# Patient Record
Sex: FEMALE | Race: BLACK OR AFRICAN AMERICAN | Employment: OTHER | URBAN - METROPOLITAN AREA
[De-identification: names, ages, dates, MRNs, and addresses within clinical notes are randomized per-mention and may not be internally consistent; named-entity substitution may affect disease eponyms.]

---

## 2017-01-06 ENCOUNTER — ANESTHESIA EVENT (OUTPATIENT)
Dept: SURGERY | Age: 72
End: 2017-01-06

## 2017-01-09 ENCOUNTER — ANESTHESIA (OUTPATIENT)
Dept: SURGERY | Age: 72
End: 2017-01-09

## 2017-01-09 ENCOUNTER — SURGERY (OUTPATIENT)
Age: 72
End: 2017-01-09

## 2017-01-09 ENCOUNTER — APPOINTMENT (OUTPATIENT)
Dept: GENERAL RADIOLOGY | Age: 72
End: 2017-01-09
Attending: UROLOGY
Payer: MEDICARE

## 2017-01-19 RX ORDER — POTASSIUM CHLORIDE 40 MEQ/15ML
60 SOLUTION ORAL DAILY
COMMUNITY
End: 2017-06-23 | Stop reason: SDUPTHER

## 2017-01-20 ENCOUNTER — ANESTHESIA EVENT (OUTPATIENT)
Dept: SURGERY | Age: 72
End: 2017-01-20
Payer: MEDICARE

## 2017-01-23 ENCOUNTER — ANESTHESIA (OUTPATIENT)
Dept: SURGERY | Age: 72
End: 2017-01-23
Payer: MEDICARE

## 2017-01-23 ENCOUNTER — APPOINTMENT (OUTPATIENT)
Dept: GENERAL RADIOLOGY | Age: 72
End: 2017-01-23
Attending: UROLOGY
Payer: MEDICARE

## 2017-01-23 ENCOUNTER — HOSPITAL ENCOUNTER (OUTPATIENT)
Age: 72
Setting detail: OUTPATIENT SURGERY
Discharge: HOME | End: 2017-01-23
Attending: UROLOGY | Admitting: UROLOGY
Payer: MEDICARE

## 2017-01-23 VITALS
SYSTOLIC BLOOD PRESSURE: 127 MMHG | WEIGHT: 124 LBS | DIASTOLIC BLOOD PRESSURE: 85 MMHG | BODY MASS INDEX: 23.41 KG/M2 | HEART RATE: 100 BPM | HEIGHT: 61 IN | TEMPERATURE: 97.6 F | RESPIRATION RATE: 18 BRPM | OXYGEN SATURATION: 95 %

## 2017-01-23 DIAGNOSIS — N32.89 BLADDER MASS: Primary | ICD-10-CM

## 2017-01-23 LAB
BUN BLD-MCNC: 23 MG/DL (ref 7–18)
CHLORIDE BLD-SCNC: 100 MMOL/L (ref 100–108)
GLUCOSE BLD STRIP.AUTO-MCNC: 129 MG/DL (ref 74–106)
HCT VFR BLD CALC: 44 % (ref 36–49)
HGB BLD-MCNC: 15 G/DL (ref 12–16)
POTASSIUM BLD-SCNC: 3.7 MMOL/L (ref 3.5–5.5)
SODIUM BLD-SCNC: 140 MMOL/L (ref 136–145)

## 2017-01-23 PROCEDURE — 74011250636 HC RX REV CODE- 250/636

## 2017-01-23 PROCEDURE — 77030013079 HC BLNKT BAIR HGGR 3M -A: Performed by: ANESTHESIOLOGY

## 2017-01-23 PROCEDURE — 77030018846 HC SOL IRR STRL H20 ICUM -A: Performed by: UROLOGY

## 2017-01-23 PROCEDURE — 74011000250 HC RX REV CODE- 250

## 2017-01-23 PROCEDURE — 77030011640 HC PAD GRND REM COVD -A: Performed by: UROLOGY

## 2017-01-23 PROCEDURE — C1758 CATHETER, URETERAL: HCPCS | Performed by: UROLOGY

## 2017-01-23 PROCEDURE — 74011000272 HC RX REV CODE- 272: Performed by: UROLOGY

## 2017-01-23 PROCEDURE — 76010000149 HC OR TIME 1 TO 1.5 HR: Performed by: UROLOGY

## 2017-01-23 PROCEDURE — 77030028098 HC ELECTRD LP BPLR OCOA -F: Performed by: UROLOGY

## 2017-01-23 PROCEDURE — 77030005520 HC CATH URETH FOL38 BARD -A: Performed by: UROLOGY

## 2017-01-23 PROCEDURE — 77030008477 HC STYL SATN SLP COVD -A: Performed by: ANESTHESIOLOGY

## 2017-01-23 PROCEDURE — 76210000026 HC REC RM PH II 1 TO 1.5 HR: Performed by: UROLOGY

## 2017-01-23 PROCEDURE — 76060000033 HC ANESTHESIA 1 TO 1.5 HR: Performed by: UROLOGY

## 2017-01-23 PROCEDURE — 82947 ASSAY GLUCOSE BLOOD QUANT: CPT

## 2017-01-23 PROCEDURE — 77030012961 HC IRR KT CYSTO/TUR ICUM -A: Performed by: UROLOGY

## 2017-01-23 PROCEDURE — 76210000016 HC OR PH I REC 1 TO 1.5 HR: Performed by: UROLOGY

## 2017-01-23 PROCEDURE — 74011250637 HC RX REV CODE- 250/637: Performed by: NURSE ANESTHETIST, CERTIFIED REGISTERED

## 2017-01-23 PROCEDURE — 77030008683 HC TU ET CUF COVD -A: Performed by: ANESTHESIOLOGY

## 2017-01-23 PROCEDURE — 74011250636 HC RX REV CODE- 250/636: Performed by: NURSE ANESTHETIST, CERTIFIED REGISTERED

## 2017-01-23 PROCEDURE — 77030032490 HC SLV COMPR SCD KNE COVD -B: Performed by: UROLOGY

## 2017-01-23 PROCEDURE — 74011250636 HC RX REV CODE- 250/636: Performed by: UROLOGY

## 2017-01-23 PROCEDURE — 77030010545: Performed by: UROLOGY

## 2017-01-23 PROCEDURE — 88307 TISSUE EXAM BY PATHOLOGIST: CPT | Performed by: UROLOGY

## 2017-01-23 PROCEDURE — 74011636320 HC RX REV CODE- 636/320: Performed by: UROLOGY

## 2017-01-23 PROCEDURE — 77030011267 HC ELECTRD BLD COVD -A: Performed by: UROLOGY

## 2017-01-23 PROCEDURE — 77030010547 HC BG URIN W/UMETER -A: Performed by: UROLOGY

## 2017-01-23 PROCEDURE — 77030012884 HC SLV COMPR SCD MTEK -B: Performed by: UROLOGY

## 2017-01-23 PROCEDURE — 77030021163 HC TUBE CYSTO IRR ICUM -A: Performed by: UROLOGY

## 2017-01-23 PROCEDURE — 77030018831 HC SOL IRR H20 BAXT -A: Performed by: UROLOGY

## 2017-01-23 RX ORDER — FENTANYL CITRATE 50 UG/ML
25 INJECTION, SOLUTION INTRAMUSCULAR; INTRAVENOUS AS NEEDED
Status: DISCONTINUED | OUTPATIENT
Start: 2017-01-23 | End: 2017-01-23 | Stop reason: HOSPADM

## 2017-01-23 RX ORDER — FAMOTIDINE 20 MG/1
20 TABLET, FILM COATED ORAL ONCE
Status: COMPLETED | OUTPATIENT
Start: 2017-01-24 | End: 2017-01-23

## 2017-01-23 RX ORDER — ONDANSETRON 2 MG/ML
4 INJECTION INTRAMUSCULAR; INTRAVENOUS ONCE
Status: DISCONTINUED | OUTPATIENT
Start: 2017-01-23 | End: 2017-01-23 | Stop reason: HOSPADM

## 2017-01-23 RX ORDER — SODIUM CHLORIDE, SODIUM LACTATE, POTASSIUM CHLORIDE, CALCIUM CHLORIDE 600; 310; 30; 20 MG/100ML; MG/100ML; MG/100ML; MG/100ML
25 INJECTION, SOLUTION INTRAVENOUS CONTINUOUS
Status: DISCONTINUED | OUTPATIENT
Start: 2017-01-23 | End: 2017-01-23 | Stop reason: HOSPADM

## 2017-01-23 RX ORDER — DOCUSATE SODIUM 100 MG/1
100 CAPSULE, LIQUID FILLED ORAL 2 TIMES DAILY
Qty: 30 CAP | Refills: 0 | Status: SHIPPED | OUTPATIENT
Start: 2017-01-23 | End: 2017-04-23

## 2017-01-23 RX ORDER — ONDANSETRON 2 MG/ML
INJECTION INTRAMUSCULAR; INTRAVENOUS AS NEEDED
Status: DISCONTINUED | OUTPATIENT
Start: 2017-01-23 | End: 2017-01-23 | Stop reason: HOSPADM

## 2017-01-23 RX ORDER — FAMOTIDINE 20 MG/1
TABLET, FILM COATED ORAL
Status: DISCONTINUED
Start: 2017-01-23 | End: 2017-01-23 | Stop reason: HOSPADM

## 2017-01-23 RX ORDER — SODIUM CHLORIDE, SODIUM LACTATE, POTASSIUM CHLORIDE, CALCIUM CHLORIDE 600; 310; 30; 20 MG/100ML; MG/100ML; MG/100ML; MG/100ML
50 INJECTION, SOLUTION INTRAVENOUS CONTINUOUS
Status: DISCONTINUED | OUTPATIENT
Start: 2017-01-24 | End: 2017-01-23 | Stop reason: HOSPADM

## 2017-01-23 RX ORDER — CEFAZOLIN SODIUM 2 G/50ML
2 SOLUTION INTRAVENOUS
Status: COMPLETED | OUTPATIENT
Start: 2017-01-23 | End: 2017-01-23

## 2017-01-23 RX ORDER — OXYCODONE AND ACETAMINOPHEN 5; 325 MG/1; MG/1
1 TABLET ORAL
Qty: 10 TAB | Refills: 0 | Status: SHIPPED | OUTPATIENT
Start: 2017-01-23 | End: 2017-06-23

## 2017-01-23 RX ORDER — GENTAMICIN SULFATE 40 MG/ML
INJECTION, SOLUTION INTRAMUSCULAR; INTRAVENOUS AS NEEDED
Status: DISCONTINUED | OUTPATIENT
Start: 2017-01-23 | End: 2017-01-23 | Stop reason: HOSPADM

## 2017-01-23 RX ORDER — FENTANYL CITRATE 50 UG/ML
INJECTION, SOLUTION INTRAMUSCULAR; INTRAVENOUS AS NEEDED
Status: DISCONTINUED | OUTPATIENT
Start: 2017-01-23 | End: 2017-01-23 | Stop reason: HOSPADM

## 2017-01-23 RX ORDER — PROPOFOL 10 MG/ML
INJECTION, EMULSION INTRAVENOUS AS NEEDED
Status: DISCONTINUED | OUTPATIENT
Start: 2017-01-23 | End: 2017-01-23 | Stop reason: HOSPADM

## 2017-01-23 RX ORDER — CEFAZOLIN SODIUM 2 G/50ML
SOLUTION INTRAVENOUS
Status: DISCONTINUED
Start: 2017-01-23 | End: 2017-01-23 | Stop reason: HOSPADM

## 2017-01-23 RX ORDER — PHENAZOPYRIDINE HYDROCHLORIDE 200 MG/1
200 TABLET, FILM COATED ORAL
Qty: 6 TAB | Refills: 0 | Status: SHIPPED | OUTPATIENT
Start: 2017-01-23 | End: 2017-01-25

## 2017-01-23 RX ORDER — LIDOCAINE HYDROCHLORIDE 20 MG/ML
INJECTION, SOLUTION EPIDURAL; INFILTRATION; INTRACAUDAL; PERINEURAL AS NEEDED
Status: DISCONTINUED | OUTPATIENT
Start: 2017-01-23 | End: 2017-01-23 | Stop reason: HOSPADM

## 2017-01-23 RX ORDER — DEXAMETHASONE SODIUM PHOSPHATE 4 MG/ML
INJECTION, SOLUTION INTRA-ARTICULAR; INTRALESIONAL; INTRAMUSCULAR; INTRAVENOUS; SOFT TISSUE AS NEEDED
Status: DISCONTINUED | OUTPATIENT
Start: 2017-01-23 | End: 2017-01-23 | Stop reason: HOSPADM

## 2017-01-23 RX ORDER — ROCURONIUM BROMIDE 10 MG/ML
INJECTION, SOLUTION INTRAVENOUS AS NEEDED
Status: DISCONTINUED | OUTPATIENT
Start: 2017-01-23 | End: 2017-01-23 | Stop reason: HOSPADM

## 2017-01-23 RX ADMIN — FAMOTIDINE 20 MG: 20 TABLET ORAL at 06:34

## 2017-01-23 RX ADMIN — FENTANYL CITRATE 50 MCG: 50 INJECTION, SOLUTION INTRAMUSCULAR; INTRAVENOUS at 07:55

## 2017-01-23 RX ADMIN — ONDANSETRON 4 MG: 2 INJECTION INTRAMUSCULAR; INTRAVENOUS at 08:26

## 2017-01-23 RX ADMIN — LIDOCAINE HYDROCHLORIDE 50 MG: 20 INJECTION, SOLUTION EPIDURAL; INFILTRATION; INTRACAUDAL; PERINEURAL at 07:55

## 2017-01-23 RX ADMIN — ROCURONIUM BROMIDE 25 MG: 10 INJECTION, SOLUTION INTRAVENOUS at 08:00

## 2017-01-23 RX ADMIN — PROPOFOL 120 MG: 10 INJECTION, EMULSION INTRAVENOUS at 07:55

## 2017-01-23 RX ADMIN — CEFAZOLIN SODIUM 2 G: 2 SOLUTION INTRAVENOUS at 07:55

## 2017-01-23 RX ADMIN — PROPOFOL 30 MG: 10 INJECTION, EMULSION INTRAVENOUS at 08:08

## 2017-01-23 RX ADMIN — ROCURONIUM BROMIDE 5 MG: 10 INJECTION, SOLUTION INTRAVENOUS at 07:55

## 2017-01-23 RX ADMIN — SODIUM CHLORIDE, SODIUM LACTATE, POTASSIUM CHLORIDE, AND CALCIUM CHLORIDE 50 ML/HR: 600; 310; 30; 20 INJECTION, SOLUTION INTRAVENOUS at 06:45

## 2017-01-23 RX ADMIN — LIDOCAINE HYDROCHLORIDE 50 MG: 20 INJECTION, SOLUTION EPIDURAL; INFILTRATION; INTRACAUDAL; PERINEURAL at 08:08

## 2017-01-23 RX ADMIN — DEXAMETHASONE SODIUM PHOSPHATE 4 MG: 4 INJECTION, SOLUTION INTRA-ARTICULAR; INTRALESIONAL; INTRAMUSCULAR; INTRAVENOUS; SOFT TISSUE at 08:21

## 2017-01-23 NOTE — OP NOTES
Martinez Sanchez    Name:  Bart Barajas  MR#:  [id number]  :  1945  Account #:  [de-identified]  Date of Adm:  2017  Date of Surgery:  2017      PREOPERATIVE DIAGNOSIS: Bladder tumor. POSTOPERATIVE DIAGNOSIS: Bladder tumor. PROCEDURES PERFORMED  1. Cystoscopy. 2. Transurethral resection of bladder tumor, small. 3. Bilateral retrograde pyelogram with intraoperative interpretation. 4. Mitomycin-C instillation. SURGEON: Demarco Becerra MD    ASSISTANT: Lorraine Sorensen MD    ANESTHESIA: General.    FLUIDS: Crystalloid. ESTIMATED BLOOD LOSS: Minimal.    SPECIMENS REMOVED: Bladder tumor chips. DRAINS: A 16-Greek Howe catheter. INTRAOPERATIVE FINDINGS  1. On exam under anesthesia, the patient's bladder was freely mobile. There were no 3-D masses noted. 2. On right retrograde pyelogram with intraoperative interpretation, the  patient had no hydronephrosis or filling defects. 3. On left retrograde pyelogram with intraoperative interpretation, the  patient had no hydronephrosis or filling defects. 4. On cystourethroscopy, the patient had an approximately 1 cm tumor  on the left lateral wall. This was completely resected down to muscle. No other lesions were noted. INDICATIONS: The patient is an extremely pleasant 66-year-old  female who was noted on CT scan to have a bladder mass. She is  currently undergoing therapy for lung cancer. Cystoscopy in the office  revealed bladder tumor. All risks, benefits, and alternatives were  explained, and the patient decided to proceed. DESCRIPTION OF PROCEDURE: The patient was first identified in  the holding area and taken back to the operating room. Perioperative  antibiotics were given and sequential compression devices placed. Anesthesia was induced. The patient was then placed in dorsal  lithotomy position, taking care to pad all pressure points.  The patient  was prepped and draped in the usual sterile fashion. Time-out was  performed. First, a 22-Rwandan rigid cystoscope was inserted into the patient's  bladder. Systematic cystoscopy with 30 and 70-degree lenses was  performed with findings as noted above. Once this was done, an 8-  [place] cone tip catheter was used to perform a left retrograde  pyelogram, noting no hydronephrosis or filling defects. We then  performed a right retrograde pyelogram with intraoperative  interpretation using the 8-Rwandan cone tip catheter with no  hydronephrosis or filling defects. Both collecting systems drained well. Once this was done, we replaced the cystoscope with a 25-Rwandan  continuous flow resectoscope. The bladder tumor was completely  resected with a super loop. Tips were collected. The bladder was filled  and emptied multiple times. Hemostasis was assured. The scope was  removed. A 16-Rwandan Howe catheter placed. Mitomycin instilled. The  patient was awakened from anesthesia and taken back to the PACU in  stable condition. Of note, I was scrubbed and present for all critical  portions of the case.         MD ELSIE Monsivais / Bill Calderon  D:  01/23/2017   08:37  T:  01/23/2017   09:14  Job #:  [id number]

## 2017-01-23 NOTE — PROGRESS NOTES
conducted a pre-surgery visit with Ammon Bryant, who is a 66 y.o.,female. The  provided the following Interventions:  Initiated a relationship of care and support. Offered prayer and assurance of continued prayers on patient's behalf. Plan:  Chaplains will continue to follow and will provide pastoral care on an as needed/requested basis.  recommends bedside caregivers page  on duty if patient shows signs of acute spiritual or emotional distress.     Rivka Mejias   Spiritual Care   [phone]

## 2017-01-23 NOTE — IP AVS SNAPSHOT
Liz Armijo 
 
 
 [address] 
[phone] Patient: Michelle Olsen MRN: [id number] EQS:[id number] You are allergic to the following No active allergies Recent Documentation Height Weight BMI OB Status Smoking Status 1.549 m 56.2 kg 23.43 kg/m2 Hysterectomy Former Smoker Emergency Contacts Name Discharge Info Relation Home Work Mobile [facility] DISCHARGE CAREGIVER [3] Child [2] [phone] [place]  Child [2] [phone] About your hospitalization You were admitted on:  January 23, 2017 [facility] on:  January 23, 2017 Unit phone number:  [phone] Why you were hospitalized Your primary diagnosis was:  Not on File Providers Seen During Your Hospitalizations Provider Role Specialty Primary office phone Paul Cook MD Attending Provider Urology [phone] Your Primary Care Physician (PCP) Primary Care Physician Office Phone Office Fax [address] Follow-up Information Follow up With Details Comments Contact Info Dagmar Stanford MD   [address] 
[phone] Your Appointments Tuesday February 07, 2017  8:30 AM EST  
ESTABLISHED PATIENT with Paul Cook MD  
[facility] [place] [address]  
[phone] Current Discharge Medication List  
  
START taking these medications Dose & Instructions Dispensing Information Comments Morning Noon Evening Bedtime  
 docusate sodium 100 mg capsule Commonly known as:  Jarrett Ulloa Your next dose is: Today, Tomorrow Other:  _________ Dose:  100 mg Take 1 Cap by mouth two (2) times a day for 90 days. Quantity:  30 Cap Refills:  0  
     
   
   
   
  
 oxyCODONE-acetaminophen 5-325 mg per tablet Commonly known as:  PERCOCET Your next dose is: Today, Tomorrow Other:  _________ Dose:  1 Tab Take 1 Tab by mouth every four (4) hours as needed for Pain. Max Daily Amount: 6 Tabs. Quantity:  10 Tab Refills:  0 phenazopyridine 200 mg tablet Commonly known as:  PYRIDIUM Your next dose is: Today, Tomorrow Other:  _________ Dose:  200 mg Take 1 Tab by mouth three (3) times daily as needed for Pain (or dysuria) for up to 2 days. Quantity:  6 Tab Refills:  0 CONTINUE these medications which have NOT CHANGED Dose & Instructions Dispensing Information Comments Morning Noon Evening Bedtime  
 amLODIPine 5 mg tablet Commonly known as:  Jenkins Mend Your next dose is: Today, Tomorrow Other:  _________ Dose:  5 mg Take 5 mg by mouth daily. Refills:  0  
     
   
   
   
  
 CALCIUM 600 PO Your next dose is: Today, Tomorrow Other:  _________ Take  by mouth. Refills:  0 DULERA 100-5 mcg/actuation HFA inhaler Generic drug:  mometasone-formoterol Your next dose is: Today, Tomorrow Other:  _________ Dose:  1 Puff Take 1 Puff by inhalation daily. Refills:  0  
     
   
   
   
  
 hydroCHLOROthiazide 25 mg tablet Commonly known as:  HYDRODIURIL Your next dose is: Today, Tomorrow Other:  _________ Dose:  25 mg Take 25 mg by mouth daily. Refills:  0  
     
   
   
   
  
 ibandronate 150 mg tablet Commonly known as:  Rigoberto Blandon Your next dose is: Today, Tomorrow Other:  _________ Dose:  150 mg Take 150 mg by mouth every thirty (30) days. Refills:  0  
     
   
   
   
  
 meloxicam 15 mg tablet Commonly known as:  MOBIC  
   
 Your next dose is: Today, Tomorrow Other:  _________ Dose:  15 mg Take 15 mg by mouth daily. Refills:  0  
     
   
   
   
  
 potassium chloride 40 mEq/15 mL Liqd Commonly known as:  KAON 20% Your next dose is: Today, Tomorrow Other:  _________ Dose:  60 mEq Take 60 mEq by mouth daily. Indications: HYPOKALEMIA Refills:  0  
     
   
   
   
  
 VITAMIN D3 1,000 unit tablet Generic drug:  cholecalciferol Your next dose is: Today, Tomorrow Other:  _________ Take  by mouth daily. Refills:  0 Where to Get Your Medications Information on where to get these meds will be given to you by the nurse or doctor. ! Ask your nurse or doctor about these medications  
  docusate sodium 100 mg capsule  
 oxyCODONE-acetaminophen 5-325 mg per tablet  
 phenazopyridine 200 mg tablet Discharge Instructions Cystoscopy: What to Expect at [facility] Your Recovery A cystoscopy is a procedure that lets a doctor look inside of the bladder and the urethra. The urethra is the tube that carries urine from the bladder to outside the body. The doctor uses a thin, lighted tube called a cystoscope to look for kidney or bladder stones, tumors, bleeding, or infection. After the cystoscopy, your urethra may be sore at first, and it may burn when you urinate for the first few days after the procedure. You may feel the need to urinate more often, and your urine may be pink. These symptoms should get better in 1 or 2 days. You will probably be able to go back to work or most of your usual activities in 1 or 2 days. This care sheet gives you a general idea about how long it will take for you to recover. But each person recovers at a different pace. Follow the steps below to get better as quickly as possible. How can you care for yourself at home? Activity · Rest when you feel tired. Getting enough sleep will help you recover. · Try to walk each day. Start by walking a little more than you did the day before. Bit by bit, increase the amount you walk. Walking boosts blood flow and helps prevent pneumonia and constipation. · Avoid strenuous activities, such as bicycle riding, jogging, weight lifting, or aerobic exercise, until your doctor says it is okay. · Ask your doctor when you can drive again. · Most people are able to return to work within 1 or 2 days after the procedure. · You may shower and take baths as usual. 
· Ask your doctor when it is okay for you to have sex. Diet · You can eat your normal diet. If your stomach is upset, try bland, low-fat foods like plain rice, broiled chicken, toast, and yogurt. · Drink plenty of fluids (unless your doctor tells you not to). Medicines · Take pain medicines exactly as directed. ¨ If the doctor gave you a prescription medicine for pain, take it as prescribed. ¨ If you are not taking a prescription pain medicine, ask your doctor if you can take an over-the-counter medicine. · If you think your pain medicine is making you sick to your stomach: 
¨ Take your medicine after meals (unless your doctor has told you not to). ¨ Ask your doctor for a different pain medicine. · If your doctor prescribed antibiotics, take them as directed. Do not stop taking them just because you feel better. You need to take the full course of antibiotics. Follow-up care is a key part of your treatment and safety. Be sure to make and go to all appointments, and call your doctor if you are having problems. It's also a good idea to know your test results and keep a list of the medicines you take. When should you call for help? Call 911 anytime you think you may need emergency care. For example, call if: 
· You passed out (lost consciousness). · You have severe trouble breathing. · You have sudden chest pain and shortness of breath, or you cough up blood. · You have severe belly pain. Call your doctor now or seek immediate medical care if: 
· You are sick to your stomach or cannot keep fluids down. · Your urine is still red or you see blood clots after you have urinated several times. · You have trouble passing urine or stool, especially if you have pain or swelling in your lower belly. · You have signs of a blood clot, such as: 
¨ Pain in your calf, back of the knee, thigh, or groin. ¨ Redness and swelling in your leg or groin. · You develop a fever or severe chills. · You have pain in your back just below your rib cage. This is called flank pain. Watch closely for changes in your health, and be sure to contact your doctor if: 
· You have pain or burning when you urinate. A burning feeling is normal for a day or two after the test, but call if it does not get better. · You have a frequent urge to urinate but can pass only small amounts of urine. · Your urine is pink, red, or cloudy, or smells bad. It is normal for the urine to have a pinkish color for a few days after the test, but call if it does not get better. Where can you learn more? Go to http://gabby-hiro.info/. Enter E100 in the search box to learn more about \"Cystoscopy: What to Expect at Home. \" Current as of: August 12, 2016 Content Version: 11.1 © 4358-1743 [employer]. Care instructions adapted under license by [employer] (which disclaims liability or warranty for this information). If you have questions about a medical condition or this instruction, always ask your healthcare professional. [place] [address] any warranty or liability for your use of this information. DISCHARGE SUMMARY from Nurse The following personal items are in your possession at time of discharge: 
 
Dental Appliances: With patient Visual Aid: With patient Home Medications: None Clothing: Pants, Shirt, Undergarments, Footwear Other Valuables: Deep Romero PATIENT INSTRUCTIONS: 
 
After general anesthesia or intravenous sedation, for 24 hours or while taking prescription Narcotics: · Limit your activities · Do not drive and operate hazardous machinery · Do not make important personal or business decisions · Do  not drink alcoholic beverages · If you have not urinated within 8 hours after discharge, please contact your surgeon on call. Report the following to your surgeon: 
· Excessive pain, swelling, redness or odor of or around the surgical area · Temperature over 100.5 · Nausea and vomiting lasting longer than 4 hours or if unable to take medications · Any signs of decreased circulation or nerve impairment to extremity: change in color, persistent  numbness, tingling, coldness or increase pain · Any questions What to do at Home: 
Recommended activity: Activity as tolerated and no driving for today These are general instructions for a healthy lifestyle: No smoking/ No tobacco products/ Avoid exposure to second hand smoke Surgeon General's Warning:  Quitting smoking now greatly reduces serious risk to your health. Obesity, smoking, and sedentary lifestyle greatly increases your risk for illness A healthy diet, regular physical exercise & weight monitoring are important for maintaining a healthy lifestyle You may be retaining fluid if you have a history of heart failure or if you experience any of the following symptoms:  Weight gain of 3 pounds or more overnight or 5 pounds in a week, increased swelling in our hands or feet or shortness of breath while lying flat in bed. Please call your doctor as soon as you notice any of these symptoms; do not wait until your next office visit. Recognize signs and symptoms of STROKE: 
 
F-face looks uneven A-arms unable to move or move unevenly S-speech slurred or non-existent T-time-call 911 as soon as signs and symptoms begin-DO NOT go Back to bed or wait to see if you get better-TIME IS BRAIN. Warning Signs of HEART ATTACK Call 911 if you have these symptoms: 
? Chest discomfort. Most heart attacks involve discomfort in the center of the chest that lasts more than a few minutes, or that goes away and comes back. It can feel like uncomfortable pressure, squeezing, fullness, or pain. ? Discomfort in other areas of the upper body. Symptoms can include pain or discomfort in one or both arms, the back, neck, jaw, or stomach. ? Shortness of breath with or without chest discomfort. ? Other signs may include breaking out in a cold sweat, nausea, or lightheadedness. Don't wait more than five minutes to call [address]! Fast action can save your life. Calling 911 is almost always the fastest way to get lifesaving treatment. Emergency Medical Services staff can begin treatment when they arrive  up to an hour sooner than if someone gets to the hospital by car. The discharge information has been reviewed with the patient. The patient verbalized understanding. Discharge medications reviewed with the patient and appropriate educational materials and side effects teaching were provided. Patient armband removed and given to patient to take home. Patient was informed of the privacy risks if armband lost or stolen Discharge Orders Procedure Order Date Status Priority Quantity Spec Type Associated Dx CALL YOUR DOCTOR For: Other Temp > 101.4, inability to void after trying for 2 hours, or large clots (larger than a nickel) in your urine. Light pink or red urine is normal. 01/23/17 0840 Normal Routine 1  Bladder mass [[id number]] Comments:  Temp > 101.4, inability to void after trying for 2 hours, or large clots (larger than a nickel) in your urine. Light pink or red urine is normal.  
  Questions: For:  Other General Information Please provide this summary of care documentation to your next provider. Patient Signature:  ____________________________________________________________ Date:  ____________________________________________________________  
  
Sebastian Naas Provider Signature:  ____________________________________________________________ Date:  ____________________________________________________________

## 2017-01-23 NOTE — BRIEF OP NOTE
BRIEF OPERATIVE NOTE    Date of Procedure: 1/23/2017   Preoperative Diagnosis: bladder mass n32.89,urine leukocytes r82.99,microscopic hematuria   r31.29  Postoperative Diagnosis: bladder mass  Procedure(s):  cystoscopy,TRANSURETHRAL RESECTION OF BLADDER TUMOR,mitomycin c  Surgeon(s) and Role:     * Salinas Benitez MD - Primary            Surgical Staff:  Circ-1: Lisa Spear RN  Scrub Tech-1: Darlyn Jo  Event Time In   Incision Start  8:09 AM   Incision Close      Anesthesia: General   Estimated Blood Loss: <10  Specimens:   ID Type Source Tests Collected by Time Destination   1 : BLADDER TUMOR Preservative   Salinas Benitez MD 1/23/2017  8:22 AM Pathology      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

## 2017-01-23 NOTE — ANESTHESIA PREPROCEDURE EVALUATION
Anesthetic History   No history of anesthetic complications            Review of Systems / Medical History  Patient summary reviewed and pertinent labs reviewed    Pulmonary    COPD: mild               Neuro/Psych   Within defined limits           Cardiovascular    Hypertension: well controlled                   GI/Hepatic/Renal  Within defined limits              Endo/Other      Hypothyroidism: well controlled  Obesity and arthritis     Other Findings   Comments: Current Smoker? NO       Elective Surgery? Yes       Abstained from smoking 24 hours prior to anesthesia? N/A    Risk Factors for Postoperative nausea/vomiting:       History of postoperative nausea/vomiting? NO       Female? YES       Motion sickness? NO       Intended opioid administration for postoperative analgesia? YES           Physical Exam    Airway  Mallampati: II  TM Distance: 4 - 6 cm  Neck ROM: normal range of motion   Mouth opening: Normal     Cardiovascular  Regular rate and rhythm,  S1 and S2 normal,  no murmur, click, rub, or gallop             Dental    Dentition: Lower partial plate, Full upper dentures and Poor dentition  Comments: Loose lower incisors.      Pulmonary                 Abdominal  Abdominal exam normal       Other Findings            Anesthetic Plan    ASA: 3  Anesthesia type: general          Induction: Intravenous  Anesthetic plan and risks discussed with: Patient

## 2017-01-23 NOTE — H&P
History and physical review. The patient has been examined. There have been no significant clinical changes.     NAD, CTAB, RRR     NA Side marked  Ancef On call to OR  Consented  Plan to proceed with TURBT            ASSESSMENT:         ICD-10-CM ICD-9-CM      1. Bladder mass N32.89 596.89 potassium chloride (K-DUR, KLOR-CON) 20 mEq tablet            CALCIUM CARBONATE (CALCIUM 600 PO)            cholecalciferol (VITAMIN D3) 1,000 unit tablet            CYTOLOGY NON GYN, UROLOGY [facility]            CYSTOURETHROSCOPY            URINE C&S            AMB POC URINALYSIS DIP STICK AUTO W/O MICRO   2. Urine leukocytes R82.99 791.7 potassium chloride (K-DUR, KLOR-CON) 20 mEq tablet            CALCIUM CARBONATE (CALCIUM 600 PO)            cholecalciferol (VITAMIN D3) 1,000 unit tablet            CYTOLOGY NON GYN, UROLOGY [facility]            CYSTOURETHROSCOPY            URINE C&S            AMB POC URINALYSIS DIP STICK AUTO W/O MICRO   3. Microscopic hematuria R31.29 599.72 potassium chloride (K-DUR, KLOR-CON) 20 mEq tablet            CALCIUM CARBONATE (CALCIUM 600 PO)            cholecalciferol (VITAMIN D3) 1,000 unit tablet            CYTOLOGY NON GYN, UROLOGY [facility]            CYSTOURETHROSCOPY            URINE C&S            AMB POC URINALYSIS DIP STICK AUTO W/O MICRO       71yoF with hx of lung carcinoma s/p stereotatic XRT x 2 ('14'16) noted on follow up imaging to have a bladder mass       PLAN:    Urine culture and cytology today. Cysto today, see separate note. Plan to take pt to the OR for Cysto, TURBT, [facility]. Surgery letter sent. Will obtain medical clearance prior to OR           The risks of TURBT were discussed including but not limited to pain, bleeding, infection, heart attack, stroke, blood clots in legs that could propogate to the lungs resulting in pulmonary embolus, and ultimately death.  Also discussed risk of bladder injury, perforation, need for catheterization, possible hospitalization or secondary operative procedure. We discussed use of mitomycin and risk of increased bladder irritation. All questions answered to patient's apparent statisfaction.                          Chief Complaint   Patient presents with    Referral / Consult         bladder mass           HISTORY OF PRESENT ILLNESS: Ary Rivera is a 66 y.o. female who is seen in consultation as referred by Dr. Caren Bustamante. Gary Mcnulty MD for urinary bladder mass. Pt presents due to an incidental finding of a 1 cm urinary bladder mass.      Pt's baseline urinary symptoms include intermittent dysuria and recurrent UTI's. She has no current symptoms of UTI. No new bothersome urinary symptoms to report today. Denies gross hematuria, urgency, frequency, and incontinence. No f/c/n/v.       Pt states her medical history includes lung cancer diagnosed in 2014 and treated w/ radiation. Questionable recurrence treated w/ re-radiation 2016. Monitored via regular CT-scans w/ no evidence of recurrence. H/o squamous cell skin cancer. Additionally, she has a h/o restrictive lung disease.       Patient was undergoing regular CT scans to monitor he lung cancer when this new bladder mass was found 11/16/16. No hematuria. No previous bladder cancer. Longtime smoking hx. No exposure to industrial chemical. No recent weight loss.                 Past Medical History   Diagnosis Date    Arthritis            Knees and left shoulder    Cancer ([address] 1978         squamous cell CA skin    High cholesterol       History of echocardiogram 02/17/2014         EF 55-60%. No RWMA. Mild conc LVH (appears thicker in apex). Gr 1 DDfx. RVSP 25 mmHg.  History of myocardial perfusion scan 02/17/2014         No ischemia or prior infarction. EF 71%.  Nondiagnostic EKG on pharm stress test.    Hypertension       Lung cancer ([address] 4/9/2014    Pulmonary nodule 1/4/14    Radiation       Thyroid disease 2001                       Past Surgical History   Procedure Laterality Date    Hx gyn               hysterectomy    Hx other surgical               skin cancer removed.  Hx premalig/benign skin lesion excision    1974         benign    Hx premalig/benign skin lesion excision    1996         benign left x 2    Hx appendectomy          Hx colonoscopy    2002    Hx cyst removal Left            Breast    Hx tubal ligation          Pr bronchoscopy,transbronch biopsy    1/20/2014                                    Social History   Substance Use Topics    Smoking status: Former Smoker         Packs/day: 0.50         Years: 40.00         Quit date: 2/7/2010    Smokeless tobacco: Never Used    Alcohol use 12.0 oz/week           24 Cans of beer per week              Comment: per week           No Known Allergies                Family History   Problem Relation Age of Onset    Heart Attack Sister                          Current Outpatient Prescriptions   Medication Sig Dispense Refill    potassium chloride (K-DUR, KLOR-CON) 20 mEq tablet TK 1 T PO QD    1    CALCIUM CARBONATE (CALCIUM 600 PO) Take by mouth.          cholecalciferol (VITAMIN D3) 1,000 unit tablet Take by mouth daily.          meloxicam (MOBIC) 15 mg tablet Take 15 mg by mouth daily.          ibandronate (BONIVA) 150 mg tablet Take 150 mg by mouth every thirty (30) days.          amLODIPine (NORVASC) 5 mg tablet Take 5 mg by mouth daily.          hydrochlorothiazide (HYDRODIURIL) 25 mg tablet Take 25 mg by mouth daily.                  Review of Systems  Constitutional: Fever: No  Skin: Rash: No  HEENT: Hearing difficulty: No  Eyes: Blurred vision: No  Cardiovascular: Chest pain: No  Respiratory: Shortness of breath: No  Gastrointestinal: Nausea/vomiting: No  Musculoskeletal: Back pain: No  Neurological: Weakness: No  Psychological: Memory loss: No  Comments/additional findings:           PHYSICAL EXAMINATION:               Visit Vitals    /78    Ht 5' 1\" (1.549 m)    Wt 129 lb (58.5 kg)    BMI 24.37 kg/m2       Constitutional: Well developed, well-nourished female in no acute distress. CV: No peripheral swelling noted  Respiratory: No respiratory distress or difficulties  Abdomen: Soft and nontender. No masses. No hepatosplenomegaly.  Female: No CVA tenderness. Skin: Normal color. No evidence of jaundice. Neuro/Psych: Patient with appropriate affect. Alert and oriented. Lymphatic: No enlargement of supraclavicular lymph nodes.          REVIEW OF LABS AND IMAGING:                 Results for orders placed or performed in visit on 12/14/16   AMB POC URINALYSIS DIP STICK AUTO W/O MICRO   Result Value Ref Range      Color (UA POC) Yellow         Clarity (UA POC) Cloudy         Glucose (UA POC) Negative Negative      Bilirubin (UA POC) Negative Negative      Ketones (UA POC) Negative Negative      Specific gravity (UA POC) 1.015 1.001 - 1.035      Blood (UA POC) 4+ Negative      pH (UA POC) 6.0 4.6 - 8.0      Protein (UA POC) 2+ Negative mg/dL      Urobilinogen (UA POC) 0.2 mg/dL 0.2 - 1      Nitrites (UA POC) Negative Negative      Leukocyte esterase (UA POC) 4+ Negative       CT A/P 11/16/16  Impression  1. New 1cm mass within the left aspect of the urinary bladder concerning for transitional cell carcinoma  2. No discrete evidence of pulmonary metastases  3. Diverticulosis without diverticulitis       CT w/o 5/18/16      IMPRESSION:        1.  Limited study secondary to the lack of contrast as well as motion and beam hardening artifact.  Small fat-containing left inguinal hernia with subtle protrusion of a loop of small bowel into the neck of the hernia without bowel wall thickening or other inflammatory changes.  However, there is also a small amount of simple fluid more distally within the left angle hernia.  This may correspond to the area of swelling and possible pain described clinically.    2.  Chronic appearing induration along the posterior midline soft tissues at the distal sacrococcygeal level suggesting chronic inflammation. Myrla Bita is associated reactive sclerosis of the coccyx as well as to a lesser extent the most distal portion of the sacrum which may represent remodeling from chronic irritation versus chronic osteomyelitis.  No acute osseous abnormality. 3.  Severe spondylosis including dextroconvex scoliosis as well as levels of listhesis and foraminal/canal encroachment. 4.  Calcified fibroid uterus. 5.  Evidence of prior granulomatous exposure. 6.  Multiple bilateral cystic lesions, left larger than right including several mildly complex cyst with calcified septations in the lower pole the left kidney.  Consider further assessment with followup ultrasound. 7.  Mild anasarca. 8.  Howe catheter in place within a decompressed and thickwalled bladder.  Recommend correlation for cystitis.    9.  Small pleural effusions, left greater than right.                   A copy of today's office visit with all pertinent imaging results and labs were sent to the referring physician.   Quynh Hussein MD

## 2017-01-23 NOTE — DISCHARGE INSTRUCTIONS
Cystoscopy: What to Expect at [address] [facility]    A cystoscopy is a procedure that lets a doctor look inside of the bladder and the urethra. The urethra is the tube that carries urine from the bladder to outside the body. The doctor uses a thin, lighted tube called a cystoscope to look for kidney or bladder stones, tumors, bleeding, or infection. After the cystoscopy, your urethra may be sore at first, and it may burn when you urinate for the first few days after the procedure. You may feel the need to urinate more often, and your urine may be pink. These symptoms should get better in 1 or 2 days. You will probably be able to go back to work or most of your usual activities in 1 or 2 days. This care sheet gives you a general idea about how long it will take for you to recover. But each person recovers at a different pace. Follow the steps below to get better as quickly as possible. How can you care for yourself at home? Activity  · Rest when you feel tired. Getting enough sleep will help you recover. · Try to walk each day. Start by walking a little more than you did the day before. Bit by bit, increase the amount you walk. Walking boosts blood flow and helps prevent pneumonia and constipation. · Avoid strenuous activities, such as bicycle riding, jogging, weight lifting, or aerobic exercise, until your doctor says it is okay. · Ask your doctor when you can drive again. · Most people are able to return to work within 1 or 2 days after the procedure. · You may shower and take baths as usual.  · Ask your doctor when it is okay for you to have sex. Diet  · You can eat your normal diet. If your stomach is upset, try bland, low-fat foods like plain rice, broiled chicken, toast, and yogurt. · Drink plenty of fluids (unless your doctor tells you not to). Medicines  · Take pain medicines exactly as directed. ¨ If the doctor gave you a prescription medicine for pain, take it as prescribed.   ¨ If you are not taking a prescription pain medicine, ask your doctor if you can take an over-the-counter medicine. · If you think your pain medicine is making you sick to your stomach:  ¨ Take your medicine after meals (unless your doctor has told you not to). ¨ Ask your doctor for a different pain medicine. · If your doctor prescribed antibiotics, take them as directed. Do not stop taking them just because you feel better. You need to take the full course of antibiotics. Follow-up care is a key part of your treatment and safety. Be sure to make and go to all appointments, and call your doctor if you are having problems. It's also a good idea to know your test results and keep a list of the medicines you take. When should you call for help? Call 911 anytime you think you may need emergency care. For example, call if:  · You passed out (lost consciousness). · You have severe trouble breathing. · You have sudden chest pain and shortness of breath, or you cough up blood. · You have severe belly pain. Call your doctor now or seek immediate medical care if:  · You are sick to your stomach or cannot keep fluids down. · Your urine is still red or you see blood clots after you have urinated several times. · You have trouble passing urine or stool, especially if you have pain or swelling in your lower belly. · You have signs of a blood clot, such as:  ¨ Pain in your calf, back of the knee, thigh, or groin. ¨ Redness and swelling in your leg or groin. · You develop a fever or severe chills. · You have pain in your back just below your rib cage. This is called flank pain. Watch closely for changes in your health, and be sure to contact your doctor if:  · You have pain or burning when you urinate. A burning feeling is normal for a day or two after the test, but call if it does not get better. · You have a frequent urge to urinate but can pass only small amounts of urine. · Your urine is pink, red, or cloudy, or smells bad. It is normal for the urine to have a pinkish color for a few days after the test, but call if it does not get better. Where can you learn more? Go to http://gabby-hiro.info/. Enter I187 in the search box to learn more about \"Cystoscopy: What to Expect at Home. \"  Current as of: August 12, 2016  Content Version: 11.1  © 0921-4654 [employer]. Care instructions adapted under license by [employer] (which disclaims liability or warranty for this information). If you have questions about a medical condition or this instruction, always ask your healthcare professional. [place] [address] any warranty or liability for your use of this information. DISCHARGE SUMMARY from Nurse    The following personal items are in your possession at time of discharge:    Dental Appliances: With patient  Visual Aid: With patient     Home Medications: None     Clothing: Pants, Shirt, Undergarments, Footwear  Other Valuables: Eyeglasses             PATIENT INSTRUCTIONS:    After general anesthesia or intravenous sedation, for 24 hours or while taking prescription Narcotics:  · Limit your activities  · Do not drive and operate hazardous machinery  · Do not make important personal or business decisions  · Do  not drink alcoholic beverages  · If you have not urinated within 8 hours after discharge, please contact your surgeon on call.     Report the following to your surgeon:  · Excessive pain, swelling, redness or odor of or around the surgical area  · Temperature over 100.5  · Nausea and vomiting lasting longer than 4 hours or if unable to take medications  · Any signs of decreased circulation or nerve impairment to extremity: change in color, persistent  numbness, tingling, coldness or increase pain  · Any questions        What to do at Home:  Recommended activity: Activity as tolerated and no driving for today   These are general instructions for a healthy lifestyle:    No smoking/ No tobacco products/ Avoid exposure to second hand smoke    Surgeon General's Warning:  Quitting smoking now greatly reduces serious risk to your health. Obesity, smoking, and sedentary lifestyle greatly increases your risk for illness    A healthy diet, regular physical exercise & weight monitoring are important for maintaining a healthy lifestyle    You may be retaining fluid if you have a history of heart failure or if you experience any of the following symptoms:  Weight gain of 3 pounds or more overnight or 5 pounds in a week, increased swelling in our hands or feet or shortness of breath while lying flat in bed. Please call your doctor as soon as you notice any of these symptoms; do not wait until your next office visit. Recognize signs and symptoms of STROKE:    F-face looks uneven    A-arms unable to move or move unevenly    S-speech slurred or non-existent    T-time-call 911 as soon as signs and symptoms begin-DO NOT go       Back to bed or wait to see if you get better-TIME IS BRAIN. Warning Signs of HEART ATTACK     Call 911 if you have these symptoms:   Chest discomfort. Most heart attacks involve discomfort in the center of the chest that lasts more than a few minutes, or that goes away and comes back. It can feel like uncomfortable pressure, squeezing, fullness, or pain.  Discomfort in other areas of the upper body. Symptoms can include pain or discomfort in one or both arms, the back, neck, jaw, or stomach.  Shortness of breath with or without chest discomfort.  Other signs may include breaking out in a cold sweat, nausea, or lightheadedness. Don't wait more than five minutes to call 911 - MINUTES MATTER! Fast action can save your life. Calling 911 is almost always the fastest way to get lifesaving treatment. Emergency Medical Services staff can begin treatment when they arrive -- up to an hour sooner than if someone gets to the hospital by car.        The discharge information has been reviewed with the patient. The patient verbalized understanding. Discharge medications reviewed with the patient and appropriate educational materials and side effects teaching were provided. Patient armband removed and given to patient to take home.   Patient was informed of the privacy risks if armband lost or stolen

## 2017-01-23 NOTE — ANESTHESIA POSTPROCEDURE EVALUATION
Post-Anesthesia Evaluation and Assessment    Patient: Neel Stephen MRN: [id number]  SSN: [id number]    YOB: 1945  Age: 66 y.o. Sex: female      Data from PACU flowsheet    Cardiovascular Function/Vital Signs  Visit Vitals    /73 (BP 1 Location: Left arm, BP Patient Position: At rest)    Pulse 91    Temp 36.4 °C (97.6 °F)    Resp 18    Ht 5' 1\" (1.549 m)    Wt 56.2 kg (124 lb)    SpO2 92%    BMI 23.43 kg/m2       Patient is status post general anesthesia for Procedure(s):  cystoscopy,TRANSURETHRAL RESECTION OF BLADDER TUMOR,mitomycin c.    Nausea/Vomiting: controlled    Postoperative hydration reviewed and adequate. Pain:  Pain Scale 1: Numeric (0 - 10) (01/23/17 0950)  Pain Intensity 1: 0 (01/23/17 0950)   Managed      Mental Status and Level of Consciousness: Alert and oriented     Pulmonary Status:   O2 Device: Room air (01/23/17 0950)   Adequate oxygenation and airway patent    Complications related to anesthesia: None    Post-anesthesia assessment completed.  No concerns    Signed By: Kiana Conway MD     January 23, 2017

## 2017-02-22 ENCOUNTER — HOSPITAL ENCOUNTER (OUTPATIENT)
Dept: CT IMAGING | Age: 72
Discharge: HOME | End: 2017-02-22
Attending: INTERNAL MEDICINE
Payer: MEDICARE

## 2017-02-22 DIAGNOSIS — C34.31 PRIMARY MALIGNANT NEOPLASM OF BRONCHUS OF RIGHT LOWER LOBE (HCC): ICD-10-CM

## 2017-02-22 PROCEDURE — 74011636320 HC RX REV CODE- 636/320: Performed by: INTERNAL MEDICINE

## 2017-02-22 PROCEDURE — 74177 CT ABD & PELVIS W/CONTRAST: CPT

## 2017-02-22 RX ADMIN — IOPAMIDOL 70 ML: 612 INJECTION, SOLUTION INTRAVENOUS at 10:00

## 2017-03-02 ENCOUNTER — HOSPITAL ENCOUNTER (OUTPATIENT)
Dept: GENERAL RADIOLOGY | Age: 72
Discharge: HOME | End: 2017-03-02
Attending: INTERNAL MEDICINE
Payer: MEDICARE

## 2017-03-02 ENCOUNTER — HOSPITAL ENCOUNTER (OUTPATIENT)
Dept: BONE DENSITY | Age: 72
Discharge: HOME | End: 2017-03-02
Attending: INTERNAL MEDICINE
Payer: MEDICARE

## 2017-03-02 ENCOUNTER — HOSPITAL ENCOUNTER (OUTPATIENT)
Dept: MAMMOGRAPHY | Age: 72
Discharge: HOME | End: 2017-03-02
Attending: INTERNAL MEDICINE
Payer: MEDICARE

## 2017-03-02 DIAGNOSIS — M81.0 OSTEOPOROSIS: ICD-10-CM

## 2017-03-02 DIAGNOSIS — M25.551 RIGHT HIP PAIN: ICD-10-CM

## 2017-03-02 DIAGNOSIS — Z12.31 VISIT FOR SCREENING MAMMOGRAM: ICD-10-CM

## 2017-03-02 PROCEDURE — 73552 X-RAY EXAM OF FEMUR 2/>: CPT

## 2017-03-02 PROCEDURE — 73502 X-RAY EXAM HIP UNI 2-3 VIEWS: CPT

## 2017-03-02 PROCEDURE — 77080 DXA BONE DENSITY AXIAL: CPT

## 2017-03-02 PROCEDURE — 77067 SCR MAMMO BI INCL CAD: CPT

## 2017-03-17 ENCOUNTER — HOSPITAL ENCOUNTER (OUTPATIENT)
Dept: PET IMAGING | Age: 72
Discharge: HOME | End: 2017-03-17
Attending: INTERNAL MEDICINE
Payer: MEDICARE

## 2017-03-17 DIAGNOSIS — C34.31 PRIMARY MALIGNANT NEOPLASM OF BRONCHUS OF RIGHT LOWER LOBE (HCC): ICD-10-CM

## 2017-03-17 PROCEDURE — A9552 F18 FDG: HCPCS

## 2017-06-20 ENCOUNTER — HOSPITAL ENCOUNTER (OUTPATIENT)
Dept: CT IMAGING | Age: 72
Discharge: HOME | End: 2017-06-20
Attending: INTERNAL MEDICINE
Payer: MEDICARE

## 2017-06-20 DIAGNOSIS — C34.31 PRIMARY MALIGNANT NEOPLASM OF BRONCHUS OF RIGHT LOWER LOBE (HCC): ICD-10-CM

## 2017-06-20 LAB — CREAT UR-MCNC: 1.3 MG/DL (ref 0.6–1.3)

## 2017-06-20 PROCEDURE — 74011636320 HC RX REV CODE- 636/320: Performed by: INTERNAL MEDICINE

## 2017-06-20 PROCEDURE — 74177 CT ABD & PELVIS W/CONTRAST: CPT

## 2017-06-20 PROCEDURE — 82565 ASSAY OF CREATININE: CPT

## 2017-06-20 RX ADMIN — IOPAMIDOL 70 ML: 612 INJECTION, SOLUTION INTRAVENOUS at 09:00

## 2017-06-29 ENCOUNTER — HOSPITAL ENCOUNTER (OUTPATIENT)
Dept: GENERAL RADIOLOGY | Age: 72
Discharge: HOME | End: 2017-06-29
Payer: MEDICARE

## 2017-06-29 ENCOUNTER — HOSPITAL ENCOUNTER (OUTPATIENT)
Dept: PREADMISSION TESTING | Age: 72
Discharge: HOME | End: 2017-06-29
Payer: MEDICARE

## 2017-06-29 DIAGNOSIS — C67.9 BLADDER CANCER (HCC): ICD-10-CM

## 2017-06-29 LAB
ANION GAP BLD CALC-SCNC: 9 MMOL/L (ref 3–18)
APTT PPP: 29.4 SEC (ref 23–36.4)
ATRIAL RATE: 103 BPM
BASOPHILS # BLD AUTO: 0 K/UL (ref 0–0.1)
BASOPHILS # BLD: 1 % (ref 0–2)
BUN SERPL-MCNC: 23 MG/DL (ref 7–18)
BUN/CREAT SERPL: 17 (ref 12–20)
CALCIUM SERPL-MCNC: 10.1 MG/DL (ref 8.5–10.1)
CALCULATED P AXIS, ECG09: 36 DEGREES
CALCULATED R AXIS, ECG10: 48 DEGREES
CALCULATED T AXIS, ECG11: 180 DEGREES
CHLORIDE SERPL-SCNC: 97 MMOL/L (ref 100–108)
CO2 SERPL-SCNC: 33 MMOL/L (ref 21–32)
CREAT SERPL-MCNC: 1.32 MG/DL (ref 0.6–1.3)
DIAGNOSIS, 93000: NORMAL
DIFFERENTIAL METHOD BLD: ABNORMAL
EOSINOPHIL # BLD: 0.1 K/UL (ref 0–0.4)
EOSINOPHIL NFR BLD: 2 % (ref 0–5)
ERYTHROCYTE [DISTWIDTH] IN BLOOD BY AUTOMATED COUNT: 14.4 % (ref 11.6–14.5)
GLUCOSE SERPL-MCNC: 96 MG/DL (ref 74–99)
HCT VFR BLD AUTO: 43.1 % (ref 35–45)
HGB BLD-MCNC: 14.5 G/DL (ref 12–16)
INR PPP: 1 (ref 0.8–1.2)
LYMPHOCYTES # BLD AUTO: 31 % (ref 21–52)
LYMPHOCYTES # BLD: 1.9 K/UL (ref 0.9–3.6)
MCH RBC QN AUTO: 30.7 PG (ref 24–34)
MCHC RBC AUTO-ENTMCNC: 33.6 G/DL (ref 31–37)
MCV RBC AUTO: 91.3 FL (ref 74–97)
MONOCYTES # BLD: 0.7 K/UL (ref 0.05–1.2)
MONOCYTES NFR BLD AUTO: 12 % (ref 3–10)
NEUTS SEG # BLD: 3.4 K/UL (ref 1.8–8)
NEUTS SEG NFR BLD AUTO: 54 % (ref 40–73)
P-R INTERVAL, ECG05: 128 MS
PLATELET # BLD AUTO: 289 K/UL (ref 135–420)
PMV BLD AUTO: 9.7 FL (ref 9.2–11.8)
POTASSIUM SERPL-SCNC: 3.3 MMOL/L (ref 3.5–5.5)
PROTHROMBIN TIME: 12.4 SEC (ref 11.5–15.2)
Q-T INTERVAL, ECG07: 348 MS
QRS DURATION, ECG06: 74 MS
QTC CALCULATION (BEZET), ECG08: 455 MS
RBC # BLD AUTO: 4.72 M/UL (ref 4.2–5.3)
SODIUM SERPL-SCNC: 139 MMOL/L (ref 136–145)
VENTRICULAR RATE, ECG03: 103 BPM
WBC # BLD AUTO: 6.1 K/UL (ref 4.6–13.2)

## 2017-06-29 PROCEDURE — 93005 ELECTROCARDIOGRAM TRACING: CPT

## 2017-06-29 PROCEDURE — 71020 XR CHEST PA LAT: CPT

## 2017-06-29 PROCEDURE — 87086 URINE CULTURE/COLONY COUNT: CPT | Performed by: UROLOGY

## 2017-06-29 PROCEDURE — 36415 COLL VENOUS BLD VENIPUNCTURE: CPT | Performed by: UROLOGY

## 2017-06-29 PROCEDURE — 85730 THROMBOPLASTIN TIME PARTIAL: CPT | Performed by: UROLOGY

## 2017-06-29 PROCEDURE — 87077 CULTURE AEROBIC IDENTIFY: CPT | Performed by: UROLOGY

## 2017-06-29 PROCEDURE — 85610 PROTHROMBIN TIME: CPT | Performed by: UROLOGY

## 2017-06-29 PROCEDURE — 80048 BASIC METABOLIC PNL TOTAL CA: CPT | Performed by: UROLOGY

## 2017-06-29 PROCEDURE — 85025 COMPLETE CBC W/AUTO DIFF WBC: CPT | Performed by: UROLOGY

## 2017-06-30 ENCOUNTER — ANESTHESIA EVENT (OUTPATIENT)
Dept: SURGERY | Age: 72
End: 2017-06-30
Payer: MEDICARE

## 2017-07-01 LAB
BACTERIA SPEC CULT: ABNORMAL
SERVICE CMNT-IMP: ABNORMAL

## 2017-07-03 ENCOUNTER — APPOINTMENT (OUTPATIENT)
Dept: GENERAL RADIOLOGY | Age: 72
End: 2017-07-03
Attending: UROLOGY
Payer: MEDICARE

## 2017-07-03 ENCOUNTER — ANESTHESIA (OUTPATIENT)
Dept: SURGERY | Age: 72
End: 2017-07-03
Payer: MEDICARE

## 2017-07-03 ENCOUNTER — HOSPITAL ENCOUNTER (OUTPATIENT)
Age: 72
Setting detail: OUTPATIENT SURGERY
Discharge: HOME | End: 2017-07-03
Attending: UROLOGY | Admitting: UROLOGY
Payer: MEDICARE

## 2017-07-03 VITALS
OXYGEN SATURATION: 96 % | HEIGHT: 61 IN | RESPIRATION RATE: 18 BRPM | HEART RATE: 104 BPM | DIASTOLIC BLOOD PRESSURE: 71 MMHG | SYSTOLIC BLOOD PRESSURE: 117 MMHG | WEIGHT: 123.56 LBS | TEMPERATURE: 98.1 F | BODY MASS INDEX: 23.33 KG/M2

## 2017-07-03 PROCEDURE — 74011250636 HC RX REV CODE- 250/636

## 2017-07-03 PROCEDURE — 74011250637 HC RX REV CODE- 250/637: Performed by: NURSE ANESTHETIST, CERTIFIED REGISTERED

## 2017-07-03 PROCEDURE — 88307 TISSUE EXAM BY PATHOLOGIST: CPT | Performed by: UROLOGY

## 2017-07-03 PROCEDURE — 77030011640 HC PAD GRND REM COVD -A: Performed by: UROLOGY

## 2017-07-03 PROCEDURE — 77030008683 HC TU ET CUF COVD -A: Performed by: ANESTHESIOLOGY

## 2017-07-03 PROCEDURE — 74011250636 HC RX REV CODE- 250/636: Performed by: NURSE ANESTHETIST, CERTIFIED REGISTERED

## 2017-07-03 PROCEDURE — 76060000032 HC ANESTHESIA 0.5 TO 1 HR: Performed by: UROLOGY

## 2017-07-03 PROCEDURE — 77030018831 HC SOL IRR H20 BAXT -A: Performed by: UROLOGY

## 2017-07-03 PROCEDURE — 76210000016 HC OR PH I REC 1 TO 1.5 HR: Performed by: UROLOGY

## 2017-07-03 PROCEDURE — 77030032490 HC SLV COMPR SCD KNE COVD -B: Performed by: UROLOGY

## 2017-07-03 PROCEDURE — 77030018706 HC CORD MPLR COVD -A: Performed by: UROLOGY

## 2017-07-03 PROCEDURE — 88305 TISSUE EXAM BY PATHOLOGIST: CPT | Performed by: UROLOGY

## 2017-07-03 PROCEDURE — 74011000258 HC RX REV CODE- 258

## 2017-07-03 PROCEDURE — 76010000138 HC OR TIME 0.5 TO 1 HR: Performed by: UROLOGY

## 2017-07-03 PROCEDURE — 77030018836 HC SOL IRR NACL ICUM -A: Performed by: UROLOGY

## 2017-07-03 PROCEDURE — 77030012884 HC SLV COMPR SCD MTEK -B: Performed by: UROLOGY

## 2017-07-03 PROCEDURE — 74011000250 HC RX REV CODE- 250

## 2017-07-03 PROCEDURE — 76210000021 HC REC RM PH II 0.5 TO 1 HR: Performed by: UROLOGY

## 2017-07-03 PROCEDURE — 74011250636 HC RX REV CODE- 250/636: Performed by: UROLOGY

## 2017-07-03 PROCEDURE — 77030018846 HC SOL IRR STRL H20 ICUM -A: Performed by: UROLOGY

## 2017-07-03 PROCEDURE — C1758 CATHETER, URETERAL: HCPCS | Performed by: UROLOGY

## 2017-07-03 PROCEDURE — 77030021163 HC TUBE CYSTO IRR ICUM -A: Performed by: UROLOGY

## 2017-07-03 RX ORDER — SODIUM CHLORIDE, SODIUM LACTATE, POTASSIUM CHLORIDE, CALCIUM CHLORIDE 600; 310; 30; 20 MG/100ML; MG/100ML; MG/100ML; MG/100ML
50 INJECTION, SOLUTION INTRAVENOUS CONTINUOUS
Status: DISCONTINUED | OUTPATIENT
Start: 2017-07-04 | End: 2017-07-03 | Stop reason: HOSPADM

## 2017-07-03 RX ORDER — MAGNESIUM SULFATE 100 %
4 CRYSTALS MISCELLANEOUS AS NEEDED
Status: DISCONTINUED | OUTPATIENT
Start: 2017-07-03 | End: 2017-07-03 | Stop reason: HOSPADM

## 2017-07-03 RX ORDER — SODIUM CHLORIDE 0.9 % (FLUSH) 0.9 %
5-10 SYRINGE (ML) INJECTION AS NEEDED
Status: DISCONTINUED | OUTPATIENT
Start: 2017-07-03 | End: 2017-07-03 | Stop reason: HOSPADM

## 2017-07-03 RX ORDER — FAMOTIDINE 20 MG/1
TABLET, FILM COATED ORAL
Status: DISCONTINUED
Start: 2017-07-03 | End: 2017-07-03 | Stop reason: HOSPADM

## 2017-07-03 RX ORDER — DEXTROSE MONOHYDRATE 25 G/50ML
25-50 INJECTION, SOLUTION INTRAVENOUS AS NEEDED
Status: DISCONTINUED | OUTPATIENT
Start: 2017-07-03 | End: 2017-07-03 | Stop reason: HOSPADM

## 2017-07-03 RX ORDER — OXYCODONE AND ACETAMINOPHEN 5; 325 MG/1; MG/1
1-2 TABLET ORAL
Qty: 30 TAB | Refills: 0 | Status: SHIPPED | OUTPATIENT
Start: 2017-07-03 | End: 2017-08-24

## 2017-07-03 RX ORDER — CIPROFLOXACIN 2 MG/ML
400 INJECTION, SOLUTION INTRAVENOUS ONCE
Status: COMPLETED | OUTPATIENT
Start: 2017-07-03 | End: 2017-07-03

## 2017-07-03 RX ORDER — LIDOCAINE HYDROCHLORIDE 20 MG/ML
INJECTION, SOLUTION EPIDURAL; INFILTRATION; INTRACAUDAL; PERINEURAL AS NEEDED
Status: DISCONTINUED | OUTPATIENT
Start: 2017-07-03 | End: 2017-07-03 | Stop reason: HOSPADM

## 2017-07-03 RX ORDER — DOCUSATE SODIUM 100 MG/1
100 CAPSULE, LIQUID FILLED ORAL
Qty: 60 CAP | Refills: 2 | Status: SHIPPED | OUTPATIENT
Start: 2017-07-03 | End: 2017-10-01

## 2017-07-03 RX ORDER — CIPROFLOXACIN 2 MG/ML
INJECTION, SOLUTION INTRAVENOUS AS NEEDED
Status: DISCONTINUED | OUTPATIENT
Start: 2017-07-03 | End: 2017-07-03 | Stop reason: HOSPADM

## 2017-07-03 RX ORDER — NITROFURANTOIN 25; 75 MG/1; MG/1
100 CAPSULE ORAL 2 TIMES DAILY
Qty: 20 CAP | Refills: 0 | Status: SHIPPED | OUTPATIENT
Start: 2017-07-03 | End: 2017-09-05 | Stop reason: SDUPTHER

## 2017-07-03 RX ORDER — SUCCINYLCHOLINE CHLORIDE 20 MG/ML
INJECTION INTRAMUSCULAR; INTRAVENOUS AS NEEDED
Status: DISCONTINUED | OUTPATIENT
Start: 2017-07-03 | End: 2017-07-03 | Stop reason: HOSPADM

## 2017-07-03 RX ORDER — FAMOTIDINE 20 MG/1
20 TABLET, FILM COATED ORAL ONCE
Status: COMPLETED | OUTPATIENT
Start: 2017-07-04 | End: 2017-07-03

## 2017-07-03 RX ORDER — FENTANYL CITRATE 50 UG/ML
INJECTION, SOLUTION INTRAMUSCULAR; INTRAVENOUS AS NEEDED
Status: DISCONTINUED | OUTPATIENT
Start: 2017-07-03 | End: 2017-07-03 | Stop reason: HOSPADM

## 2017-07-03 RX ORDER — PROPOFOL 10 MG/ML
INJECTION, EMULSION INTRAVENOUS AS NEEDED
Status: DISCONTINUED | OUTPATIENT
Start: 2017-07-03 | End: 2017-07-03 | Stop reason: HOSPADM

## 2017-07-03 RX ORDER — SODIUM CHLORIDE, SODIUM LACTATE, POTASSIUM CHLORIDE, CALCIUM CHLORIDE 600; 310; 30; 20 MG/100ML; MG/100ML; MG/100ML; MG/100ML
125 INJECTION, SOLUTION INTRAVENOUS CONTINUOUS
Status: DISCONTINUED | OUTPATIENT
Start: 2017-07-03 | End: 2017-07-03 | Stop reason: HOSPADM

## 2017-07-03 RX ORDER — HYDROMORPHONE HYDROCHLORIDE 2 MG/ML
0.5 INJECTION, SOLUTION INTRAMUSCULAR; INTRAVENOUS; SUBCUTANEOUS AS NEEDED
Status: DISCONTINUED | OUTPATIENT
Start: 2017-07-03 | End: 2017-07-03 | Stop reason: HOSPADM

## 2017-07-03 RX ORDER — MIDAZOLAM HYDROCHLORIDE 1 MG/ML
INJECTION, SOLUTION INTRAMUSCULAR; INTRAVENOUS AS NEEDED
Status: DISCONTINUED | OUTPATIENT
Start: 2017-07-03 | End: 2017-07-03 | Stop reason: HOSPADM

## 2017-07-03 RX ORDER — LIDOCAINE HYDROCHLORIDE 10 MG/ML
0.1 INJECTION, SOLUTION EPIDURAL; INFILTRATION; INTRACAUDAL; PERINEURAL AS NEEDED
Status: DISCONTINUED | OUTPATIENT
Start: 2017-07-03 | End: 2017-07-03 | Stop reason: HOSPADM

## 2017-07-03 RX ORDER — INSULIN LISPRO 100 [IU]/ML
INJECTION, SOLUTION INTRAVENOUS; SUBCUTANEOUS ONCE
Status: DISCONTINUED | OUTPATIENT
Start: 2017-07-03 | End: 2017-07-03 | Stop reason: HOSPADM

## 2017-07-03 RX ORDER — ONDANSETRON 2 MG/ML
4 INJECTION INTRAMUSCULAR; INTRAVENOUS ONCE
Status: DISCONTINUED | OUTPATIENT
Start: 2017-07-03 | End: 2017-07-03 | Stop reason: HOSPADM

## 2017-07-03 RX ADMIN — PROPOFOL 25 MG: 10 INJECTION, EMULSION INTRAVENOUS at 13:16

## 2017-07-03 RX ADMIN — SODIUM CHLORIDE, SODIUM LACTATE, POTASSIUM CHLORIDE, AND CALCIUM CHLORIDE 50 ML/HR: 600; 310; 30; 20 INJECTION, SOLUTION INTRAVENOUS at 11:28

## 2017-07-03 RX ADMIN — PROPOFOL 100 MG: 10 INJECTION, EMULSION INTRAVENOUS at 13:05

## 2017-07-03 RX ADMIN — FENTANYL CITRATE 50 MCG: 50 INJECTION, SOLUTION INTRAMUSCULAR; INTRAVENOUS at 13:16

## 2017-07-03 RX ADMIN — MIDAZOLAM HYDROCHLORIDE 2 MG: 1 INJECTION, SOLUTION INTRAMUSCULAR; INTRAVENOUS at 12:59

## 2017-07-03 RX ADMIN — FENTANYL CITRATE 50 MCG: 50 INJECTION, SOLUTION INTRAMUSCULAR; INTRAVENOUS at 13:05

## 2017-07-03 RX ADMIN — LIDOCAINE HYDROCHLORIDE 100 MG: 20 INJECTION, SOLUTION EPIDURAL; INFILTRATION; INTRACAUDAL; PERINEURAL at 13:05

## 2017-07-03 RX ADMIN — SUCCINYLCHOLINE CHLORIDE 100 MG: 20 INJECTION INTRAMUSCULAR; INTRAVENOUS at 13:05

## 2017-07-03 RX ADMIN — FAMOTIDINE 20 MG: 20 TABLET ORAL at 11:14

## 2017-07-03 RX ADMIN — CIPROFLOXACIN 400 MG: 2 INJECTION, SOLUTION INTRAVENOUS at 11:51

## 2017-07-03 RX ADMIN — CIPROFLOXACIN 400 MG: 2 INJECTION, SOLUTION INTRAVENOUS at 13:12

## 2017-07-03 NOTE — ANESTHESIA POSTPROCEDURE EVALUATION
Post-Anesthesia Evaluation and Assessment    Patient: Clarice Brambila MRN: [id number]  SSN: [id number]    YOB: 1945  Age: 66 y.o. Sex: female       Cardiovascular Function/Vital Signs  Visit Vitals    /88    Pulse 97    Temp 36.7 °C (98.1 °F)    Resp 17    Ht 5' 1\" (1.549 m)    Wt 56 kg (123 lb 9 oz)    SpO2 100%    BMI 23.35 kg/m2       Patient is status post general anesthesia for Procedure(s):  cystoscopy,, TRANSURETHRAL RESECTION OF BLADDER TUMOR,FULGURATION. Nausea/Vomiting: None    Postoperative hydration reviewed and adequate. Pain:  Pain Scale 1: Numeric (0 - 10) (07/03/17 1350)  Pain Intensity 1: 0 (07/03/17 1350)   Managed    Neurological Status:   Neuro (WDL): Within Defined Limits (07/03/17 1350)   At baseline    Mental Status and Level of Consciousness: Alert and oriented     Pulmonary Status:   O2 Device: Oxygen mask (07/03/17 1350)   Adequate oxygenation and airway patent    Complications related to anesthesia: None    Post-anesthesia assessment completed.  No concerns    Signed By: Salome Aquino CRNA     July 3, 2017

## 2017-07-03 NOTE — DISCHARGE INSTRUCTIONS
Cystoscopy: What to Expect at [facility]    A cystoscopy is a procedure that lets a doctor look inside of the bladder and the urethra. The urethra is the tube that carries urine from the bladder to outside the body. The doctor uses a thin, lighted tool called a cystoscope. Your bladder is filled with fluid. This stretches the bladder so that your doctor can look closely at the inside of your bladder. After the cystoscopy, your urethra may be sore at first, and it may burn when you urinate for the first few days after the procedure. You may feel the need to urinate more often, and your urine may be pink. These symptoms should get better in 1 or 2 days. You will probably be able to go back to most of your usual activities in 1 or 2 days. This care sheet gives you a general idea about how long it will take for you to recover. But each person recovers at a different pace. Follow the steps below to get better as quickly as possible. How can you care for yourself at home? Activity  · Rest when you feel tired. Getting enough sleep will help you recover. · Try to walk each day. Start by walking a little more than you did the day before. Bit by bit, increase the amount you walk. Walking boosts blood flow and helps prevent pneumonia and constipation. · Avoid strenuous activities, such as bicycle riding, jogging, weight lifting, or aerobic exercise, until your doctor says it is okay. · Ask your doctor when you can drive again. · Most people are able to return to work within 1 or 2 days after the procedure. · You may shower and take baths as usual.  · Ask your doctor when it is okay for you to have sex. Diet  · You can eat your normal diet. If your stomach is upset, try bland, low-fat foods like plain rice, broiled chicken, toast, and yogurt. · Drink plenty of fluids (unless your doctor tells you not to). Medicines  · Take pain medicines exactly as directed.   ¨ If the doctor gave you a prescription medicine for pain, take it as prescribed. ¨ If you are not taking a prescription pain medicine, ask your doctor if you can take an over-the-counter medicine. · If you think your pain medicine is making you sick to your stomach:  ¨ Take your medicine after meals (unless your doctor has told you not to). ¨ Ask your doctor for a different pain medicine. · If your doctor prescribed antibiotics, take them as directed. Do not stop taking them just because you feel better. You need to take the full course of antibiotics. Follow-up care is a key part of your treatment and safety. Be sure to make and go to all appointments, and call your doctor if you are having problems. It's also a good idea to know your test results and keep a list of the medicines you take. When should you call for help? Call 911 anytime you think you may need emergency care. For example, call if:  · You passed out (lost consciousness). · You have severe trouble breathing. · You have sudden chest pain and shortness of breath, or you cough up blood. · You have severe belly pain. Call your doctor now or seek immediate medical care if:  · You are sick to your stomach or cannot keep fluids down. · Your urine is still red or you see blood clots after you have urinated several times. · You have trouble passing urine or stool, especially if you have pain or swelling in your lower belly. · You have signs of a blood clot, such as:  ¨ Pain in your calf, back of the knee, thigh, or groin. ¨ Redness and swelling in your leg or groin. · You develop a fever or severe chills. · You have pain in your back just below your rib cage. This is called flank pain. Watch closely for changes in your health, and be sure to contact your doctor if:  · You have pain or burning when you urinate. A burning feeling is normal for a day or two after the test, but call if it does not get better.   · You have a frequent urge to urinate but can pass only small amounts of urine.  · Your urine is pink, red, or cloudy, or smells bad. It is normal for the urine to have a pinkish color for a few days after the test, but call if it does not get better. Where can you learn more? Go to http://gabby-hiro.info/. Enter C490 in the search box to learn more about \"Cystoscopy: What to Expect at Home. \"  Current as of: November 11, 2016  Content Version: 11.3  © 5237-9438 [employer]. Care instructions adapted under license by [employer] (which disclaims liability or warranty for this information). If you have questions about a medical condition or this instruction, always ask your healthcare professional. [place] [address] any warranty or liability for your use of this information. DISCHARGE SUMMARY from Nurse    The following personal items are in your possession at time of discharge:    Dental Appliances: Partials, Lowers, Uppers  Visual Aid: With patient     Home Medications: None  Jewelry: None  Clothing: Footwear, Pants, Shirt, Undergarments  Other Valuables: None             PATIENT INSTRUCTIONS:    After general anesthesia or intravenous sedation, for 24 hours or while taking prescription Narcotics:  · Limit your activities  · Do not drive and operate hazardous machinery  · Do not make important personal or business decisions  · Do  not drink alcoholic beverages  · If you have not urinated within 8 hours after discharge, please contact your surgeon on call.     Report the following to your surgeon:  · Excessive pain, swelling, redness or odor of or around the surgical area  · Temperature over 100.5  · Nausea and vomiting lasting longer than 4 hours or if unable to take medications  · Any signs of decreased circulation or nerve impairment to extremity: change in color, persistent  numbness, tingling, coldness or increase pain  · Any questions        What to do at Home:        These are general instructions for a healthy lifestyle:    No smoking/ No tobacco products/ Avoid exposure to second hand smoke    Surgeon General's Warning:  Quitting smoking now greatly reduces serious risk to your health. Obesity, smoking, and sedentary lifestyle greatly increases your risk for illness    A healthy diet, regular physical exercise & weight monitoring are important for maintaining a healthy lifestyle    You may be retaining fluid if you have a history of heart failure or if you experience any of the following symptoms:  Weight gain of 3 pounds or more overnight or 5 pounds in a week, increased swelling in our hands or feet or shortness of breath while lying flat in bed. Please call your doctor as soon as you notice any of these symptoms; do not wait until your next office visit. Recognize signs and symptoms of STROKE:    F-face looks uneven    A-arms unable to move or move unevenly    S-speech slurred or non-existent    T-time-call 911 as soon as signs and symptoms begin-DO NOT go       Back to bed or wait to see if you get better-TIME IS BRAIN. Warning Signs of HEART ATTACK     Call 911 if you have these symptoms:   Chest discomfort. Most heart attacks involve discomfort in the center of the chest that lasts more than a few minutes, or that goes away and comes back. It can feel like uncomfortable pressure, squeezing, fullness, or pain.  Discomfort in other areas of the upper body. Symptoms can include pain or discomfort in one or both arms, the back, neck, jaw, or stomach.  Shortness of breath with or without chest discomfort.  Other signs may include breaking out in a cold sweat, nausea, or lightheadedness. Don't wait more than five minutes to call 911 - MINUTES MATTER! Fast action can save your life. Calling 911 is almost always the fastest way to get lifesaving treatment. Emergency Medical Services staff can begin treatment when they arrive -- up to an hour sooner than if someone gets to the hospital by car.        The discharge information has been reviewed with the patient. The patient verbalized understanding. Discharge medications reviewed with the patient and appropriate educational materials and side effects teaching were provided. Patient armband removed and given to patient to take home.   Patient was informed of the privacy risks if armband lost or stolen

## 2017-07-03 NOTE — IP AVS SNAPSHOT
[address] [place] [address] [address] 
[phone] Patient: Celeste Welch MRN: [id number] [facility]:54/4/4725 You are allergic to the following No active allergies Recent Documentation Height Weight BMI OB Status Smoking Status 1.549 m 56 kg 23.35 kg/m2 Hysterectomy Former Smoker Emergency Contacts Name Discharge Info Relation Home Work Mobile [facility] DISCHARGE CAREGIVER [3] Daughter [21] [phone] Stefan Oxford  Child [2] [phone] About your hospitalization You were admitted on:  July 3, 2017 You last received care in the:  [facility] PACU You were discharged on:  July 3, 2017 Unit phone number:  [phone] Why you were hospitalized Your primary diagnosis was:  Not on File Providers Seen During Your Hospitalizations Provider Role Specialty Primary office phone Evelina García MD Attending Provider Urology [phone] Your Primary Care Physician (PCP) Primary Care Physician Office Phone Office Fax [phone], [phone] Follow-up Information Follow up With Details Comments Contact Info [facility]  [facility] [address] [place] [address] 
[phone] Your Appointments Tuesday July 18, 2017  1:00 PM EDT  
ESTABLISHED PATIENT with Evelina García MD  
[facility] [address] [facility], [facility] [address] [place] [address]  
[phone] Current Discharge Medication List  
  
START taking these medications Dose & Instructions Dispensing Information Comments Morning Noon Evening Bedtime  
 docusate sodium 100 mg capsule Commonly known as:  Fernando Rider Your last dose was: Your next dose is:    
   
   
 Dose:  100 mg Take 1 Cap by mouth two (2) times daily as needed for Constipation for up to 90 days. Quantity:  60 Cap Refills:  2  
     
   
   
   
  
 nitrofurantoin (macrocrystal-monohydrate) 100 mg capsule Commonly known as:  MACROBID Your last dose was: Your next dose is:    
   
   
 Dose:  100 mg Take 1 Cap by mouth two (2) times a day. Quantity:  20 Cap Refills:  0  
     
   
   
   
  
 oxyCODONE-acetaminophen 5-325 mg per tablet Commonly known as:  PERCOCET Your last dose was: Your next dose is:    
   
   
 Dose:  1-2 Tab Take 1-2 Tabs by mouth every four (4) hours as needed for Pain. Max Daily Amount: 12 Tabs. Quantity:  30 Tab Refills:  0 CONTINUE these medications which have NOT CHANGED Dose & Instructions Dispensing Information Comments Morning Noon Evening Bedtime  
 amLODIPine 5 mg tablet Commonly known as:  Keanu Mccarthy Your last dose was: Your next dose is:    
   
   
 Dose:  5 mg Take 5 mg by mouth daily. Refills:  0  
     
   
   
   
  
 CALCIUM 600 PO Your last dose was: Your next dose is: Take  by mouth. Refills:  0 DULERA 100-5 mcg/actuation HFA inhaler Generic drug:  mometasone-formoterol Your last dose was: Your next dose is:    
   
   
 Dose:  1 Puff Take 1 Puff by inhalation daily. Refills:  0  
     
   
   
   
  
 hydroCHLOROthiazide 25 mg tablet Commonly known as:  HYDRODIURIL Your last dose was: Your next dose is:    
   
   
 Dose:  25 mg Take 25 mg by mouth daily. Refills:  0  
     
   
   
   
  
 ibandronate 150 mg tablet Commonly known as:  Lujean Amelia Your last dose was: Your next dose is:    
   
   
 Dose:  150 mg Take 150 mg by mouth every thirty (30) days. Refills:  0  
     
   
   
   
  
 meloxicam 15 mg tablet Commonly known as:  MOBIC Your last dose was:     
   
Your next dose is:    
   
   
 Dose:  15 mg  
 Take 15 mg by mouth daily. Refills:  0  
     
   
   
   
  
 potassium chloride 40 mEq/15 mL Liqd Commonly known as:  KAON 20% Your last dose was: Your next dose is:    
   
   
 Dose:  60 mEq Take 22.5 mL by mouth daily. Indications: hypokalemia Quantity:  480 mL Refills:  3 VITAMIN D3 1,000 unit tablet Generic drug:  cholecalciferol Your last dose was: Your next dose is: Take  by mouth daily. Refills:  0 Where to Get Your Medications Information on where to get these meds will be given to you by the nurse or doctor. ! Ask your nurse or doctor about these medications  
  docusate sodium 100 mg capsule  
 nitrofurantoin (macrocrystal-monohydrate) 100 mg capsule  
 oxyCODONE-acetaminophen 5-325 mg per tablet Discharge Instructions Cystoscopy: What to Expect at [facility] Your Recovery A cystoscopy is a procedure that lets a doctor look inside of the bladder and the urethra. The urethra is the tube that carries urine from the bladder to outside the body. The doctor uses a thin, lighted tool called a cystoscope. Your bladder is filled with fluid. This stretches the bladder so that your doctor can look closely at the inside of your bladder. After the cystoscopy, your urethra may be sore at first, and it may burn when you urinate for the first few days after the procedure. You may feel the need to urinate more often, and your urine may be pink. These symptoms should get better in 1 or 2 days. You will probably be able to go back to most of your usual activities in 1 or 2 days. This care sheet gives you a general idea about how long it will take for you to recover. But each person recovers at a different pace. Follow the steps below to get better as quickly as possible. How can you care for yourself at home? Activity · Rest when you feel tired. Getting enough sleep will help you recover. · Try to walk each day. Start by walking a little more than you did the day before. Bit by bit, increase the amount you walk. Walking boosts blood flow and helps prevent pneumonia and constipation. · Avoid strenuous activities, such as bicycle riding, jogging, weight lifting, or aerobic exercise, until your doctor says it is okay. · Ask your doctor when you can drive again. · Most people are able to return to work within 1 or 2 days after the procedure. · You may shower and take baths as usual. 
· Ask your doctor when it is okay for you to have sex. Diet · You can eat your normal diet. If your stomach is upset, try bland, low-fat foods like plain rice, broiled chicken, toast, and yogurt. · Drink plenty of fluids (unless your doctor tells you not to). Medicines · Take pain medicines exactly as directed. ¨ If the doctor gave you a prescription medicine for pain, take it as prescribed. ¨ If you are not taking a prescription pain medicine, ask your doctor if you can take an over-the-counter medicine. · If you think your pain medicine is making you sick to your stomach: 
¨ Take your medicine after meals (unless your doctor has told you not to). ¨ Ask your doctor for a different pain medicine. · If your doctor prescribed antibiotics, take them as directed. Do not stop taking them just because you feel better. You need to take the full course of antibiotics. Follow-up care is a key part of your treatment and safety. Be sure to make and go to all appointments, and call your doctor if you are having problems. It's also a good idea to know your test results and keep a list of the medicines you take. When should you call for help? Call 911 anytime you think you may need emergency care. For example, call if: 
· You passed out (lost consciousness). · You have severe trouble breathing. · You have sudden chest pain and shortness of breath, or you cough up blood. · You have severe belly pain. Call your doctor now or seek immediate medical care if: 
· You are sick to your stomach or cannot keep fluids down. · Your urine is still red or you see blood clots after you have urinated several times. · You have trouble passing urine or stool, especially if you have pain or swelling in your lower belly. · You have signs of a blood clot, such as: 
¨ Pain in your calf, back of the knee, thigh, or groin. ¨ Redness and swelling in your leg or groin. · You develop a fever or severe chills. · You have pain in your back just below your rib cage. This is called flank pain. Watch closely for changes in your health, and be sure to contact your doctor if: 
· You have pain or burning when you urinate. A burning feeling is normal for a day or two after the test, but call if it does not get better. · You have a frequent urge to urinate but can pass only small amounts of urine. · Your urine is pink, red, or cloudy, or smells bad. It is normal for the urine to have a pinkish color for a few days after the test, but call if it does not get better. Where can you learn more? Go to http://gabby-hiro.info/. Enter O120 in the search box to learn more about \"Cystoscopy: What to Expect at Home. \" Current as of: November 11, 2016 Content Version: 11.3 © 8544-6858 [employer]. Care instructions adapted under license by [employer] (which disclaims liability or warranty for this information). If you have questions about a medical condition or this instruction, always ask your healthcare professional. [place] [address] any warranty or liability for your use of this information. DISCHARGE SUMMARY from Nurse The following personal items are in your possession at time of discharge: 
 
Dental Appliances: Partials, Lowers, Uppers Visual Aid: With patient Home Medications: None Jewelry: None Clothing: Footwear, Pants, Shirt, Undergarments Other Valuables: None PATIENT INSTRUCTIONS: 
 
After general anesthesia or intravenous sedation, for 24 hours or while taking prescription Narcotics: · Limit your activities · Do not drive and operate hazardous machinery · Do not make important personal or business decisions · Do  not drink alcoholic beverages · If you have not urinated within 8 hours after discharge, please contact your surgeon on call. Report the following to your surgeon: 
· Excessive pain, swelling, redness or odor of or around the surgical area · Temperature over 100.5 · Nausea and vomiting lasting longer than 4 hours or if unable to take medications · Any signs of decreased circulation or nerve impairment to extremity: change in color, persistent  numbness, tingling, coldness or increase pain · Any questions What to do at Home: These are general instructions for a healthy lifestyle: No smoking/ No tobacco products/ Avoid exposure to second hand smoke Surgeon General's Warning:  Quitting smoking now greatly reduces serious risk to your health. Obesity, smoking, and sedentary lifestyle greatly increases your risk for illness A healthy diet, regular physical exercise & weight monitoring are important for maintaining a healthy lifestyle You may be retaining fluid if you have a history of heart failure or if you experience any of the following symptoms:  Weight gain of 3 pounds or more overnight or 5 pounds in a week, increased swelling in our hands or feet or shortness of breath while lying flat in bed. Please call your doctor as soon as you notice any of these symptoms; do not wait until your next office visit. Recognize signs and symptoms of STROKE: 
 
F-face looks uneven A-arms unable to move or move unevenly S-speech slurred or non-existent T-time-call 911 as soon as signs and symptoms begin-DO NOT go Back to bed or wait to see if you get better-TIME IS BRAIN. Warning Signs of HEART ATTACK Call 911 if you have these symptoms: 
? Chest discomfort. Most heart attacks involve discomfort in the center of the chest that lasts more than a few minutes, or that goes away and comes back. It can feel like uncomfortable pressure, squeezing, fullness, or pain. ? Discomfort in other areas of the upper body. Symptoms can include pain or discomfort in one or both arms, the back, neck, jaw, or stomach. ? Shortness of breath with or without chest discomfort. ? Other signs may include breaking out in a cold sweat, nausea, or lightheadedness. Don't wait more than five minutes to call [address]! Fast action can save your life. Calling 911 is almost always the fastest way to get lifesaving treatment. Emergency Medical Services staff can begin treatment when they arrive  up to an hour sooner than if someone gets to the hospital by car. The discharge information has been reviewed with the patient. The patient verbalized understanding. Discharge medications reviewed with the patient and appropriate educational materials and side effects teaching were provided. Patient armband removed and given to patient to take home. Patient was informed of the privacy risks if armband lost or stolen Discharge Orders None Introducing [facility]! [employer] introduces [email or web address] patient portal. Now you can access parts of your medical record, email your doctor's office, and request medication refills online. 1. In your internet browser, go to [email or web address] 2. Click on the First Time User? Click Here link in the Sign In box. You will see the New Member Sign Up page. 3. Enter your [email or web address].com Access Code exactly as it appears below.  You will not need to use this code after youve completed the sign-up process. If you do not sign up before the expiration date, you must request a new code. · "[employer] Access Code: [email or web address] Expires: 9/12/2017  9:32 AM 
 
4. Enter the last four digits of your Social Security Number (xxxx) and Date of Birth (mm/dd/yyyy) as indicated and click Submit. You will be taken to the next sign-up page. 5. Create a "[employer] ID. This will be your "[employer] login ID and cannot be changed, so think of one that is secure and easy to remember. 6. Create a "[employer] password. You can change your password at any time. 7. Enter your Password Reset Question and Answer. This can be used at a later time if you forget your password. 8. Enter your e-mail address. You will receive e-mail notification when new information is available in [address] 9. Click Sign Up. You can now view and download portions of your medical record. 10. Click the Download Summary menu link to download a portable copy of your medical information. If you have questions, please visit the Frequently Asked Questions section of the "[employer] website. Remember, "[employer] is NOT to be used for urgent needs. For medical emergencies, dial 911. Now available from your iPhone and Android! General Information Please provide this summary of care documentation to your next provider. Patient Signature:  ____________________________________________________________ Date:  ____________________________________________________________  
  
Lisa Coelho Provider Signature:  ____________________________________________________________ Date:  ____________________________________________________________

## 2017-07-03 NOTE — H&P
History and physical review. The patient has been examined. There have been no significant clinical changes. NAD, CTAB, RRR    NA Side marked  Cipro On call to OR  Consented  Plan to proceed with Cysto, TURBT        Urology      []Hide copied text             ASSESSMENT:       ICD-10-CM ICD-9-CM     1. Malignant neoplasm of urinary bladder, unspecified site (HCC) C67.9 188. 9        71yoF with hx of lung carcinoma s/p stereotatic XRT x 2 ('14'16) noted on follow up imaging to have a bladder mass                         S/p cysto 12/14/16 left posterior wall tumor approximately 1cm. Surgical pathology non-invasive papillary urothelial carcinoma, high grade.          PLAN:     Reviewed pt's pathology in detail w/ pt and family member. Will start BCG x 6 treatments to start in 2 weeks followed by cysto 4 weeks after completion of the last BCG  Informed pt of associated side effects. RTC in 4 weeks after BCG for cysto. R/B discussed            Chief Complaint   Patient presents with    Bladder Cancer         HISTORY OF PRESENT ILLNESS:  Varun Armas is a 66 y.o. female who is seen in follow up for newly diagnosed bladder cancer. Pt presented due to an incidental finding of a 1 cm urinary bladder mass. S/p cysto 12/14/16 left posterior wall tumor approximately 1cm. Following procedure pt referred for a bladder biopsy.      Today, she returns to review her pathology, results below. Pathology reveals non-invasive papillary urothelial carcinoma, high grade.     No new bothersome urinary symptoms. Denies gross hematuria, urgency, frequency, and incontinence. No f/c/n/v.      PMH includes lung cancer diagnosed in 2014 and treated w/ radiation. Questionable recurrence treated w/ re-radiation 2016. Monitored via regular CT-scans w/ no evidence of recurrence. H/o squamous cell skin cancer. Additionally, she has a h/o restrictive lung disease. Longtime smoking hx.    No incontience                  Past Medical History   Diagnosis Date    Arthritis         Knees and left shoulder    Cancer ([address] 1978       squamous cell CA skin    High cholesterol      Hypertension      Lung cancer ([address] 4/9/2014    Mass of bladder      Pulmonary nodule 1/4/14    Radiation      Thyroid disease 2001                Past Surgical History   Procedure Laterality Date    Hx gyn           hysterectomy    Hx other surgical           skin cancer removed.  Hx appendectomy        Hx colonoscopy   2002    Hx cyst removal Left         Breast    Hx tubal ligation        Pr chest surgery procedure unlisted           Radiosurgery                 Social History   Substance Use Topics    Smoking status: Former Smoker       Packs/day: 0.50       Years: 40.00       Quit date: 2/7/2007    Smokeless tobacco: Never Used    Alcohol use 14.4 oz/week        24 Cans of beer per week          Comment: none since 12/16         No Known Allergies     Family History   Problem Relation Age of Onset    Heart Attack Sister                  Current Outpatient Prescriptions   Medication Sig Dispense Refill    oxyCODONE-acetaminophen (PERCOCET) 5-325 mg per tablet Take 1 Tab by mouth every four (4) hours as needed for Pain. Max Daily Amount: 6 Tabs. 10 Tab 0    docusate sodium (COLACE) 100 mg capsule Take 1 Cap by mouth two (2) times a day for 90 days. 30 Cap 0    potassium chloride (KAON 20%) 40 mEq/15 mL liqd Take 60 mEq by mouth daily.  Indications: HYPOKALEMIA        mometasone-formoterol (DULERA) 100-5 mcg/actuation HFA inhaler Take 1 Puff by inhalation daily.        CALCIUM CARBONATE (CALCIUM 600 PO) Take  by mouth.        cholecalciferol (VITAMIN D3) 1,000 unit tablet Take  by mouth daily.        meloxicam (MOBIC) 15 mg tablet Take 15 mg by mouth daily.        ibandronate (BONIVA) 150 mg tablet Take 150 mg by mouth every thirty (30) days.        amLODIPine (NORVASC) 5 mg tablet Take 5 mg by mouth daily.        hydrochlorothiazide (HYDRODIURIL) 25 mg tablet Take 25 mg by mouth daily.             Review of Systems  Constitutional: Fever: No  Skin: Rash: No  HEENT: Hearing difficulty: No  Eyes: Blurred vision: No  Cardiovascular: Chest pain: No  Respiratory: Shortness of breath: No  Gastrointestinal: Nausea/vomiting: No  Musculoskeletal: Back pain: No  Neurological: Weakness: No  Psychological: Memory loss: No  Comments/additional findings:         PHYSICAL EXAMINATION:           Visit Vitals    /72    Ht 5' 1\" (1.549 m)    Wt 127 lb (57.6 kg)    BMI 24 kg/m2   Constitutional: Well developed, well-nourished female in no acute distress. CV:  No peripheral swelling noted  Respiratory: No respiratory distress or difficulties  Abdomen:  Soft and nontender. No masses. No hepatosplenomegaly.  Female:  No CVA tenderness. Skin:  Normal color. No evidence of jaundice. Neuro/Psych:  Patient with appropriate affect. Alert and oriented. Lymphatic:   No enlargement of supraclavicular lymph nodes.        REVIEW OF LABS:  None.         Surgical Pathology 1/24/17      FINAL DIAGNOSIS:   BLADDER TUMOR:   NON-INVASIVE PAPILLARY UROTHELIAL CARCINOMA, HIGH GRADE.         CT A/P 11/16/16  Impression  1. New 1cm mass within the left aspect of the urinary bladder concerning for transitional cell carcinoma  2. No discrete evidence of pulmonary metastases  3. Diverticulosis without diverticulitis      CT w/o 5/18/16     IMPRESSION:        1.  Limited study secondary to the lack of contrast as well as motion and beam hardening artifact.  Small fat-containing left inguinal hernia with subtle protrusion of a loop of small bowel into the neck of the hernia without bowel wall thickening or other inflammatory changes.  However, there is also a small amount of simple fluid more distally within the left angle hernia.  This may correspond to the area of swelling and possible pain described clinically.    2.  Chronic appearing induration along the posterior midline soft tissues at the distal sacrococcygeal level suggesting chronic inflammation.  There is associated reactive sclerosis of the coccyx as well as to a lesser extent the most distal portion of the sacrum which may represent remodeling from chronic irritation versus chronic osteomyelitis.  No acute osseous abnormality. 3.  Severe spondylosis including dextroconvex scoliosis as well as levels of listhesis and foraminal/canal encroachment. 4.  Calcified fibroid uterus. 5.  Evidence of prior granulomatous exposure. 6.  Multiple bilateral cystic lesions, left larger than right including several mildly complex cyst with calcified septations in the lower pole the left kidney.  Consider further assessment with followup ultrasound. 7.  Mild anasarca. 8.  Howe catheter in place within a decompressed and thickwalled bladder.  Recommend correlation for cystitis.    9.  Small pleural effusions, left greater than right.               A copy of today's office visit with all pertinent imaging results and labs were sent to the referring physician.    Mikel Hernandez MD  Urology of [address] [place] [address] Dr Marti Jacobs [facility], [address] [facility]  Phone: [phone]  Pager: [phone]

## 2017-07-03 NOTE — ANESTHESIA PREPROCEDURE EVALUATION
Anesthetic History   No history of anesthetic complications            Review of Systems / Medical History  Patient summary reviewed and pertinent labs reviewed    Pulmonary    COPD               Neuro/Psych   Within defined limits           Cardiovascular    Hypertension              Exercise tolerance: >4 METS     GI/Hepatic/Renal  Within defined limits              Endo/Other      Hypothyroidism       Other Findings   Comments:   Risk Factors for Postoperative nausea/vomiting:       History of postoperative nausea/vomiting? NO       Female? YES       Motion sickness? NO       Intended opioid administration for postoperative analgesia? NO      Smoking Abstinence  Current Smoker? NO  Elective Surgery? YES  Seen preoperatively by anesthesiologist or proxy prior to day of surgery? YES  Pt abstained from smoking 24 hours prior to anesthesia?  N/A         Physical Exam    Airway  Mallampati: II  TM Distance: 4 - 6 cm  Neck ROM: normal range of motion   Mouth opening: Normal     Cardiovascular  Regular rate and rhythm,  S1 and S2 normal,  no murmur, click, rub, or gallop  Rhythm: regular  Rate: normal         Dental    Dentition: Poor dentition and Lower partial plate  Comments: No upper teeth   Pulmonary  Breath sounds clear to auscultation               Abdominal  GI exam deferred       Other Findings            Anesthetic Plan    ASA: 3  Anesthesia type: general          Induction: Intravenous  Anesthetic plan and risks discussed with: Patient

## 2017-07-03 NOTE — BRIEF OP NOTE
BRIEF OPERATIVE NOTE    Date of Procedure: 7/3/2017   Preoperative Diagnosis: malignant neoplasm of urinary bladder unspecified site hcc microscopic [id number] urine leukocytes c67.9 r31.29 r82.99  Postoperative Diagnosis: malignant neoplasm of urinary bladder unspecified site hcc microscopic hematuria urine leukocytes c67.9 r31.29 r82.99    Procedure(s):  cystoscopy,, TRANSURETHRAL RESECTION OF BLADDER TUMOR,FULGURATION  Surgeon(s) and Role:     * Delvis Tatum MD - Primary         Assistant Staff:       Surgical Staff:  Circ-1: Karon Olivares RN  Radiology Technician: Jennifer Lewis  Scrub Tech-1: Ej Ortiz  Event Time In   Incision Start  1:15 PM   Incision Close  1:40 PM     Anesthesia: General   Estimated Blood Loss: minimal  Specimens:   ID Type Source Tests Collected by Time Destination   1 : BLADDER TUMOR DOME Preservative Bladder  Delvis Tatum MD 7/3/2017  1:33 PM Pathology   2 : [address] Joseph MD 7/3/2017  1:34 PM Pathology   3 : ERYTHEMA RIGHT LATERAL WALL Preservative Bladder  Delvis Tatum MD 7/3/2017  1:34 PM Pathology   4 : ERYTHEMA LEFT LATERAL WALL Preservative Bladder  Delvis Tatum MD 7/3/2017  1:35 PM Pathology   5 : ERYTHEMA POSTERIOR BLADDER WALL Preservative Bladder  Delvis Tatum MD 7/3/2017  1:35 PM Pathology      Findings: see dictation   Complications: none  Implants: * No implants in log *     Number of tumors:           1, diffuse  Size of largest tumor:                  1 cm      Characteristics of tumors:     Papillary  y   Sessile  y       Nodular   n       Flat y    Recurrent   y           Primary   n    Suspicious for Carcinoma in situ:    y    Clinical tumor stage:           cTa        Bimanual exam under anesthesia:        y    Visually complete resection:                 y    Visualization of detrusor muscle in resection base:        y    Visual evaluation for perforation:             n

## 2017-07-18 PROBLEM — C68.9 UROTHELIAL CARCINOMA (HCC): Status: ACTIVE | Noted: 2017-07-18

## 2017-07-18 PROBLEM — R33.9 INCOMPLETE BLADDER EMPTYING: Status: ACTIVE | Noted: 2017-07-18

## 2017-10-09 ENCOUNTER — HOSPITAL ENCOUNTER (OUTPATIENT)
Dept: CT IMAGING | Age: 72
Discharge: HOME | End: 2017-10-09
Attending: INTERNAL MEDICINE
Payer: MEDICARE

## 2017-10-09 DIAGNOSIS — C34.31 PRIMARY MALIGNANT NEOPLASM OF BRONCHUS OF RIGHT LOWER LOBE (HCC): ICD-10-CM

## 2017-10-09 LAB — CREAT UR-MCNC: 1.3 MG/DL (ref 0.6–1.3)

## 2017-10-09 PROCEDURE — 82565 ASSAY OF CREATININE: CPT

## 2017-10-09 PROCEDURE — 74011636320 HC RX REV CODE- 636/320: Performed by: INTERNAL MEDICINE

## 2017-10-09 PROCEDURE — 71260 CT THORAX DX C+: CPT

## 2017-10-09 RX ADMIN — IOPAMIDOL 100 ML: 612 INJECTION, SOLUTION INTRAVENOUS at 17:21

## 2017-12-07 ENCOUNTER — HOSPITAL ENCOUNTER (OUTPATIENT)
Dept: GENERAL RADIOLOGY | Age: 72
Discharge: HOME | End: 2017-12-07
Attending: INTERNAL MEDICINE
Payer: MEDICARE

## 2017-12-07 DIAGNOSIS — E04.2 NONTOXIC MULTINODULAR GOITER: ICD-10-CM

## 2017-12-07 PROCEDURE — 74011000250 HC RX REV CODE- 250

## 2017-12-07 PROCEDURE — 74220 X-RAY XM ESOPHAGUS 1CNTRST: CPT

## 2017-12-07 PROCEDURE — 74011000255 HC RX REV CODE- 255

## 2017-12-07 RX ADMIN — BARIUM SULFATE 176 G: 960 POWDER, FOR SUSPENSION ORAL at 11:58

## 2017-12-07 RX ADMIN — BARIUM SULFATE 135 ML: 980 POWDER, FOR SUSPENSION ORAL at 11:59

## 2017-12-07 RX ADMIN — BARIUM SULFATE 700 MG: 700 TABLET ORAL at 11:58

## 2017-12-07 RX ADMIN — ANTACID/ANTIFLATULENT 4 G: 380; 550; 10; 10 GRANULE, EFFERVESCENT ORAL at 11:59

## 2017-12-21 PROBLEM — C67.9 MALIGNANT NEOPLASM OF URINARY BLADDER (HCC): Status: ACTIVE | Noted: 2017-12-21

## 2018-01-26 ENCOUNTER — HOSPITAL ENCOUNTER (OUTPATIENT)
Dept: PET IMAGING | Age: 73
Discharge: HOME | End: 2018-01-26
Attending: INTERNAL MEDICINE
Payer: MEDICARE

## 2018-01-26 DIAGNOSIS — C34.31 PRIMARY MALIGNANT NEOPLASM OF BRONCHUS OF RIGHT LOWER LOBE (HCC): ICD-10-CM

## 2018-01-26 PROCEDURE — A9552 F18 FDG: HCPCS

## 2018-05-02 ENCOUNTER — HOSPITAL ENCOUNTER (OUTPATIENT)
Dept: CT IMAGING | Age: 73
Discharge: HOME | End: 2018-05-02
Attending: INTERNAL MEDICINE
Payer: MEDICARE

## 2018-05-02 DIAGNOSIS — C34.31 PRIMARY MALIGNANT NEOPLASM OF BRONCHUS OF RIGHT LOWER LOBE (HCC): ICD-10-CM

## 2018-05-02 LAB — CREAT UR-MCNC: 1.3 MG/DL (ref 0.6–1.3)

## 2018-05-02 PROCEDURE — 82565 ASSAY OF CREATININE: CPT

## 2018-05-02 PROCEDURE — 71260 CT THORAX DX C+: CPT

## 2018-05-02 PROCEDURE — 74011636320 HC RX REV CODE- 636/320: Performed by: INTERNAL MEDICINE

## 2018-05-02 RX ADMIN — IOPAMIDOL 60 ML: 612 INJECTION, SOLUTION INTRAVENOUS at 16:00

## 2018-06-01 PROBLEM — E78.5 HYPERLIPIDEMIA: Status: ACTIVE | Noted: 2018-06-01

## 2018-06-01 PROBLEM — C44.90 MALIGNANT NEOPLASM OF SKIN: Status: ACTIVE | Noted: 2018-06-01

## 2018-06-01 PROBLEM — M19.90 ARTHRITIS: Status: ACTIVE | Noted: 2018-06-01

## 2018-06-01 PROBLEM — E03.9 HYPOTHYROID: Status: ACTIVE | Noted: 2018-06-01

## 2018-06-01 PROBLEM — K22.4 ESOPHAGEAL DYSMOTILITY: Status: ACTIVE | Noted: 2017-12-14

## 2018-06-01 PROBLEM — M81.0 OSTEOPOROSIS: Status: ACTIVE | Noted: 2018-06-01

## 2018-06-29 ENCOUNTER — HOSPITAL ENCOUNTER (OUTPATIENT)
Dept: PREADMISSION TESTING | Age: 73
Discharge: HOME | End: 2018-06-29
Payer: MEDICARE

## 2018-06-29 ENCOUNTER — HOSPITAL ENCOUNTER (OUTPATIENT)
Dept: GENERAL RADIOLOGY | Age: 73
Discharge: HOME | End: 2018-06-29
Payer: MEDICARE

## 2018-06-29 DIAGNOSIS — Z01.818 PRE-OP TESTING: ICD-10-CM

## 2018-06-29 LAB
ANION GAP SERPL CALC-SCNC: 8 MMOL/L (ref 3–18)
APTT PPP: 27.2 SEC (ref 23–36.4)
BUN SERPL-MCNC: 23 MG/DL (ref 7–18)
BUN/CREAT SERPL: 17 (ref 12–20)
CALCIUM SERPL-MCNC: 9.5 MG/DL (ref 8.5–10.1)
CHLORIDE SERPL-SCNC: 104 MMOL/L (ref 100–108)
CO2 SERPL-SCNC: 32 MMOL/L (ref 21–32)
CREAT SERPL-MCNC: 1.37 MG/DL (ref 0.6–1.3)
ERYTHROCYTE [DISTWIDTH] IN BLOOD BY AUTOMATED COUNT: 13.9 % (ref 11.6–14.5)
GLUCOSE SERPL-MCNC: 86 MG/DL (ref 74–99)
HCT VFR BLD AUTO: 44.9 % (ref 35–45)
HGB BLD-MCNC: 14.8 G/DL (ref 12–16)
INR PPP: 0.9 (ref 0.8–1.2)
MCH RBC QN AUTO: 30.5 PG (ref 24–34)
MCHC RBC AUTO-ENTMCNC: 33 G/DL (ref 31–37)
MCV RBC AUTO: 92.6 FL (ref 74–97)
PLATELET # BLD AUTO: 261 K/UL (ref 135–420)
PMV BLD AUTO: 10.3 FL (ref 9.2–11.8)
POTASSIUM SERPL-SCNC: 3.8 MMOL/L (ref 3.5–5.5)
PROTHROMBIN TIME: 11.9 SEC (ref 11.5–15.2)
RBC # BLD AUTO: 4.85 M/UL (ref 4.2–5.3)
SODIUM SERPL-SCNC: 144 MMOL/L (ref 136–145)
WBC # BLD AUTO: 7.3 K/UL (ref 4.6–13.2)

## 2018-06-29 PROCEDURE — 85730 THROMBOPLASTIN TIME PARTIAL: CPT | Performed by: UROLOGY

## 2018-06-29 PROCEDURE — 80048 BASIC METABOLIC PNL TOTAL CA: CPT | Performed by: UROLOGY

## 2018-06-29 PROCEDURE — 36415 COLL VENOUS BLD VENIPUNCTURE: CPT | Performed by: UROLOGY

## 2018-06-29 PROCEDURE — 85027 COMPLETE CBC AUTOMATED: CPT | Performed by: UROLOGY

## 2018-06-29 PROCEDURE — 87086 URINE CULTURE/COLONY COUNT: CPT | Performed by: UROLOGY

## 2018-06-29 PROCEDURE — 85610 PROTHROMBIN TIME: CPT | Performed by: UROLOGY

## 2018-06-29 PROCEDURE — 93005 ELECTROCARDIOGRAM TRACING: CPT

## 2018-06-29 PROCEDURE — 71046 X-RAY EXAM CHEST 2 VIEWS: CPT

## 2018-06-30 LAB
ATRIAL RATE: 101 BPM
CALCULATED P AXIS, ECG09: 46 DEGREES
CALCULATED R AXIS, ECG10: 44 DEGREES
CALCULATED T AXIS, ECG11: 147 DEGREES
DIAGNOSIS, 93000: NORMAL
P-R INTERVAL, ECG05: 122 MS
Q-T INTERVAL, ECG07: 348 MS
QRS DURATION, ECG06: 80 MS
QTC CALCULATION (BEZET), ECG08: 451 MS
VENTRICULAR RATE, ECG03: 101 BPM

## 2018-07-01 LAB
BACTERIA SPEC CULT: NORMAL
SERVICE CMNT-IMP: NORMAL

## 2018-07-10 ENCOUNTER — ANESTHESIA EVENT (OUTPATIENT)
Dept: SURGERY | Age: 73
End: 2018-07-10
Payer: MEDICARE

## 2018-07-10 RX ORDER — ALBUTEROL SULFATE 90 UG/1
AEROSOL, METERED RESPIRATORY (INHALATION)
Refills: 0 | COMMUNITY
Start: 2018-04-03 | End: 2022-06-15

## 2018-07-10 RX ORDER — ATORVASTATIN CALCIUM 80 MG/1
TABLET, FILM COATED ORAL
Refills: 1 | COMMUNITY
Start: 2018-04-03 | End: 2018-07-31 | Stop reason: SDUPTHER

## 2018-07-10 NOTE — PERIOP NOTES
PAT - SURGICAL PRE-ADMISSION INSTRUCTIONS    NAME:  Reji Alarcon                                                          TODAY'S DATE:  7/10/2018    SURGERY DATE:  7/12/2018                                  SURGERY ARRIVAL TIME:   1100    1. Do NOT eat or drink anything, including candy or gum, after MIDNIGHT on 07/11/2018 , unless you have specific instructions from your Surgeon or Anesthesia Provider to do so. 2. No smoking on the day of surgery. 3. No alcohol 24 hours prior to the day of surgery. 4. No recreational drugs for one week prior to the day of surgery. 5. Leave all valuables, including money/purse, at home. 6. Remove all jewelry, nail polish, makeup (including mascara); no lotions, powders, deodorant, or perfume/cologne/after shave. 7. Glasses/Contact lenses and Dentures may be worn to the hospital.  They will be removed prior to surgery. 8. Call your doctor if symptoms of a cold or illness develop within 24 ours prior to surgery. 9. AN ADULT MUST DRIVE YOU HOME AFTER OUTPATIENT SURGERY. 10. If you are having an OUTPATIENT procedure, please make arrangements for a responsible adult to be with you for 24 hours after your surgery. 11. If you are admitted to the hospital, you will be assigned to a bed after surgery is complete. Normally a family member will not be able to see you until you are in your assigned bed. 15. Family is encouraged to accompany you to the hospital.  We ask visitors in the treatment area to be limited to ONE person at a time to ensure patient privacy. EXCEPTIONS WILL BE MADE AS NEEDED. 15. Children under 12 are discouraged from entering the treatment area and need to be supervised by an adult when in the waiting room. Special Instructions: Take these medications the morning of surgery with a sip of water:  Amlodipine, Bring any pertinent legal medical records.     Patient Prep:    shower with anti-bacterial soap    These surgical instructions were reviewed with Nati Corona during the PAT phone call. Directions: On the morning of surgery, please go to the [facility]. Enter the building from the [facility] entrance, 1st floor (next to the Emergency Room entrance). Take the elevator to the 2nd floor. Sign in at the Registration Desk.     If you have any questions and/or concerns, please do not hesitate to call:  (Prior to the day of surgery)  [facility] unit:  [phone]  (Day of surgery)  [facility] unit:  [phone]

## 2018-07-12 ENCOUNTER — APPOINTMENT (OUTPATIENT)
Dept: GENERAL RADIOLOGY | Age: 73
End: 2018-07-12
Attending: UROLOGY
Payer: MEDICARE

## 2018-07-12 ENCOUNTER — HOSPITAL ENCOUNTER (OUTPATIENT)
Age: 73
Setting detail: OUTPATIENT SURGERY
Discharge: HOME | End: 2018-07-12
Attending: UROLOGY | Admitting: UROLOGY
Payer: MEDICARE

## 2018-07-12 ENCOUNTER — ANESTHESIA (OUTPATIENT)
Dept: SURGERY | Age: 73
End: 2018-07-12
Payer: MEDICARE

## 2018-07-12 VITALS
BODY MASS INDEX: 24.07 KG/M2 | RESPIRATION RATE: 13 BRPM | DIASTOLIC BLOOD PRESSURE: 83 MMHG | SYSTOLIC BLOOD PRESSURE: 154 MMHG | WEIGHT: 127.5 LBS | OXYGEN SATURATION: 98 % | HEIGHT: 61 IN | TEMPERATURE: 97.7 F | HEART RATE: 93 BPM

## 2018-07-12 PROCEDURE — 76210000006 HC OR PH I REC 0.5 TO 1 HR: Performed by: UROLOGY

## 2018-07-12 PROCEDURE — 74420 UROGRAPHY RTRGR +-KUB: CPT

## 2018-07-12 PROCEDURE — 76010000138 HC OR TIME 0.5 TO 1 HR: Performed by: UROLOGY

## 2018-07-12 PROCEDURE — 74011250637 HC RX REV CODE- 250/637: Performed by: NURSE ANESTHETIST, CERTIFIED REGISTERED

## 2018-07-12 PROCEDURE — 77030021163 HC TUBE CYSTO IRR ICUM -A: Performed by: UROLOGY

## 2018-07-12 PROCEDURE — 74011250636 HC RX REV CODE- 250/636

## 2018-07-12 PROCEDURE — 76210000026 HC REC RM PH II 1 TO 1.5 HR: Performed by: UROLOGY

## 2018-07-12 PROCEDURE — C1758 CATHETER, URETERAL: HCPCS | Performed by: UROLOGY

## 2018-07-12 PROCEDURE — 74011250636 HC RX REV CODE- 250/636: Performed by: UROLOGY

## 2018-07-12 PROCEDURE — 77030032490 HC SLV COMPR SCD KNE COVD -B: Performed by: UROLOGY

## 2018-07-12 PROCEDURE — 77030018836 HC SOL IRR NACL ICUM -A: Performed by: UROLOGY

## 2018-07-12 PROCEDURE — 88305 TISSUE EXAM BY PATHOLOGIST: CPT | Performed by: UROLOGY

## 2018-07-12 PROCEDURE — 77030012510 HC MSK AIRWY LMA TELE -B: Performed by: NURSE ANESTHETIST, CERTIFIED REGISTERED

## 2018-07-12 PROCEDURE — 74011250636 HC RX REV CODE- 250/636: Performed by: NURSE ANESTHETIST, CERTIFIED REGISTERED

## 2018-07-12 PROCEDURE — 74011636320 HC RX REV CODE- 636/320: Performed by: UROLOGY

## 2018-07-12 PROCEDURE — 74011000250 HC RX REV CODE- 250

## 2018-07-12 PROCEDURE — 76060000032 HC ANESTHESIA 0.5 TO 1 HR: Performed by: UROLOGY

## 2018-07-12 RX ORDER — DEXTROSE MONOHYDRATE 25 G/50ML
25-50 INJECTION, SOLUTION INTRAVENOUS AS NEEDED
Status: DISCONTINUED | OUTPATIENT
Start: 2018-07-12 | End: 2018-07-12 | Stop reason: HOSPADM

## 2018-07-12 RX ORDER — PROPOFOL 10 MG/ML
INJECTION, EMULSION INTRAVENOUS AS NEEDED
Status: DISCONTINUED | OUTPATIENT
Start: 2018-07-12 | End: 2018-07-12 | Stop reason: HOSPADM

## 2018-07-12 RX ORDER — GENTAMICIN SULFATE 80 MG/100ML
80 INJECTION, SOLUTION INTRAVENOUS
Status: COMPLETED | OUTPATIENT
Start: 2018-07-12 | End: 2018-07-12

## 2018-07-12 RX ORDER — HYDROMORPHONE HYDROCHLORIDE 2 MG/ML
0.2 INJECTION, SOLUTION INTRAMUSCULAR; INTRAVENOUS; SUBCUTANEOUS AS NEEDED
Status: DISCONTINUED | OUTPATIENT
Start: 2018-07-12 | End: 2018-07-12 | Stop reason: HOSPADM

## 2018-07-12 RX ORDER — INSULIN LISPRO 100 [IU]/ML
INJECTION, SOLUTION INTRAVENOUS; SUBCUTANEOUS ONCE
Status: DISCONTINUED | OUTPATIENT
Start: 2018-07-12 | End: 2018-07-12 | Stop reason: HOSPADM

## 2018-07-12 RX ORDER — FENTANYL CITRATE 50 UG/ML
INJECTION, SOLUTION INTRAMUSCULAR; INTRAVENOUS AS NEEDED
Status: DISCONTINUED | OUTPATIENT
Start: 2018-07-12 | End: 2018-07-12 | Stop reason: HOSPADM

## 2018-07-12 RX ORDER — NITROFURANTOIN (MACROCRYSTALS) 100 MG/1
CAPSULE ORAL 2 TIMES DAILY
COMMUNITY
End: 2018-07-12

## 2018-07-12 RX ORDER — OXYCODONE AND ACETAMINOPHEN 5; 325 MG/1; MG/1
1 TABLET ORAL AS NEEDED
Status: DISCONTINUED | OUTPATIENT
Start: 2018-07-12 | End: 2018-07-12 | Stop reason: HOSPADM

## 2018-07-12 RX ORDER — OXYCODONE AND ACETAMINOPHEN 5; 325 MG/1; MG/1
1-2 TABLET ORAL
Qty: 30 TAB | Refills: 0 | Status: SHIPPED | OUTPATIENT
Start: 2018-07-12 | End: 2018-07-31

## 2018-07-12 RX ORDER — DEXAMETHASONE SODIUM PHOSPHATE 4 MG/ML
INJECTION, SOLUTION INTRA-ARTICULAR; INTRALESIONAL; INTRAMUSCULAR; INTRAVENOUS; SOFT TISSUE AS NEEDED
Status: DISCONTINUED | OUTPATIENT
Start: 2018-07-12 | End: 2018-07-12 | Stop reason: HOSPADM

## 2018-07-12 RX ORDER — LIDOCAINE HYDROCHLORIDE 20 MG/ML
INJECTION, SOLUTION EPIDURAL; INFILTRATION; INTRACAUDAL; PERINEURAL AS NEEDED
Status: DISCONTINUED | OUTPATIENT
Start: 2018-07-12 | End: 2018-07-12 | Stop reason: HOSPADM

## 2018-07-12 RX ORDER — SODIUM CHLORIDE, SODIUM LACTATE, POTASSIUM CHLORIDE, CALCIUM CHLORIDE 600; 310; 30; 20 MG/100ML; MG/100ML; MG/100ML; MG/100ML
25 INJECTION, SOLUTION INTRAVENOUS CONTINUOUS
Status: DISCONTINUED | OUTPATIENT
Start: 2018-07-12 | End: 2018-07-12 | Stop reason: HOSPADM

## 2018-07-12 RX ORDER — SODIUM CHLORIDE, SODIUM LACTATE, POTASSIUM CHLORIDE, CALCIUM CHLORIDE 600; 310; 30; 20 MG/100ML; MG/100ML; MG/100ML; MG/100ML
100 INJECTION, SOLUTION INTRAVENOUS CONTINUOUS
Status: DISCONTINUED | OUTPATIENT
Start: 2018-07-12 | End: 2018-07-12 | Stop reason: HOSPADM

## 2018-07-12 RX ORDER — CIPROFLOXACIN 500 MG/1
500 TABLET ORAL 2 TIMES DAILY
Qty: 10 TAB | Refills: 0 | Status: SHIPPED | OUTPATIENT
Start: 2018-07-12 | End: 2018-07-31

## 2018-07-12 RX ORDER — HYDROMORPHONE HYDROCHLORIDE 2 MG/ML
0.5 INJECTION, SOLUTION INTRAMUSCULAR; INTRAVENOUS; SUBCUTANEOUS
Status: DISCONTINUED | OUTPATIENT
Start: 2018-07-12 | End: 2018-07-12 | Stop reason: HOSPADM

## 2018-07-12 RX ORDER — MIDAZOLAM HYDROCHLORIDE 1 MG/ML
INJECTION, SOLUTION INTRAMUSCULAR; INTRAVENOUS AS NEEDED
Status: DISCONTINUED | OUTPATIENT
Start: 2018-07-12 | End: 2018-07-12 | Stop reason: HOSPADM

## 2018-07-12 RX ORDER — CIPROFLOXACIN 500 MG/1
500 TABLET ORAL 2 TIMES DAILY
Qty: 10 TAB | Refills: 0 | Status: SHIPPED | OUTPATIENT
Start: 2018-07-12 | End: 2018-07-17

## 2018-07-12 RX ORDER — HYDROMORPHONE HYDROCHLORIDE 1 MG/ML
INJECTION, SOLUTION INTRAMUSCULAR; INTRAVENOUS; SUBCUTANEOUS
Status: COMPLETED
Start: 2018-07-12 | End: 2018-07-12

## 2018-07-12 RX ORDER — FAMOTIDINE 20 MG/1
20 TABLET, FILM COATED ORAL ONCE
Status: COMPLETED | OUTPATIENT
Start: 2018-07-12 | End: 2018-07-12

## 2018-07-12 RX ORDER — DIPHENHYDRAMINE HYDROCHLORIDE 50 MG/ML
12.5 INJECTION, SOLUTION INTRAMUSCULAR; INTRAVENOUS
Status: DISCONTINUED | OUTPATIENT
Start: 2018-07-12 | End: 2018-07-12 | Stop reason: HOSPADM

## 2018-07-12 RX ORDER — SODIUM CHLORIDE 0.9 % (FLUSH) 0.9 %
5-10 SYRINGE (ML) INJECTION AS NEEDED
Status: DISCONTINUED | OUTPATIENT
Start: 2018-07-12 | End: 2018-07-12 | Stop reason: HOSPADM

## 2018-07-12 RX ORDER — DOCUSATE SODIUM 100 MG/1
100 CAPSULE, LIQUID FILLED ORAL
Qty: 60 CAP | Refills: 2 | Status: SHIPPED | OUTPATIENT
Start: 2018-07-12 | End: 2018-10-10

## 2018-07-12 RX ORDER — MAGNESIUM SULFATE 100 %
4 CRYSTALS MISCELLANEOUS AS NEEDED
Status: DISCONTINUED | OUTPATIENT
Start: 2018-07-12 | End: 2018-07-12 | Stop reason: HOSPADM

## 2018-07-12 RX ADMIN — DEXAMETHASONE SODIUM PHOSPHATE 4 MG: 4 INJECTION, SOLUTION INTRA-ARTICULAR; INTRALESIONAL; INTRAMUSCULAR; INTRAVENOUS; SOFT TISSUE at 14:39

## 2018-07-12 RX ADMIN — SODIUM CHLORIDE, SODIUM LACTATE, POTASSIUM CHLORIDE, AND CALCIUM CHLORIDE 25 ML/HR: 600; 310; 30; 20 INJECTION, SOLUTION INTRAVENOUS at 12:04

## 2018-07-12 RX ADMIN — MIDAZOLAM HYDROCHLORIDE 1 MG: 1 INJECTION, SOLUTION INTRAMUSCULAR; INTRAVENOUS at 14:29

## 2018-07-12 RX ADMIN — PROPOFOL 120 MG: 10 INJECTION, EMULSION INTRAVENOUS at 14:34

## 2018-07-12 RX ADMIN — LIDOCAINE HYDROCHLORIDE 80 MG: 20 INJECTION, SOLUTION EPIDURAL; INFILTRATION; INTRACAUDAL; PERINEURAL at 14:34

## 2018-07-12 RX ADMIN — PROPOFOL 50 MG: 10 INJECTION, EMULSION INTRAVENOUS at 15:06

## 2018-07-12 RX ADMIN — FENTANYL CITRATE 25 MCG: 50 INJECTION, SOLUTION INTRAMUSCULAR; INTRAVENOUS at 14:45

## 2018-07-12 RX ADMIN — GENTAMICIN SULFATE 80 MG: 80 INJECTION, SOLUTION INTRAVENOUS at 14:33

## 2018-07-12 RX ADMIN — FENTANYL CITRATE 25 MCG: 50 INJECTION, SOLUTION INTRAMUSCULAR; INTRAVENOUS at 15:06

## 2018-07-12 RX ADMIN — FAMOTIDINE 20 MG: 20 TABLET ORAL at 11:53

## 2018-07-12 RX ADMIN — FENTANYL CITRATE 50 MCG: 50 INJECTION, SOLUTION INTRAMUSCULAR; INTRAVENOUS at 14:38

## 2018-07-12 RX ADMIN — HYDROMORPHONE HYDROCHLORIDE 0.2 MG: 1 INJECTION, SOLUTION INTRAMUSCULAR; INTRAVENOUS; SUBCUTANEOUS at 15:52

## 2018-07-12 NOTE — DISCHARGE INSTRUCTIONS
Cystoscopy: What to Expect at [address] [facility]    A cystoscopy is a procedure that lets a doctor look inside of the bladder and the urethra. The urethra is the tube that carries urine from the bladder to outside the body. The doctor uses a thin, lighted tool called a cystoscope. Your bladder is filled with fluid. This stretches the bladder so that your doctor can look closely at the inside of your bladder. After the cystoscopy, your urethra may be sore at first, and it may burn when you urinate for the first few days after the procedure. You may feel the need to urinate more often, and your urine may be pink. These symptoms should get better in 1 or 2 days. You will probably be able to go back to most of your usual activities in 1 or 2 days. This care sheet gives you a general idea about how long it will take for you to recover. But each person recovers at a different pace. Follow the steps below to get better as quickly as possible. How can you care for yourself at home? Activity    · Rest when you feel tired. Getting enough sleep will help you recover.     · Try to walk each day. Start by walking a little more than you did the day before. Bit by bit, increase the amount you walk. Walking boosts blood flow and helps prevent pneumonia and constipation.     · Avoid strenuous activities, such as bicycle riding, jogging, weight lifting, or aerobic exercise, until your doctor says it is okay.     · Ask your doctor when you can drive again.     · Most people are able to return to work within 1 or 2 days after the procedure.     · You may shower and take baths as usual.     · Ask your doctor when it is okay for you to have sex. Diet    · You can eat your normal diet. If your stomach is upset, try bland, low-fat foods like plain rice, broiled chicken, toast, and yogurt.     · Drink plenty of fluids (unless your doctor tells you not to). Medicines    · Take pain medicines exactly as directed.   ¨ If the doctor gave you a prescription medicine for pain, take it as prescribed. ¨ If you are not taking a prescription pain medicine, ask your doctor if you can take an over-the-counter medicine.     · If you think your pain medicine is making you sick to your stomach:  ¨ Take your medicine after meals (unless your doctor has told you not to). ¨ Ask your doctor for a different pain medicine.     · If your doctor prescribed antibiotics, take them as directed. Do not stop taking them just because you feel better. You need to take the full course of antibiotics. Follow-up care is a key part of your treatment and safety. Be sure to make and go to all appointments, and call your doctor if you are having problems. It's also a good idea to know your test results and keep a list of the medicines you take. When should you call for help? Call 911 anytime you think you may need emergency care. For example, call if:    · You passed out (lost consciousness).     · You have severe trouble breathing.     · You have sudden chest pain and shortness of breath, or you cough up blood.     · You have severe belly pain.    Call your doctor now or seek immediate medical care if:    · You are sick to your stomach or cannot keep fluids down.     · Your urine is still red or you see blood clots after you have urinated several times.     · You have trouble passing urine or stool, especially if you have pain or swelling in your lower belly.     · You have signs of a blood clot, such as:  ¨ Pain in your calf, back of the knee, thigh, or groin. ¨ Redness and swelling in your leg or groin.     · You develop a fever or severe chills.     · You have pain in your back just below your rib cage. This is called flank pain.    Watch closely for changes in your health, and be sure to contact your doctor if:    · You have pain or burning when you urinate.  A burning feeling is normal for a day or two after the test, but call if it does not get better.     · You have a frequent urge to urinate but can pass only small amounts of urine.     · Your urine is pink, red, or cloudy, or smells bad. It is normal for the urine to have a pinkish color for a few days after the test, but call if it does not get better. Where can you learn more? Go to http://gabby-hiro.info/. Enter D043 in the search box to learn more about \"Cystoscopy: What to Expect at Home. \"  Current as of: May 12, 2017  Content Version: 11.7  © 8055-5181 [employer]. Care instructions adapted under license by [employer] (which disclaims liability or warranty for this information). If you have questions about a medical condition or this instruction, always ask your healthcare professional. [address] any warranty or liability for your use of this information. DISCHARGE SUMMARY from Nurse    PATIENT INSTRUCTIONS:    After general anesthesia or intravenous sedation, for 24 hours or while taking prescription Narcotics:  · Limit your activities  · Do not drive and operate hazardous machinery  · Do not make important personal or business decisions  · Do  not drink alcoholic beverages  · If you have not urinated within 8 hours after discharge, please contact your surgeon on call.     Report the following to your surgeon:  · Excessive pain, swelling, redness or odor of or around the surgical area  · Temperature over 100.5  · Nausea and vomiting lasting longer than 4 hours or if unable to take medications  · Any signs of decreased circulation or nerve impairment to extremity: change in color, persistent  numbness, tingling, coldness or increase pain  · Any questions    What to do at Home:  Recommended activity: Activity as tolerated and no driving for today    These are general instructions for a healthy lifestyle:    No smoking/ No tobacco products/ Avoid exposure to second hand smoke  Surgeon General's Warning:  Quitting smoking now greatly reduces serious risk to your health. Obesity, smoking, and sedentary lifestyle greatly increases your risk for illness    A healthy diet, regular physical exercise & weight monitoring are important for maintaining a healthy lifestyle    You may be retaining fluid if you have a history of heart failure or if you experience any of the following symptoms:  Weight gain of 3 pounds or more overnight or 5 pounds in a week, increased swelling in our hands or feet or shortness of breath while lying flat in bed. Please call your doctor as soon as you notice any of these symptoms; do not wait until your next office visit. Recognize signs and symptoms of STROKE:    F-face looks uneven    A-arms unable to move or move unevenly    S-speech slurred or non-existent    T-time-call 911 as soon as signs and symptoms begin-DO NOT go       Back to bed or wait to see if you get better-TIME IS BRAIN. Warning Signs of HEART ATTACK     Call 911 if you have these symptoms:   Chest discomfort. Most heart attacks involve discomfort in the center of the chest that lasts more than a few minutes, or that goes away and comes back. It can feel like uncomfortable pressure, squeezing, fullness, or pain.  Discomfort in other areas of the upper body. Symptoms can include pain or discomfort in one or both arms, the back, neck, jaw, or stomach.  Shortness of breath with or without chest discomfort.  Other signs may include breaking out in a cold sweat, nausea, or lightheadedness. Don't wait more than five minutes to call 911 - MINUTES MATTER! Fast action can save your life. Calling 911 is almost always the fastest way to get lifesaving treatment. Emergency Medical Services staff can begin treatment when they arrive -- up to an hour sooner than if someone gets to the hospital by car. The discharge information has been reviewed with the patient. The patient verbalized understanding.   Discharge medications reviewed with the patient and appropriate educational materials and side effects teaching were provided. Patient armband removed and given to patient to take home. Patient was informed of the privacy risks if armband lost or stolen.

## 2018-07-12 NOTE — PERIOP NOTES
1524  Assumed care of patient upon arrival to PACU. Patient alert, placed on monitor x3. VSS. [facility] to waiting area to update family. No one present at this time. 1628  Patient POC (daughter Alexa Hernandez) updated. In surgical waiting area.

## 2018-07-12 NOTE — H&P
Assessment:  Patient is a 74 y.o. female with HG [id number] papillary urothelial carcinoma of the bladder diagnosed on 1/23/17.      Recent Imaging: CT chest 10/9/17 showed Mild increased rounded contour and size of the more focal right lower lobe region which appears to correspond with prior mass concerning for tumor progression. PET/CT 1/26/18 showed moderately distended bladder shows no definite bladder wall abnormality, although, subtle pathology can be obscured by the intensive FDG urine uptake; the chronic right infrahilar consolidation with mild metabolic activity appears stable, likely chronic inflammation/scarring, although, potential residual treated tumor cannot be entirely excluded and warrants imaging surveillance; no FDG avid abnormality to suggest metastasis. Last Creatinine: 1.3 on 10/9/17. Last Cystoscopy: Today 6/1/18 revealed three ~5 mm red areas on the anterior, posterior and right bladder wall concerning for CIS.    First Tumor: 1/23/17  TURBT History: 1/23/17 positive for HG Ta papillary UCB; 7/3/17 positive for HG Ta UCB. Intravesicle Therapy: BCG induction 6/6 instillations completed 5/2/17; BCG re-induction 2/6 instillations completed 9/14/17; Half-strength BCG maintenance 3/3 instillations completed 1/9/18.      Current Disease Status: no evidence of recurrence. Current Treatment Plan: To OR for TURBT         Plan:  · Urine sent for culture and cytology today   · Rx for Macrobid sent to patient's pharmacy   · Cystoscopy today revealed three ~5 mm red areas on the anterior, posterior and right bladder wall concerning for CIS. · Continue CIC [id number] for incomplete bladder emptying   · To OR for TURBT      Melissa Dixon MD           No chief complaint on file. HISTORY OF PRESENT ILLNESS:  Kym Garcia is a 74 y.o. female who is seen for TURBT     No flowsheet data found.       Past Medical History:   Diagnosis Date    Arthritis     Knees and left shoulder    [facility]    squamous cell CA skin    High cholesterol     Hypertension     [facility] 4/9/2014    Malignant neoplasm of bladder ([facility] 01/23/2017    High grade TCC, s/p TURBT small +Mitomycin-C instillation -- Dr. Ninoska Diaz Mass of bladder     Pulmonary nodule 1/4/14    Radiation     Recurrent UTI     Thyroid disease 2001       Past Surgical History:   Procedure Laterality Date    CHEST SURGERY PROCEDURE UNLISTED      Radiosurgery    HX APPENDECTOMY      HX COLONOSCOPY  2002    HX CYST REMOVAL Left     Breast    HX GYN      hysterectomy    HX OTHER SURGICAL      skin cancer removed.      HX OTHER SURGICAL      biopsy on thyroid     HX TUBAL LIGATION      HX UROLOGICAL  07/03/2017    Cysto, TURBT small, Bladder mapping biopsies with fulguration -- Dr. Maxwell Poole at [address] [place] [address] History   Substance Use Topics    Smoking status: Former Smoker     Packs/day: 0.50     Years: 40.00     Quit date: 2/7/2007    Smokeless tobacco: Never Used    Alcohol use 14.4 oz/week     24 Cans of beer per week      Comment: none since 12/16       Allergies   Allergen Reactions    Augmentin [Amoxicillin-Pot Clavulanate] Itching       Family History   Problem Relation Age of Onset    Heart Attack Sister        Current Facility-Administered Medications   Medication Dose Route Frequency Provider Last Rate Last Dose    gentamicin in Saline (Iso-osm) (GARAMYCIN) 80 mg/100 mL IVPB premix 80 mg  80 mg IntraVENous ON CALL TO OR Andrew Head MD        lactated Ringers infusion  25 mL/hr IntraVENous CONTINUOUS Roel Pereira CRNA 25 mL/hr at 07/12/18 1204 25 mL/hr at 07/12/18 1204       Review of Systems    Constitutional: Fever:  negative  Skin: Rash:  negative  HEENT: Hearing difficulty:  negative  Eyes: Blurred vision:  negative  Cardiovascular: Chest pain:  negative  Respiratory: Shortness of breath:  negative  Gastrointestinal: Nausea/vomiting:  negative  Musculoskeletal: Back pain: negative  Neurological: Weakness:  negative  Psychological: Memory loss:  negative  Comments/additional findings:  negative      PHYSICAL EXAMINATION:   Visit Vitals    /84    Pulse 100    Temp 98.2 °F (36.8 °C)    Resp 16    Ht 5' 1\" (1.549 m)    Wt 127 lb 8 oz (57.8 kg)    SpO2 98%    BMI 24.09 kg/m2     Constitutional: WDWN, Pleasant and appropriate affect, No acute distress. CV:  No peripheral swelling noted, Unlabored  Cardio: RRR  Respiratory: No respiratory distress or difficulties  Abdomen:  No abdominal masses or tenderness. No inguinal hernias noted.  Female:  No CVA tenderness. Skin: No jaundice. Neuro/Psych:  Alert and oriented x 3, affect appropriate. Lymphatic:   No enlarged inguinal lymph nodes. REVIEW OF LABS AND IMAGING:      Lab Results   Component Value Date/Time    Sodium 144 06/29/2018 04:05 PM    Potassium 3.8 06/29/2018 04:05 PM    Chloride 104 06/29/2018 04:05 PM    CO2 32 06/29/2018 04:05 PM    Anion gap 8 06/29/2018 04:05 PM    Glucose 86 06/29/2018 04:05 PM    BUN 23 (H) 06/29/2018 04:05 PM    Creatinine 1.37 (H) 06/29/2018 04:05 PM    BUN/Creatinine ratio 17 06/29/2018 04:05 PM    GFR est AA 46 (L) 06/29/2018 04:05 PM    GFR est non-AA 38 (L) 06/29/2018 04:05 PM    Calcium 9.5 06/29/2018 04:05 PM       Lab Results   Component Value Date/Time    WBC 7.3 06/29/2018 04:05 PM    Hemoglobin, POC 15.0 01/23/2017 06:52 AM    HGB 14.8 06/29/2018 04:05 PM    Hematocrit, POC 44 01/23/2017 06:52 AM    HCT 44.9 06/29/2018 04:05 PM    PLATELET 632 86/24/2649 04:05 PM    MCV 92.6 06/29/2018 04:05 PM         A copy of today's office visit with all pertinent imaging results and labs were sent to the referring physician.         Michael Tucker MD

## 2018-07-12 NOTE — ANESTHESIA PREPROCEDURE EVALUATION
Anesthetic History   No history of anesthetic complications            Review of Systems / Medical History  Patient summary reviewed and pertinent labs reviewed    Pulmonary  Within defined limits  COPD            Comments: Former smoker   Neuro/Psych   Within defined limits           Cardiovascular    Hypertension              Exercise tolerance: >4 METS     GI/Hepatic/Renal  Within defined limits              Endo/Other  Within defined limits    Hypothyroidism  Arthritis     Other Findings   Comments: Documentation of current medication  Current medications obtained, documented and obtained? YES      Risk Factors for Postoperative nausea/vomiting:       History of postoperative nausea/vomiting? NO       Female? YES       Motion sickness? NO       Intended opioid administration for postoperative analgesia? YES      Smoking Abstinence:  Current Smoker? NO  Elective Surgery? YES  Seen preoperatively by anesthesiologist or proxy prior to day of surgery? YES  Pt abstained from smoking 24 hours prior to anesthesia?  YES    Preventive care/screening for High Blood Pressure:  Aged 18 years and older: YES  Screened for high blood pressure: YES  Patients with high blood pressure referred to primary care provider   for BP management: YES                     Physical Exam    Airway  Mallampati: III  TM Distance: 4 - 6 cm  Neck ROM: normal range of motion   Mouth opening: Normal    Comments: Unable to open her mouth wide, patient reports some jaw problems Cardiovascular    Rhythm: regular  Rate: normal         Dental    Dentition: Poor dentition  Comments: Poor condition of teeth, several missing and others appeared cracked   Pulmonary  Breath sounds clear to auscultation               Abdominal  GI exam deferred       Other Findings            Anesthetic Plan    ASA: 3  Anesthesia type: general          Induction: Intravenous  Anesthetic plan and risks discussed with: Patient

## 2018-07-12 NOTE — ANESTHESIA POSTPROCEDURE EVALUATION
Post-Anesthesia Evaluation and Assessment    Patient: Trixie Smith MRN: [id number]  SSN: [id number]    YOB: 1945  Age: 74 y.o. Sex: female       Cardiovascular Function/Vital Signs  Visit Vitals    /77    Pulse 84    Temp 36.5 °C (97.7 °F)    Resp 18    Ht 5' 1\" (1.549 m)    Wt 57.8 kg (127 lb 8 oz)    SpO2 100%    BMI 24.09 kg/m2       Patient is status post general anesthesia for Procedure(s):  cystoscopy, BILATERAL RETROGRADE PYELOGRAM, BLADDER biopsy fulguration. Nausea/Vomiting: None    Postoperative hydration reviewed and adequate. Pain:  Pain Scale 1: Numeric (0 - 10) (07/12/18 1600)  Pain Intensity 1: 2 (07/12/18 1600)   Managed    Neurological Status:   Neuro (WDL): Within Defined Limits (07/12/18 1524)  Neuro  LUE Motor Response: Purposeful (07/12/18 1524)  LLE Motor Response: Purposeful (07/12/18 1524)  RUE Motor Response: Purposeful (07/12/18 1524)  RLE Motor Response: Purposeful (07/12/18 1524)   At baseline    Mental Status and Level of Consciousness: Arousable    Pulmonary Status:   O2 Device: Oxygen mask (07/12/18 1524)   Adequate oxygenation and airway patent    Complications related to anesthesia: None    Post-anesthesia assessment completed.  No concerns    Signed By: David Dykes MD     July 12, 2018

## 2018-07-12 NOTE — IP AVS SNAPSHOT
[address] [address] Krissy Cota  
[phone] Patient: Gina Session MRN: [id number] ZBP:[id number] About your hospitalization You were admitted on:  July 12, 2018 You last received care in the:  [facility] PHASE 2 RECOVERY You were discharged on:  July 12, 2018 Why you were hospitalized Your primary diagnosis was:  Not on File Follow-up Information Follow up With Details Comments Contact Info MD Vernell Rivas [address]  [address] [facility] 
[phone] Your Scheduled Appointments Tuesday July 31, 2018  3:45 PM EDT  
ESTABLISHED PATIENT with Wilber Tellez MD  
[facility] [address], [address] [address]  
[phone] Wednesday September 05, 2018  5:00 PM EDT  
CT CHEST W CONT with HBV CT  1 HBV RAD CT ([facility]) Critical access hospital[address] [place] Andee Lesch [address] [phone] GENERAL INSTRUCTIONS  If you were given medications to take for a contrast allergy prior to having this study, please arrange to have someone drive you to your appointment. If you have had a creatinine level drawn within the past 30 days, please bring most recent results to your appt. This study does not require you to drink contrast prior to your study. MEDICATIONS  Bring a complete list of all medications you are currently taking to include prescriptions, over-the-counter meds, herbals, vitamins & any dietary supplements. STUDY DETAILS Patient must be NPO (nothing to eat/drink, including meds and water) for 4 hours prior to study. OUTSIDE FILMS  Bring outside films, CDs, and reports related to the study with you on the day of your exam.  QUESTIONS  Notify the CT Department if you have any questions concerning your study.  Headings - [phone] [facility] [phone] CHECK IN INSTRUCTIONS:  Check in to the Registration desk 30 minutes prior to your appointment. For appointments after 5:00pm Monday-Friday, all day Saturday and Sunday  - check in to the Emergency Room Registration. [facility] at [address] ([address], Main Entrance 1st floor) [address], [address] Discharge Orders None A check jama indicates which time of day the medication should be taken. My Medications START taking these medications Instructions Each Dose to Equal  
 Morning Noon Evening Bedtime  
 ciprofloxacin HCl 500 mg tablet Commonly known as:  CIPRO Your last dose was: Your next dose is: Take 1 Tab by mouth two (2) times a day. 500 mg  
    
   
   
   
  
 docusate sodium 100 mg capsule Commonly known as:  Lashell Running Your last dose was: Your next dose is: Take 1 Cap by mouth two (2) times daily as needed for Constipation for up to 90 days. 100 mg  
    
   
   
   
  
 oxyCODONE-acetaminophen 5-325 mg per tablet Commonly known as:  PERCOCET Your last dose was: Your next dose is: Take 1-2 Tabs by mouth every four (4) hours as needed for Pain. Max Daily Amount: 12 Tabs. 1-2 Tab CONTINUE taking these medications Instructions Each Dose to Equal  
 Morning Noon Evening Bedtime  
 amLODIPine 5 mg tablet Commonly known as:  Oneil Colon Your last dose was: Your next dose is: Take 5 mg by mouth daily. 5 mg  
    
   
   
   
  
 atorvastatin 80 mg tablet Commonly known as:  LIPITOR Your last dose was: Your next dose is:    
   
   
 TK 1 T PO Q EVENING REPLACES 40MG TABS CALCIUM 600 PO Your last dose was: Your next dose is: Take  by mouth. DULERA 100-5 mcg/actuation HFA inhaler Generic drug:  mometasone-formoterol Your last dose was: Your next dose is: Take 1 Puff by inhalation daily. 1 Puff  
    
   
   
   
  
 hydroCHLOROthiazide 25 mg tablet Commonly known as:  HYDRODIURIL Your last dose was: Your next dose is: Take 25 mg by mouth daily. 25 mg  
    
   
   
   
  
 ibandronate 150 mg tablet Commonly known as:  Reg Mcnulty Your last dose was: Your next dose is: Take 150 mg by mouth every thirty (30) days. 150 mg  
    
   
   
   
  
 potassium chloride 40 mEq/15 mL Liqd Commonly known as:  KAON 20% Your last dose was: Your next dose is: Take 22.5 mL by mouth daily. Indications: hypokalemia 60 mEq PROAIR HFA 90 mcg/actuation inhaler Generic drug:  albuterol Your last dose was: Your next dose is:    
   
   
 INHALE 2 PUFFS PO Q 4 H  
     
   
   
   
  
 VITAMIN D3 1,000 unit tablet Generic drug:  cholecalciferol Your last dose was: Your next dose is: Take  by mouth daily. STOP taking these medications MACRODANTIN 100 mg capsule Generic drug:  nitrofurantoin Where to Get Your Medications Information on where to get these meds will be given to you by the nurse or doctor. ! Ask your nurse or doctor about these medications  
  ciprofloxacin HCl 500 mg tablet  
 docusate sodium 100 mg capsule  
 oxyCODONE-acetaminophen 5-325 mg per tablet Opioid Education Prescription Opioids: What You Need to Know: 
 
Prescription opioids can be used to help relieve moderate-to-severe pain and are often prescribed following a surgery or injury, or for certain health conditions. These medications can be an important part of treatment but also come with serious risks.   Opioids are strong pain medicines. Examples include hydrocodone, oxycodone, fentanyl, and morphine. Heroin is an example of an illegal opioid. It is important to work with your health care provider to make sure you are getting the safest, most effective care. WHAT ARE THE RISKS AND SIDE EFFECTS OF OPIOID USE? Prescription opioids carry serious risks of addiction and overdose, especially with prolonged use. An opioid overdose, often marked by slow breathing, can cause sudden death. The use of prescription opioids can have a number of side effects as well, even when taken as directed. · Tolerance-meaning you might need to take more of a medication for the same pain relief · Physical dependence-meaning you have symptoms of withdrawal when the medication is stopped. Withdrawal symptoms can include nausea, sweating, chills, diarrhea, stomach cramps, and muscle aches. Withdrawal can last up to several weeks, depending on which drug you took and how long you took it. · Increased sensitivity to pain · Constipation · Nausea, vomiting, and dry mouth · Sleepiness and dizziness · Confusion · Depression · Low levels of testosterone that can result in lower sex drive, energy, and strength · Itching and sweating RISKS ARE GREATER WITH:      
· History of drug misuse, substance use disorder, or overdose · Mental health conditions (such as depression or anxiety) · Sleep apnea · Older age (72 years or older) · Pregnancy Avoid alcohol while taking prescription opioids. Also, unless specifically advised by your health care provider, medications to avoid include: · Benzodiazepines (such as Xanax or Valium) · Muscle relaxants (such as Soma or Flexeril) · Hypnotics (such as Ambien or Lunesta) · Other prescription opioids KNOW YOUR OPTIONS Talk to your health care provider about ways to manage your pain that don't involve prescription opioids.   Some of these options may actually work better and have fewer risks and side effects. Options may include: 
· Pain relievers such as acetaminophen, ibuprofen, and naproxen · Some medications that are also used for depression or seizures · Physical therapy and exercise · Counseling to help patients learn how to cope better with triggers of pain and stress. · Application of heat or cold compress · Massage therapy · Relaxation techniques Be Informed Make sure you know the name of your medication, how much and how often to take it, and its potential risks & side effects. IF YOU ARE PRESCRIBED OPIOIDS FOR PAIN: 
· Never take opioids in greater amounts or more often than prescribed. Remember the goal is not to be pain-free but to manage your pain at a tolerable level. · Follow up with your primary care provider to: · Work together to create a plan on how to manage your pain. · Talk about ways to help manage your pain that don't involve prescription opioids. · Talk about any and all concerns and side effects. · Help prevent misuse and abuse. · Never sell or share prescription opioids · Help prevent misuse and abuse. · Store prescription opioids in a secure place and out of reach of others (this may include visitors, children, friends, and family). · Safely dispose of unused/unwanted prescription opioids: Find your community drug take-back program or your pharmacy mail-back program, or flush them down the toilet, following guidance from the Food and Drug Administration (www.fda.gov/Drugs/ResourcesForYou). · Visit www.cdc.gov/drugoverdose to learn about the risks of opioid abuse and overdose. · If you believe you may be struggling with addiction, tell your health care provider and ask for guidance or call [address] [place], [place] [address] at [phone]-XQLD. Discharge Instructions Cystoscopy: What to Expect at [facility] Your Recovery A cystoscopy is a procedure that lets a doctor look inside of the bladder and the urethra. The urethra is the tube that carries urine from the bladder to outside the body. The doctor uses a thin, lighted tool called a cystoscope. Your bladder is filled with fluid. This stretches the bladder so that your doctor can look closely at the inside of your bladder. After the cystoscopy, your urethra may be sore at first, and it may burn when you urinate for the first few days after the procedure. You may feel the need to urinate more often, and your urine may be pink. These symptoms should get better in 1 or 2 days. You will probably be able to go back to most of your usual activities in 1 or 2 days. This care sheet gives you a general idea about how long it will take for you to recover. But each person recovers at a different pace. Follow the steps below to get better as quickly as possible. How can you care for yourself at home? Activity 
  · Rest when you feel tired. Getting enough sleep will help you recover.  
  · Try to walk each day. Start by walking a little more than you did the day before. Bit by bit, increase the amount you walk. Walking boosts blood flow and helps prevent pneumonia and constipation.  
  · Avoid strenuous activities, such as bicycle riding, jogging, weight lifting, or aerobic exercise, until your doctor says it is okay.  
  · Ask your doctor when you can drive again.  
  · Most people are able to return to work within 1 or 2 days after the procedure.  
  · You may shower and take baths as usual.  
  · Ask your doctor when it is okay for you to have sex. Diet 
  · You can eat your normal diet. If your stomach is upset, try bland, low-fat foods like plain rice, broiled chicken, toast, and yogurt.  
  · Drink plenty of fluids (unless your doctor tells you not to). Medicines 
  · Take pain medicines exactly as directed.  
¨ If the doctor gave you a prescription medicine for pain, take it as prescribed. ¨ If you are not taking a prescription pain medicine, ask your doctor if you can take an over-the-counter medicine.  
  · If you think your pain medicine is making you sick to your stomach: 
¨ Take your medicine after meals (unless your doctor has told you not to). ¨ Ask your doctor for a different pain medicine.  
  · If your doctor prescribed antibiotics, take them as directed. Do not stop taking them just because you feel better. You need to take the full course of antibiotics. Follow-up care is a key part of your treatment and safety. Be sure to make and go to all appointments, and call your doctor if you are having problems. It's also a good idea to know your test results and keep a list of the medicines you take. When should you call for help? Call 911 anytime you think you may need emergency care. For example, call if: 
  · You passed out (lost consciousness).  
  · You have severe trouble breathing.  
  · You have sudden chest pain and shortness of breath, or you cough up blood.  
  · You have severe belly pain.  
 Call your doctor now or seek immediate medical care if: 
  · You are sick to your stomach or cannot keep fluids down.  
  · Your urine is still red or you see blood clots after you have urinated several times.  
  · You have trouble passing urine or stool, especially if you have pain or swelling in your lower belly.  
  · You have signs of a blood clot, such as: 
¨ Pain in your calf, back of the knee, thigh, or groin. ¨ Redness and swelling in your leg or groin.  
  · You develop a fever or severe chills.  
  · You have pain in your back just below your rib cage. This is called flank pain.  
 Watch closely for changes in your health, and be sure to contact your doctor if: 
  · You have pain or burning when you urinate. A burning feeling is normal for a day or two after the test, but call if it does not get better.   · You have a frequent urge to urinate but can pass only small amounts of urine.  
  · Your urine is pink, red, or cloudy, or smells bad. It is normal for the urine to have a pinkish color for a few days after the test, but call if it does not get better. Where can you learn more? Go to http://gabby-hiro.info/. Enter B456 in the search box to learn more about \"Cystoscopy: What to Expect at Home. \" Current as of: May 12, 2017 Content Version: 11.7 © 6657-4822 [employer]. Care instructions adapted under license by [employer] (which disclaims liability or warranty for this information). If you have questions about a medical condition or this instruction, always ask your healthcare professional. [address] [address] any warranty or liability for your use of this information. DISCHARGE SUMMARY from Nurse PATIENT INSTRUCTIONS: 
 
After general anesthesia or intravenous sedation, for 24 hours or while taking prescription Narcotics: · Limit your activities · Do not drive and operate hazardous machinery · Do not make important personal or business decisions · Do  not drink alcoholic beverages · If you have not urinated within 8 hours after discharge, please contact your surgeon on call. Report the following to your surgeon: 
· Excessive pain, swelling, redness or odor of or around the surgical area · Temperature over 100.5 · Nausea and vomiting lasting longer than 4 hours or if unable to take medications · Any signs of decreased circulation or nerve impairment to extremity: change in color, persistent  numbness, tingling, coldness or increase pain · Any questions What to do at Home: 
Recommended activity: Activity as tolerated and no driving for today These are general instructions for a healthy lifestyle: No smoking/ No tobacco products/ Avoid exposure to second hand smoke Surgeon General's Warning:  Quitting smoking now greatly reduces serious risk to your health. Obesity, smoking, and sedentary lifestyle greatly increases your risk for illness A healthy diet, regular physical exercise & weight monitoring are important for maintaining a healthy lifestyle You may be retaining fluid if you have a history of heart failure or if you experience any of the following symptoms:  Weight gain of 3 pounds or more overnight or 5 pounds in a week, increased swelling in our hands or feet or shortness of breath while lying flat in bed. Please call your doctor as soon as you notice any of these symptoms; do not wait until your next office visit. Recognize signs and symptoms of STROKE: 
 
F-face looks uneven A-arms unable to move or move unevenly S-speech slurred or non-existent T-time-call 911 as soon as signs and symptoms begin-DO NOT go Back to bed or wait to see if you get better-TIME IS BRAIN. Warning Signs of HEART ATTACK Call 911 if you have these symptoms: 
? Chest discomfort. Most heart attacks involve discomfort in the center of the chest that lasts more than a few minutes, or that goes away and comes back. It can feel like uncomfortable pressure, squeezing, fullness, or pain. ? Discomfort in other areas of the upper body. Symptoms can include pain or discomfort in one or both arms, the back, neck, jaw, or stomach. ? Shortness of breath with or without chest discomfort. ? Other signs may include breaking out in a cold sweat, nausea, or lightheadedness. Don't wait more than five minutes to call [address]! Fast action can save your life. Calling 911 is almost always the fastest way to get lifesaving treatment. Emergency Medical Services staff can begin treatment when they arrive  up to an hour sooner than if someone gets to the hospital by car. The discharge information has been reviewed with the patient.   The patient verbalized understanding. Discharge medications reviewed with the patient and appropriate educational materials and side effects teaching were provided. Patient armband removed and given to patient to take home. Patient was informed of the privacy risks if armband lost or stolen. Introducing William Chance As a [employer] patient, I wanted to make you aware of our electronic visit tool called William Chance. [employer] 24/7 allows you to connect within minutes with a medical provider 24 hours a day, seven days a week via a mobile device or tablet or logging into a secure website from your computer. You can access William Chance from anywhere in the [place]. A virtual visit might be right for you when you have a simple condition and feel like you just dont want to get out of bed, or cant get away from work for an appointment, when your regular [employer] provider is not available (evenings, weekends or holidays), or when youre out of town and need minor care. Electronic visits cost only $49 and if the [employer] 24/7 provider determines a prescription is needed to treat your condition, one can be electronically transmitted to a nearby pharmacy*. Please take a moment to enroll today if you have not already done so. The enrollment process is free and takes just a few minutes. To enroll, please download the [employer] 24/7 simone to your tablet or phone, or visit [email or web address] to enroll on your computer. And, as an [address] [place] [address] patient with a [employer] account, the results of your visits will be scanned into your electronic medical record and your primary care provider will be able to view the scanned results. We urge you to continue to see your regular [employer] provider for your ongoing medical care.   And while your primary care provider may not be the one available when you seek a William Chance virtual visit, the peace of mind you get from getting a real diagnosis real time can be priceless. For more information on William Anthonyallen, view our Frequently Asked Questions (FAQs) at [email or web address]. Sincerely, 
 
Haroon Graves MD 
Chief Medical Officer [address] *:  certain medications cannot be prescribed via William Chance Unresulted Labs-Please follow up with your PCP about these lab tests Order Current Status XR RETROGRADE PYELOGRAM In process Providers Seen During Your Hospitalization Provider Specialty Primary office phone Helena Lepe MD Urology [phone] Your Primary Care Physician (PCP) Primary Care Physician Office Phone Office Fax [address], [address] You are allergic to the following Allergen Reactions Augmentin (Amoxicillin-Pot Clavulanate) Itching Recent Documentation Height Weight BMI OB Status Smoking Status 1.549 m 57.8 kg 24.09 kg/m2 Hysterectomy Former Smoker Emergency Contacts Name Discharge Info Relation Home Work Mobile [facility] DISCHARGE CAREGIVER [3] Daughter [21] [phone] Patient Belongings The following personal items are in your possession at time of discharge: 
  Dental Appliances: None         Home Medications: None   Jewelry: None  Clothing: Dress, Undergarments, Footwear    Other Valuables: Eyeglasses Please provide this summary of care documentation to your next provider. Signatures-by signing, you are acknowledging that this After Visit Summary has been reviewed with you and you have received a copy. Patient Signature:  ____________________________________________________________ Date:  ____________________________________________________________  
  
Cat Nash Provider Signature:  ____________________________________________________________ Date:  ____________________________________________________________

## 2018-07-12 NOTE — BRIEF OP NOTE
BRIEF OPERATIVE NOTE    Date of Procedure: 7/12/2018   Preoperative Diagnosis: malignant neoplasm of urinary bladder unspecified site hcc incomplete bladder emptying  Postoperative Diagnosis: malignant neoplasm of urinary bladder unspecified site hcc     Procedure(s):  cystoscopy, BILATERAL RETROGRADE PYELOGRAM, BLADDER biopsy fulguration  Surgeon(s) and Role:     * Franko Pickard MD - Primary         Surgical Assistant: none    Surgical Staff:  Circ-1: Gretta Gordon RN  Scrub Tech-1: Gorge Oglesby  Event Time In   Incision Start  2:56 PM   Incision Close  3:17 PM     Anesthesia: General   Estimated Blood Loss: minimal  Specimens: * No specimens in log *   Findings: See dictation   Complications: none  Implants: * No implants in log *

## 2018-07-13 NOTE — OP NOTES
[address] REPORT    Rod Eduardo  MR#: [id number]  : 1945  ACCOUNT #: [de-identified]   DATE OF SERVICE: 2018    PREOPERATIVE DIAGNOSIS:  History of urothelial carcinoma of the bladder. POSTOPERATIVE DIAGNOSIS:   History of urothelial carcinoma of the bladder. PROCEDURES PERFORMED:  1. Cystoscopy. 2.  Bilateral retrograde pyelogram with intraoperative interpretation. 3.  Bladder mapping biopsy with fulguration. SURGEON:  Juan Arevalo MD    ASSISTANT:  None. ANESTHESIA:  General.    FLUIDS:  Crystalloid. ESTIMATED BLOOD LOSS:  Minimal.    SPECIMENS REMOVED:  1. Posterior bladder wall. 2.  Left lateral wall. 3.  Right lateral wall. 4.  Dome. 5.  Trigone. DRAINS:  None. INDICATION FOR THE PROCEDURE:  The patient is a 77-year-old with a history of urothelial carcinoma of the bladder who on surveillance cystoscopy was noted to have several areas of erythema concerning for carcinoma in situ. Risks, benefits and alternatives were explained. The patient decided to proceed. DETAILS OF PROCEDURE:  The patient was first identified in the holding area and taken back to the operating room. Perioperative antibiotics were given. Sequential compression devices placed. Anesthesia was induced. The patient was placed in dorsal lithotomy position taking care to pad all pressure points. The patient was prepped and draped in the usual sterile fashion. A timeout was performed. First, a 22-Maldivian rigid cystoscope was inserted into the patient's bladder. Systematic cystoscopy with 30 and 70 degree lenses was performed. Multiple red areas were noted over the posterior bladder wall, dome and left lateral wall. Next, we used the 5-Maldivian open-ended catheter to intubate the left ureteral orifice and perform a left retrograde pyelogram with intraoperative interpretation. No hydronephrosis, filling defects or extravasation was noted.   We then turned our attention to the right side. Once again, we intubated the right ureteral orifice with a 5 [place] open-ended catheter. No hydronephrosis, filling defects or extravasation was noted. Once this was done, we proceeded to take bladder biopsies with the cold cup biopsy forceps x5. We then used the Bugbee electrode to fulgurate all sites. The bladder was filled and emptied multiple times. Hemostasis was assured. The patient was then awoken from anesthesia and taken back to PACU in stable condition. Of note, I was scrubbed and present for all critical portions of the case.       Silvana Gardiner MD       [facility] /   D: 07/12/2018 15:19     T: 07/13/2018 06:55  JOB #: [id number]

## 2018-09-05 ENCOUNTER — HOSPITAL ENCOUNTER (OUTPATIENT)
Dept: CT IMAGING | Age: 73
Discharge: HOME | End: 2018-09-05
Attending: INTERNAL MEDICINE
Payer: MEDICARE

## 2018-09-05 DIAGNOSIS — C34.31 PRIMARY MALIGNANT NEOPLASM OF BRONCHUS OF RIGHT LOWER LOBE (HCC): ICD-10-CM

## 2018-09-05 LAB — CREAT UR-MCNC: 1.7 MG/DL (ref 0.6–1.3)

## 2018-09-05 PROCEDURE — 74011636320 HC RX REV CODE- 636/320: Performed by: INTERNAL MEDICINE

## 2018-09-05 PROCEDURE — 82565 ASSAY OF CREATININE: CPT

## 2018-09-05 PROCEDURE — 71260 CT THORAX DX C+: CPT

## 2018-09-05 RX ADMIN — IOPAMIDOL 55 ML: 612 INJECTION, SOLUTION INTRAVENOUS at 19:00

## 2018-10-01 ENCOUNTER — HOSPITAL ENCOUNTER (OUTPATIENT)
Dept: CT IMAGING | Age: 73
Discharge: HOME | End: 2018-10-01
Attending: UROLOGY
Payer: MEDICARE

## 2018-10-01 DIAGNOSIS — C67.9 MALIGNANT NEOPLASM OF URINARY BLADDER, UNSPECIFIED SITE (HCC): ICD-10-CM

## 2018-10-01 LAB — CREAT UR-MCNC: 1.2 MG/DL (ref 0.6–1.3)

## 2018-10-01 PROCEDURE — 82565 ASSAY OF CREATININE: CPT

## 2018-10-01 PROCEDURE — 74176 CT ABD & PELVIS W/O CONTRAST: CPT

## 2019-01-10 ENCOUNTER — HOSPITAL ENCOUNTER (OUTPATIENT)
Dept: CT IMAGING | Age: 74
Discharge: HOME | End: 2019-01-10
Attending: INTERNAL MEDICINE
Payer: MEDICARE

## 2019-01-10 DIAGNOSIS — C34.31 PRIMARY MALIGNANT NEOPLASM OF BRONCHUS OF RIGHT LOWER LOBE (HCC): ICD-10-CM

## 2019-01-10 LAB — CREAT UR-MCNC: 1.2 MG/DL (ref 0.6–1.3)

## 2019-01-10 PROCEDURE — 74011636320 HC RX REV CODE- 636/320: Performed by: INTERNAL MEDICINE

## 2019-01-10 PROCEDURE — 71260 CT THORAX DX C+: CPT

## 2019-01-10 PROCEDURE — 82565 ASSAY OF CREATININE: CPT

## 2019-01-10 RX ADMIN — IOPAMIDOL 80 ML: 612 INJECTION, SOLUTION INTRAVENOUS at 18:47

## 2019-03-26 ENCOUNTER — HOSPITAL ENCOUNTER (OUTPATIENT)
Age: 74
Discharge: HOME | End: 2019-03-26
Attending: INTERNAL MEDICINE
Payer: MEDICARE

## 2019-03-26 ENCOUNTER — HOSPITAL ENCOUNTER (OUTPATIENT)
Dept: GENERAL RADIOLOGY | Age: 74
Discharge: HOME | End: 2019-03-26
Attending: INTERNAL MEDICINE
Payer: MEDICARE

## 2019-03-26 DIAGNOSIS — M25.512 LEFT SHOULDER PAIN: ICD-10-CM

## 2019-03-26 DIAGNOSIS — G89.29 CHRONIC PAIN: ICD-10-CM

## 2019-03-26 PROCEDURE — 73030 X-RAY EXAM OF SHOULDER: CPT

## 2019-03-26 PROCEDURE — 73221 MRI JOINT UPR EXTREM W/O DYE: CPT

## 2019-03-30 ENCOUNTER — HOSPITAL ENCOUNTER (OUTPATIENT)
Dept: PREADMISSION TESTING | Age: 74
Discharge: HOME | End: 2019-03-30
Payer: MEDICARE

## 2019-03-30 DIAGNOSIS — C67.9 BLADDER CANCER (HCC): ICD-10-CM

## 2019-03-30 DIAGNOSIS — R35.1 NOCTURIA: ICD-10-CM

## 2019-03-30 DIAGNOSIS — Z01.818 PRE-OP EVALUATION: ICD-10-CM

## 2019-03-30 LAB
ANION GAP SERPL CALC-SCNC: 4 MMOL/L (ref 3–18)
APTT PPP: 26.9 SEC (ref 23–36.4)
BASOPHILS # BLD: 0.1 K/UL (ref 0–0.1)
BASOPHILS NFR BLD: 1 % (ref 0–2)
BUN SERPL-MCNC: 20 MG/DL (ref 7–18)
BUN/CREAT SERPL: 17 (ref 12–20)
CALCIUM SERPL-MCNC: 9.4 MG/DL (ref 8.5–10.1)
CHLORIDE SERPL-SCNC: 104 MMOL/L (ref 100–108)
CO2 SERPL-SCNC: 32 MMOL/L (ref 21–32)
CREAT SERPL-MCNC: 1.16 MG/DL (ref 0.6–1.3)
DIFFERENTIAL METHOD BLD: NORMAL
EOSINOPHIL # BLD: 0.1 K/UL (ref 0–0.4)
EOSINOPHIL NFR BLD: 2 % (ref 0–5)
ERYTHROCYTE [DISTWIDTH] IN BLOOD BY AUTOMATED COUNT: 14.4 % (ref 11.6–14.5)
GLUCOSE SERPL-MCNC: 100 MG/DL (ref 74–99)
HCT VFR BLD AUTO: 44.3 % (ref 35–45)
HGB BLD-MCNC: 14.9 G/DL (ref 12–16)
INR PPP: 0.9 (ref 0.8–1.2)
LYMPHOCYTES # BLD: 1.8 K/UL (ref 0.9–3.6)
LYMPHOCYTES NFR BLD: 26 % (ref 21–52)
MCH RBC QN AUTO: 30.2 PG (ref 24–34)
MCHC RBC AUTO-ENTMCNC: 33.6 G/DL (ref 31–37)
MCV RBC AUTO: 89.7 FL (ref 74–97)
MONOCYTES # BLD: 0.7 K/UL (ref 0.05–1.2)
MONOCYTES NFR BLD: 10 % (ref 3–10)
NEUTS SEG # BLD: 4.2 K/UL (ref 1.8–8)
NEUTS SEG NFR BLD: 61 % (ref 40–73)
PLATELET # BLD AUTO: 231 K/UL (ref 135–420)
PMV BLD AUTO: 9.7 FL (ref 9.2–11.8)
POTASSIUM SERPL-SCNC: 3.9 MMOL/L (ref 3.5–5.5)
PROTHROMBIN TIME: 11.8 SEC (ref 11.5–15.2)
RBC # BLD AUTO: 4.94 M/UL (ref 4.2–5.3)
SODIUM SERPL-SCNC: 140 MMOL/L (ref 136–145)
WBC # BLD AUTO: 6.8 K/UL (ref 4.6–13.2)

## 2019-03-30 PROCEDURE — 85610 PROTHROMBIN TIME: CPT

## 2019-03-30 PROCEDURE — 85025 COMPLETE CBC W/AUTO DIFF WBC: CPT

## 2019-03-30 PROCEDURE — 80048 BASIC METABOLIC PNL TOTAL CA: CPT

## 2019-03-30 PROCEDURE — 85730 THROMBOPLASTIN TIME PARTIAL: CPT

## 2019-03-30 PROCEDURE — 36415 COLL VENOUS BLD VENIPUNCTURE: CPT

## 2019-03-30 PROCEDURE — 93005 ELECTROCARDIOGRAM TRACING: CPT

## 2019-03-31 LAB
ATRIAL RATE: 97 BPM
CALCULATED P AXIS, ECG09: 42 DEGREES
CALCULATED R AXIS, ECG10: 45 DEGREES
CALCULATED T AXIS, ECG11: 165 DEGREES
DIAGNOSIS, 93000: NORMAL
P-R INTERVAL, ECG05: 136 MS
Q-T INTERVAL, ECG07: 362 MS
QRS DURATION, ECG06: 76 MS
QTC CALCULATION (BEZET), ECG08: 459 MS
VENTRICULAR RATE, ECG03: 97 BPM

## 2019-04-03 ENCOUNTER — ANESTHESIA EVENT (OUTPATIENT)
Dept: SURGERY | Age: 74
End: 2019-04-03
Payer: MEDICARE

## 2019-04-03 NOTE — PERIOP NOTES
PAT - SURGICAL PRE-ADMISSION INSTRUCTIONS 
 
NAME:  Ammon Bryant                                                          TODAY'S DATE:  4/3/2019 SURGERY DATE:  4/4/2019                                  SURGERY ARRIVAL TIME:   TBA 1. Do NOT eat or drink anything, including candy or gum, after MIDNIGHT on 4/3/19 , unless you have specific instructions from your Surgeon or Anesthesia Provider to do so. 2. No smoking on the day of surgery. 3. No alcohol 24 hours prior to the day of surgery. 4. No recreational drugs for one week prior to the day of surgery. 5. Leave all valuables, including money/purse, at home. 6. Remove all jewelry, nail polish, makeup (including mascara); no lotions, powders, deodorant, or perfume/cologne/after shave. 7. Glasses/Contact lenses and Dentures may be worn to the hospital.  They will be removed prior to surgery. 8. Call your doctor if symptoms of a cold or illness develop within 24 ours prior to surgery. 9. AN ADULT MUST DRIVE YOU HOME AFTER OUTPATIENT SURGERY. 10. If you are having an OUTPATIENT procedure, please make arrangements for a responsible adult to be with you for 24 hours after your surgery. 11. If you are admitted to the hospital, you will be assigned to a bed after surgery is complete. Normally a family member will not be able to see you until you are in your assigned bed. 15. Family is encouraged to accompany you to the hospital.  We ask visitors in the treatment area to be limited to ONE person at a time to ensure patient privacy. EXCEPTIONS WILL BE MADE AS NEEDED. 15. Children under 12 are discouraged from entering the treatment area and need to be supervised by an adult when in the waiting room. Special Instructions: 
 
Bring inhaler. , Take these medications the morning of surgery with a sip of water:  AMLODIPINE, DULURA Patient Prep: 
 
shower with anti-bacterial soap These surgical instructions were reviewed with PATIENT during the PAT PHONE CALL. Directions: On the morning of surgery, please go to the [facility]. Enter the building from the [facility] entrance, 1st floor (next to the Emergency Room entrance). Take the elevator to the 2nd floor. Sign in at the Registration Desk. If you have any questions and/or concerns, please do not hesitate to call: 
(Prior to the day of surgery)  [facility] unit:  [phone] (Day of surgery)  [facility] unit:  [phone]

## 2019-04-04 ENCOUNTER — ANESTHESIA (OUTPATIENT)
Dept: SURGERY | Age: 74
End: 2019-04-04
Payer: MEDICARE

## 2019-04-04 ENCOUNTER — HOSPITAL ENCOUNTER (OUTPATIENT)
Age: 74
Setting detail: OUTPATIENT SURGERY
Discharge: HOME | End: 2019-04-04
Attending: UROLOGY | Admitting: UROLOGY
Payer: MEDICARE

## 2019-04-04 ENCOUNTER — APPOINTMENT (OUTPATIENT)
Dept: GENERAL RADIOLOGY | Age: 74
End: 2019-04-04
Attending: UROLOGY
Payer: MEDICARE

## 2019-04-04 VITALS
BODY MASS INDEX: 24.92 KG/M2 | OXYGEN SATURATION: 94 % | RESPIRATION RATE: 16 BRPM | TEMPERATURE: 98.4 F | HEART RATE: 91 BPM | HEIGHT: 61 IN | DIASTOLIC BLOOD PRESSURE: 69 MMHG | SYSTOLIC BLOOD PRESSURE: 136 MMHG | WEIGHT: 132 LBS

## 2019-04-04 DIAGNOSIS — C67.9 MALIGNANT NEOPLASM OF URINARY BLADDER, UNSPECIFIED SITE (HCC): Primary | ICD-10-CM

## 2019-04-04 PROCEDURE — 76210000006 HC OR PH I REC 0.5 TO 1 HR: Performed by: UROLOGY

## 2019-04-04 PROCEDURE — 74420 UROGRAPHY RTRGR +-KUB: CPT

## 2019-04-04 PROCEDURE — 88305 TISSUE EXAM BY PATHOLOGIST: CPT

## 2019-04-04 PROCEDURE — 74011636320 HC RX REV CODE- 636/320: Performed by: UROLOGY

## 2019-04-04 PROCEDURE — 74011250636 HC RX REV CODE- 250/636: Performed by: UROLOGY

## 2019-04-04 PROCEDURE — 77030012510 HC MSK AIRWY LMA TELE -B: Performed by: ANESTHESIOLOGY

## 2019-04-04 PROCEDURE — 74011000258 HC RX REV CODE- 258: Performed by: UROLOGY

## 2019-04-04 PROCEDURE — 74011250636 HC RX REV CODE- 250/636: Performed by: NURSE ANESTHETIST, CERTIFIED REGISTERED

## 2019-04-04 PROCEDURE — 76010000138 HC OR TIME 0.5 TO 1 HR: Performed by: UROLOGY

## 2019-04-04 PROCEDURE — 77030032490 HC SLV COMPR SCD KNE COVD -B: Performed by: UROLOGY

## 2019-04-04 PROCEDURE — 77030018836 HC SOL IRR NACL ICUM -A: Performed by: UROLOGY

## 2019-04-04 PROCEDURE — 76060000032 HC ANESTHESIA 0.5 TO 1 HR: Performed by: UROLOGY

## 2019-04-04 PROCEDURE — 74011250636 HC RX REV CODE- 250/636

## 2019-04-04 PROCEDURE — C1758 CATHETER, URETERAL: HCPCS | Performed by: UROLOGY

## 2019-04-04 PROCEDURE — 74011250637 HC RX REV CODE- 250/637: Performed by: NURSE ANESTHETIST, CERTIFIED REGISTERED

## 2019-04-04 PROCEDURE — 76210000021 HC REC RM PH II 0.5 TO 1 HR: Performed by: UROLOGY

## 2019-04-04 RX ORDER — TRAMADOL HYDROCHLORIDE 50 MG/1
50 TABLET ORAL
Qty: 18 TAB | Refills: 0 | Status: SHIPPED | OUTPATIENT
Start: 2019-04-04 | End: 2019-04-07

## 2019-04-04 RX ORDER — SODIUM CHLORIDE, SODIUM LACTATE, POTASSIUM CHLORIDE, CALCIUM CHLORIDE 600; 310; 30; 20 MG/100ML; MG/100ML; MG/100ML; MG/100ML
50 INJECTION, SOLUTION INTRAVENOUS CONTINUOUS
Status: DISCONTINUED | OUTPATIENT
Start: 2019-04-04 | End: 2019-04-04 | Stop reason: HOSPADM

## 2019-04-04 RX ORDER — FENTANYL CITRATE 50 UG/ML
25 INJECTION, SOLUTION INTRAMUSCULAR; INTRAVENOUS AS NEEDED
Status: DISCONTINUED | OUTPATIENT
Start: 2019-04-04 | End: 2019-04-04 | Stop reason: HOSPADM

## 2019-04-04 RX ORDER — OXYCODONE AND ACETAMINOPHEN 5; 325 MG/1; MG/1
1 TABLET ORAL AS NEEDED
Status: DISCONTINUED | OUTPATIENT
Start: 2019-04-04 | End: 2019-04-04 | Stop reason: HOSPADM

## 2019-04-04 RX ORDER — PROPOFOL 10 MG/ML
INJECTION, EMULSION INTRAVENOUS AS NEEDED
Status: DISCONTINUED | OUTPATIENT
Start: 2019-04-04 | End: 2019-04-04 | Stop reason: HOSPADM

## 2019-04-04 RX ORDER — DEXTROSE MONOHYDRATE 25 G/50ML
25-50 INJECTION, SOLUTION INTRAVENOUS AS NEEDED
Status: DISCONTINUED | OUTPATIENT
Start: 2019-04-04 | End: 2019-04-04 | Stop reason: HOSPADM

## 2019-04-04 RX ORDER — SODIUM CHLORIDE, SODIUM LACTATE, POTASSIUM CHLORIDE, CALCIUM CHLORIDE 600; 310; 30; 20 MG/100ML; MG/100ML; MG/100ML; MG/100ML
INJECTION, SOLUTION INTRAVENOUS
Status: DISCONTINUED | OUTPATIENT
Start: 2019-04-04 | End: 2019-04-04

## 2019-04-04 RX ORDER — SODIUM CHLORIDE 0.9 % (FLUSH) 0.9 %
5-40 SYRINGE (ML) INJECTION EVERY 8 HOURS
Status: DISCONTINUED | OUTPATIENT
Start: 2019-04-04 | End: 2019-04-04 | Stop reason: HOSPADM

## 2019-04-04 RX ORDER — SODIUM CHLORIDE, SODIUM LACTATE, POTASSIUM CHLORIDE, CALCIUM CHLORIDE 600; 310; 30; 20 MG/100ML; MG/100ML; MG/100ML; MG/100ML
25 INJECTION, SOLUTION INTRAVENOUS CONTINUOUS
Status: DISCONTINUED | OUTPATIENT
Start: 2019-04-04 | End: 2019-04-04 | Stop reason: HOSPADM

## 2019-04-04 RX ORDER — ONDANSETRON 2 MG/ML
INJECTION INTRAMUSCULAR; INTRAVENOUS AS NEEDED
Status: DISCONTINUED | OUTPATIENT
Start: 2019-04-04 | End: 2019-04-04 | Stop reason: HOSPADM

## 2019-04-04 RX ORDER — NITROFURANTOIN 25; 75 MG/1; MG/1
100 CAPSULE ORAL 2 TIMES DAILY
Qty: 14 CAP | Refills: 0 | Status: SHIPPED | OUTPATIENT
Start: 2019-04-04 | End: 2019-08-02

## 2019-04-04 RX ORDER — SODIUM CHLORIDE 0.9 % (FLUSH) 0.9 %
5-40 SYRINGE (ML) INJECTION AS NEEDED
Status: DISCONTINUED | OUTPATIENT
Start: 2019-04-04 | End: 2019-04-04 | Stop reason: HOSPADM

## 2019-04-04 RX ORDER — NITROFURANTOIN 25; 75 MG/1; MG/1
100 CAPSULE ORAL 2 TIMES DAILY
Qty: 14 CAP | Refills: 0 | Status: SHIPPED | OUTPATIENT
Start: 2019-04-04 | End: 2019-05-28

## 2019-04-04 RX ORDER — FAMOTIDINE 20 MG/1
20 TABLET, FILM COATED ORAL ONCE
Status: COMPLETED | OUTPATIENT
Start: 2019-04-04 | End: 2019-04-04

## 2019-04-04 RX ORDER — MAGNESIUM SULFATE 100 %
4 CRYSTALS MISCELLANEOUS AS NEEDED
Status: DISCONTINUED | OUTPATIENT
Start: 2019-04-04 | End: 2019-04-04 | Stop reason: HOSPADM

## 2019-04-04 RX ORDER — FENTANYL CITRATE 50 UG/ML
50 INJECTION, SOLUTION INTRAMUSCULAR; INTRAVENOUS
Status: DISCONTINUED | OUTPATIENT
Start: 2019-04-04 | End: 2019-04-04 | Stop reason: HOSPADM

## 2019-04-04 RX ORDER — FENTANYL CITRATE 50 UG/ML
INJECTION, SOLUTION INTRAMUSCULAR; INTRAVENOUS AS NEEDED
Status: DISCONTINUED | OUTPATIENT
Start: 2019-04-04 | End: 2019-04-04 | Stop reason: HOSPADM

## 2019-04-04 RX ORDER — LIDOCAINE HYDROCHLORIDE 20 MG/ML
INJECTION, SOLUTION EPIDURAL; INFILTRATION; INTRACAUDAL; PERINEURAL AS NEEDED
Status: DISCONTINUED | OUTPATIENT
Start: 2019-04-04 | End: 2019-04-04 | Stop reason: HOSPADM

## 2019-04-04 RX ADMIN — SODIUM CHLORIDE, SODIUM LACTATE, POTASSIUM CHLORIDE, AND CALCIUM CHLORIDE 50 ML/HR: 600; 310; 30; 20 INJECTION, SOLUTION INTRAVENOUS at 11:33

## 2019-04-04 RX ADMIN — GENTAMICIN SULFATE 239.2 MG: 40 INJECTION, SOLUTION INTRAMUSCULAR; INTRAVENOUS at 12:36

## 2019-04-04 RX ADMIN — ONDANSETRON 4 MG: 2 INJECTION INTRAMUSCULAR; INTRAVENOUS at 12:45

## 2019-04-04 RX ADMIN — FAMOTIDINE 20 MG: 20 TABLET ORAL at 11:25

## 2019-04-04 RX ADMIN — FENTANYL CITRATE 25 MCG: 50 INJECTION, SOLUTION INTRAMUSCULAR; INTRAVENOUS at 13:29

## 2019-04-04 RX ADMIN — FENTANYL CITRATE 25 MCG: 50 INJECTION, SOLUTION INTRAMUSCULAR; INTRAVENOUS at 12:31

## 2019-04-04 RX ADMIN — SODIUM CHLORIDE, SODIUM LACTATE, POTASSIUM CHLORIDE, AND CALCIUM CHLORIDE: 600; 310; 30; 20 INJECTION, SOLUTION INTRAVENOUS at 12:26

## 2019-04-04 RX ADMIN — LIDOCAINE HYDROCHLORIDE 40 MG: 20 INJECTION, SOLUTION EPIDURAL; INFILTRATION; INTRACAUDAL; PERINEURAL at 12:31

## 2019-04-04 RX ADMIN — PROPOFOL 120 MG: 10 INJECTION, EMULSION INTRAVENOUS at 12:31

## 2019-04-04 NOTE — ANESTHESIA PREPROCEDURE EVALUATION
Relevant Problems   No relevant active problems       Anesthetic History               Review of Systems / Medical History  Patient summary reviewed and pertinent labs reviewed    Pulmonary    COPD: moderate               Neuro/Psych              Cardiovascular    Hypertension: well controlled              Exercise tolerance: <4 METS     GI/Hepatic/Renal         Renal disease       Endo/Other      Hypothyroidism: well controlled  Arthritis and cancer     Other Findings              Physical Exam    Airway  Mallampati: III  TM Distance: 4 - 6 cm  Neck ROM: decreased range of motion   Mouth opening: Diminished (comment)     Cardiovascular    Rhythm: regular  Rate: normal         Dental    Dentition: Implants, Caps/crowns and Poor dentition     Pulmonary  Breath sounds clear to auscultation               Abdominal  GI exam deferred       Other Findings            Anesthetic Plan    ASA: 3  Anesthesia type: general          Induction: Intravenous  Anesthetic plan and risks discussed with: Patient

## 2019-04-04 NOTE — PERIOP NOTES
Phase 2 Recovery Summary Patient arrived to Phase 2 Report received from [place], [address] Vitals:  
 04/04/19 1311 04/04/19 1316 04/04/19 1321 04/04/19 1350 BP: 130/75 134/78 137/75 136/69 Pulse: 90 87 92 91 Resp: 15 17 16 16 Temp:      
SpO2: 100% 100% 99% 94% Weight:      
Height:      
 
 
oriented to time, place, person and situation Lines and Drains Peripheral Intravenous Line:  
Peripheral IV 04/04/19 Left Hand (Active) Site Assessment Clean, dry, & intact 4/4/2019  1:50 PM  
Phlebitis Assessment 0 4/4/2019  1:50 PM  
Infiltration Assessment 0 4/4/2019  1:50 PM  
Dressing Status Clean, dry, & intact 4/4/2019  1:50 PM  
Dressing Type Tape;Transparent 4/4/2019  1:50 PM  
Hub Color/Line Status Infusing;Blue 4/4/2019  1:50 PM  
Action Taken Open ports on tubing capped 4/4/2019  1:06 PM  
Alcohol Cap Used Yes 4/4/2019  1:06 PM  
 
 
Wound Wound Perineum (Active) Number of days: 266 Wound Vagina (Active) Number of days: 266 Patient discharged to home with Jose Juan Hartman

## 2019-04-04 NOTE — DISCHARGE INSTRUCTIONS
Patient Education        Cystoscopy: What to Expect at [facility]    A cystoscopy is a procedure that lets a doctor look inside of the bladder and the urethra. The urethra is the tube that carries urine from the bladder to outside the body. The doctor uses a thin, lighted tool called a cystoscope. Your bladder is filled with fluid. This stretches the bladder so that your doctor can look closely at the inside of your bladder. After the cystoscopy, your urethra may be sore at first, and it may burn when you urinate for the first few days after the procedure. You may feel the need to urinate more often, and your urine may be pink. These symptoms should get better in 1 or 2 days. You will probably be able to go back to most of your usual activities in 1 or 2 days. This care sheet gives you a general idea about how long it will take for you to recover. But each person recovers at a different pace. Follow the steps below to get better as quickly as possible. How can you care for yourself at home? Activity    · Rest when you feel tired. Getting enough sleep will help you recover.     · Try to walk each day. Start by walking a little more than you did the day before. Bit by bit, increase the amount you walk. Walking boosts blood flow and helps prevent pneumonia and constipation.     · Avoid strenuous activities, such as bicycle riding, jogging, weight lifting, or aerobic exercise, until your doctor says it is okay.     · Ask your doctor when you can drive again.     · Most people are able to return to work within 1 or 2 days after the procedure.     · You may shower and take baths as usual.     · Ask your doctor when it is okay for you to have sex. Diet    · You can eat your normal diet. If your stomach is upset, try bland, low-fat foods like plain rice, broiled chicken, toast, and yogurt.     · Drink plenty of fluids (unless your doctor tells you not to).    Medicines    · Take pain medicines exactly as directed. ? If the doctor gave you a prescription medicine for pain, take it as prescribed. ? If you are not taking a prescription pain medicine, ask your doctor if you can take an over-the-counter medicine.     · If you think your pain medicine is making you sick to your stomach:  ? Take your medicine after meals (unless your doctor has told you not to). ? Ask your doctor for a different pain medicine.     · If your doctor prescribed antibiotics, take them as directed. Do not stop taking them just because you feel better. You need to take the full course of antibiotics. Follow-up care is a key part of your treatment and safety. Be sure to make and go to all appointments, and call your doctor if you are having problems. It's also a good idea to know your test results and keep a list of the medicines you take. When should you call for help? Call 911 anytime you think you may need emergency care. For example, call if:    · You passed out (lost consciousness).     · You have severe trouble breathing.     · You have sudden chest pain and shortness of breath, or you cough up blood.     · You have severe belly pain.    Call your doctor now or seek immediate medical care if:    · You are sick to your stomach or cannot keep fluids down.     · Your urine is still red or you see blood clots after you have urinated several times.     · You have trouble passing urine or stool, especially if you have pain or swelling in your lower belly.     · You have signs of a blood clot, such as:  ? Pain in your calf, back of the knee, thigh, or groin. ? Redness and swelling in your leg or groin.     · You develop a fever or severe chills.     · You have pain in your back just below your rib cage. This is called flank pain.    Watch closely for changes in your health, and be sure to contact your doctor if:    · You have pain or burning when you urinate.  A burning feeling is normal for a day or two after the test, but call if it does not get better.     · You have a frequent urge to urinate but can pass only small amounts of urine.     · Your urine is pink, red, or cloudy, or smells bad. It is normal for the urine to have a pinkish color for a few days after the test, but call if it does not get better. Where can you learn more? Go to http://gabby-hiro.info/. Enter E527 in the search box to learn more about \"Cystoscopy: What to Expect at Home. \"  Current as of: March 20, 2018  Content Version: 11.9  © 9492-9459 [employer]. Care instructions adapted under license by [employer] (which disclaims liability or warranty for this information). If you have questions about a medical condition or this instruction, always ask your healthcare professional. [address] any warranty or liability for your use of this information. DISCHARGE SUMMARY from Nurse    PATIENT INSTRUCTIONS:    After general anesthesia or intravenous sedation, for 24 hours or while taking prescription Narcotics:  · Limit your activities  · Do not drive and operate hazardous machinery  · Do not make important personal or business decisions  · Do  not drink alcoholic beverages  · If you have not urinated within 8 hours after discharge, please contact your surgeon on call.     Report the following to your surgeon:  · Excessive pain, swelling, redness or odor of or around the surgical area  · Temperature over 100.5  · Nausea and vomiting lasting longer than 4 hours or if unable to take medications  · Any signs of decreased circulation or nerve impairment to extremity: change in color, persistent  numbness, tingling, coldness or increase pain  · Any questions    What to do at Home:    These are general instructions for a healthy lifestyle:    No smoking/ No tobacco products/ Avoid exposure to second hand smoke  Surgeon General's Warning:  Quitting smoking now greatly reduces serious risk to your health. Obesity, smoking, and sedentary lifestyle greatly increases your risk for illness    A healthy diet, regular physical exercise & weight monitoring are important for maintaining a healthy lifestyle    You may be retaining fluid if you have a history of heart failure or if you experience any of the following symptoms:  Weight gain of 3 pounds or more overnight or 5 pounds in a week, increased swelling in our hands or feet or shortness of breath while lying flat in bed. Please call your doctor as soon as you notice any of these symptoms; do not wait until your next office visit. Recognize signs and symptoms of STROKE:    F-face looks uneven    A-arms unable to move or move unevenly    S-speech slurred or non-existent    T-time-call 911 as soon as signs and symptoms begin-DO NOT go       Back to bed or wait to see if you get better-TIME IS BRAIN. Warning Signs of HEART ATTACK     Call 911 if you have these symptoms:   Chest discomfort. Most heart attacks involve discomfort in the center of the chest that lasts more than a few minutes, or that goes away and comes back. It can feel like uncomfortable pressure, squeezing, fullness, or pain.  Discomfort in other areas of the upper body. Symptoms can include pain or discomfort in one or both arms, the back, neck, jaw, or stomach.  Shortness of breath with or without chest discomfort.  Other signs may include breaking out in a cold sweat, nausea, or lightheadedness. Don't wait more than five minutes to call 911 - MINUTES MATTER! Fast action can save your life. Calling 911 is almost always the fastest way to get lifesaving treatment. Emergency Medical Services staff can begin treatment when they arrive -- up to an hour sooner than if someone gets to the hospital by car. The discharge information has been reviewed with the patient. The patient verbalized understanding.   Discharge medications reviewed with the patient and appropriate educational materials and side effects teaching were provided. ___________________________________________________________________________________________________________________________________    Patient armband removed and given to patient to take home.   Patient was informed of the privacy risks if armband lost or stolen

## 2019-04-04 NOTE — PERIOP NOTES
Jovanna Burden 135 care of patient upon arrival to PACU. Patient drowsy, responsive to verbal stimuli. Placed on monitor x3. VSS. Visual pain scale 0.  
 
1333  POC (daughter Rakel) updated. In surgical waiting area.

## 2019-04-04 NOTE — H&P
Ammon Millsidel 1945 
  
ASSESSMENT:  
    ICD-10-CM ICD-9-CM    
1. Malignant neoplasm of urinary bladder, unspecified site (HCC) C67.9 188.9 CYTOLOGY NON GYN, [facility]  
      AMB POC URINALYSIS DIP STICK AUTO W/O MICRO  
      CYSTOURETHROSCOPY 2. Nocturia R35.1 788.43 URINE C&S 1. Patient is a 74 y. o. 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  
  
Recent Imaging: CT chest 10/9/17 showed Mild increased rounded contour and size of the more focal right lower lobe region which appears to correspond with prior mass concerning for tumor progression.   
                      PET/CT 1/26/18 showed moderately distended bladder shows no definite bladder wall abnormality, although, subtle pathology can be obscured by the intensive FDG urine uptake; the chronic right infrahilar consolidation with mild metabolic activity appears stable, likely chronic inflammation/scarring, although, potential residual treated tumor cannot be entirely excluded and warrants imaging surveillance; no FDG avid abnormality to suggest metastasis. CT C/A/P 10/1/18 - 1.   The previously seen thickening along the dome of the bladder is less conspicuous. No gross mass identified. 2.  Mild thickening of the urethra of uncertain significance. Right lower lobe pulmonary mass similar to prior CT. 
  
Last Creatinine: 1.5 on 7/19/17. Last Cystoscopy:  Today 3/15/2019 - increased erythema on left lateral wall with approx 1cm area of fibrinous and papillary features concerning for carcinoma in situ 
  
First Tumor: 1/23/17 TURBT History: 1/23/17 positive for HG Ta papillary UCB; 7/3/17 positive for HG Ta UCB. 7/12/18 positive for LG papillary urothelial neoplasm Intravesicle Therapy: BCG induction 6/6 instillations completed 5/2/17; BCG re-induction 2/6 instillations completed 9/14/17;  Half-strength BCG maintenance 3/3 instillations completed 1/9/18. Half-strength BCG 3/3 9/2018 
  
Current Disease Status: Cysto today (3/15/2019) with evidence of recurrence Current Treatment Plan: Plan for Cysto, bladder biopsy, fulguration, b/l rpg 
  PLAN:    
· Cysto today - increased erythema on left lateral wall with approx 1cm area of fibrinous and papillary features concerning for carcinoma in situ · Discussed indication for Cysto, bladder biopsy, fulguration, b/l rpg. Reviewed risks and benefits. Surgery letter sent. · Patient will need pre-op testing · Urine sent for cytology, U CX 
· Continue CIC x4 daily · To OR for cysto, bladder biopsy, b/l rpg 
  
  
   
Chief Complaint Patient presents with  Bladder Cancer  
  
HISTORY OF PRESENT ILLNESS: 
Mike Chen is a 74 y.o.  female HG [id number] papillary urothelial carcinoma of the bladder diagnosed on 1/23/17 last bcg 9/12/18 
  
The patient has a history of lung cancer. She was diagnosed with carcinoma of the right lower lobe in 2014. She underwent stereotatic XRT in 2014 and 2016. When she underwent a CT of her abdomen and pelvis on 11/16/16 in surveillance for her lung cancer, she was noted to have a 1 cm left urinary bladder mass. She underwent a cystoscopy on 12/14/16 to further assess this, which confirmed a 1 cm tumor on the left posterior bladder wall. Her urine cytology also showed atypical urothelial cells favoring a neoplastic process. She subsequently underwent a cystoscopy with TURBT, bilateral retrogrades, and Mitomycin-C instillation at [facility] on 01/23/17. Her pathology revealed high grade non-invasive papillary urothelial carcinoma. She completed 6 instillations of BCG on 05/02/17. She underwent a repeat CT of her chest, abdomen, and pelvis on 06/20/17 in surveillance for her lung cancer.  The masslike opacity in her right lower lobe had increased from 4.4 x 3.8 cm to 4.7 x 4.0 cm, but there was no convincing evidence of mets by CT c/a/p on 06/20/17. She underwent a surveillance cystoscopy on 06/23/17, which revealed a 1 cm tumor on the posterior bladder wall near the dome and multiple areas of erythema on the posterior bladder wall and left lateral wall, approximately 1 cm each. Her urine cytology also showed atypical urothelial cells favoring a neoplastic process. She subsequently underwent a cystoscopy with TURBT and bladder mapping biopsies and fulguration at [facility] on 07/03/17. Her pathology revealed high-grade non-invasive papillary urothelial carcinoma of the bladder dome with no invasion into the muscularis propria or lymphovascular space. Cysto on  6/1/18 revealed three ~5 mm red areas on the anterior, posterior and right bladder wall concerning for CIS.   
Cysto today (3/15/2019) revealed increased erythema on left lateral wall with approx 1cm area of fibrinous and papillary features concerning for carcinoma in situ. 
  
Today, patient reports she has been doing well. Continues with CIC x4 daily. No infections, no gross hematuria. No  medications 
  
Review of Systems Constitutional: Fever: No 
Skin: Rash: No 
HEENT: Hearing difficulty: No 
Eyes: Blurred vision: No 
Cardiovascular: Chest pain: No 
Respiratory: Shortness of breath: No 
Gastrointestinal: Nausea/vomiting: No 
Musculoskeletal: Back pain: No 
Neurological: Weakness: No 
Psychological: Memory loss: No 
Comments/additional findings:  
  
    
Past Medical History:  
Diagnosis Date  Arthritis    
  Knees and left shoulder  Cancer ([address] 1978  
  squamous cell CA skin  High cholesterol    
 Hypertension    
 Lung cancer ([address] 4/9/2014  Malignant neoplasm of bladder ([address] 01/23/2017  
  High grade TCC, s/p TURBT small +Mitomycin-C instillation -- Dr. Mckenzie Michaels  Mass of bladder    
 Pulmonary nodule 1/4/14  Radiation    
 Recurrent UTI    
 Thyroid disease 2001  
  
     
Past Surgical History:  
Procedure Laterality Date  CHEST SURGERY PROCEDURE UNLISTED      
  Radiosurgery  HX APPENDECTOMY      
 HX COLONOSCOPY     HX CYST REMOVAL Left    
  Breast  
 HX GYN      
  hysterectomy  HX OTHER SURGICAL      
  skin cancer removed.  HX OTHER SURGICAL      
  biopsy on thyroid  HX TUBAL LIGATION      
 HX UROLOGICAL   2017  
  Cysto, TURBT small, Bladder mapping biopsies with fulguration -- Dr. Jessica Holcomb at [address] Use  Smoking status: Former Smoker  
    Packs/day: 0.50  
    Years: 40.00  
    Pack years: 20.00  
    Last attempt to quit: 2007  
    Years since quittin.1  Smokeless tobacco: Never Used Substance Use Topics  Alcohol use: Yes  
    Alcohol/week: 14.4 oz  
    Types: 24 Cans of beer per week  
    Comment: none since   Drug use: No  
  
    
Allergies Allergen Reactions  Augmentin [Amoxicillin-Pot Clavulanate] Itching  
  
  
     
Family History Problem Relation Age of Onset  Heart Attack Sister    
  
      
Current Outpatient Medications Medication Sig Dispense Refill  atorvastatin (LIPITOR) 40 mg tablet TK 1 T PO QHS   5  
 PROAIR HFA 90 mcg/actuation inhaler INHALE 2 PUFFS PO Q 4 H   0  
 mometasone-formoterol (DULERA) 100-5 mcg/actuation HFA inhaler Take 1 Puff by inhalation daily.      
 CALCIUM CARBONATE (CALCIUM 600 PO) Take  by mouth.      
 cholecalciferol (VITAMIN D3) 1,000 unit tablet Take  by mouth daily.      
 ibandronate (BONIVA) 150 mg tablet Take 150 mg by mouth every thirty (30) days.      
 amLODIPine (NORVASC) 5 mg tablet Take 5 mg by mouth daily.      
 meloxicam (MOBIC) 15 mg tablet 15 mg.      
 potassium chloride (KAON 20%) 40 mEq/15 mL liqd Take 22.5 mL by mouth daily. Indications: hypokalemia 480 mL 3  
  
PHYSICAL EXAMINATION:  
Visit Vitals /78 Ht 5' 1\" (1.549 m) Wt 133 lb (60.3 kg) BMI 25.13 kg/m² Constitutional: Well developed, well-nourished female in no acute distress. CV:  No peripheral swelling noted Respiratory: No respiratory distress or difficulties Abdomen:  Soft and nontender. No masses. No hepatosplenomegaly.  Female:  No CVA tenderness. Skin:  Normal color. No evidence of jaundice. Neuro/Psych:  Patient with appropriate affect. Alert and oriented. Lymphatic:   No enlargement of supraclavicular lymph nodes. 
  
REVIEW OF LABS AND IMAGING: 
     
Results for orders placed or performed in visit on 03/15/19 AMB POC URINALYSIS DIP STICK AUTO W/O MICRO Result Value Ref Range  
  Color (UA POC) Yellow    
  Clarity (UA POC) Clear    
  Glucose (UA POC) Negative Negative  
  Bilirubin (UA POC) Negative Negative  
  Ketones (UA POC) Negative Negative  
  Specific gravity (UA POC) 1.010 1.001 - 1.035  
  Blood (UA POC) 2+ Negative  
  pH (UA POC) 6.0 4.6 - 8.0  
  Protein (UA POC) 1+ Negative  
  Urobilinogen (UA POC) 0.2 mg/dL 0.2 - 1  
  Nitrites (UA POC) Negative Negative  
  Leukocyte esterase (UA POC) Negative Negative  
  
  
TURBT Surgical Pathology (07/12/2018): 
 A: BLADDER, POSTERIOR ASPECT, BIOPSY:  
PAPILLARY UROTHELIAL NEOPLASM OF LOW MALIGNANT POTENTIAL. B: BLADDER, LEFT LATERAL ASPECT, BIOPSY:  
DENUDED MUCOSA WITH MILD CHRONIC INFLAMMATION. C: BLADDER, RIGHT LATERAL ASPECT, BIOPSY:  
DENUDED MUCOSA WITH MILD CHRONIC INFLAMMATION. D: BLADDER, TRIGONE, BIOPSY:  
DENUDED MUCOSA WITH MILD CHRONIC INFLAMMATION. E: BLADDER, DOME, BIOPSY:  
PARTIALLY STRIPPED MUCOSA WITH FEATURES SUGGESTIVE OF A PAPILLARY  
UROTHELIAL NEOPLASM OF LOW MALIGNANT POTENTIAL. MILD CHRONIC AND ACUTE INFLAMMATION.   
  
TURBT Surgical Pathology (07/03/2017): FINAL DIAGNOSIS: 
A: BLADDER TUMOR, DOME, TRANSURETHRAL RESECTION: 
PAPILLARY UROTHELIAL CARCINOMA. TUMOR TYPE: NON-INVASIVE. ASSOCIATED EPITHELIAL LESIONS: NOT OBSERVED. HISTOLOGIC GRADE: HIGH GRADE. MUSCULARIS PROPRIA: NOT IDENTIFIED. LYMPHOVASCULAR SPACE INVASION: NOT IDENTIFIED. B: BLADDER, TRIGONE, BIOPSY: 
SQUAMOUS-TYPE MUCOSA WITH MILD CHRONIC INFLAMMATION. C: BLADDER, RIGHT LATERAL ASPECT, BIOPSY: 
SQUAMOUS-TYPE MUCOSA WITH MILD ACUTE AND CHRONIC INFLAMMATION. D: BLADDER, LEFT LATERAL ASPECT, BIOPSY: 
FIBROVASCULAR CONNECTIVE TISSUE WITH NO SIGNIFICANT HISTOPATHOLOGIC ABNORMALITIES. E: BLADDER, POSTERIOR ASPECT, BIOPSY: 
UROTHELIAL MUCOSA WITH SEVERE CHRONIC AND ACUTE INFLAMMATION. 
  
TURBT Surgical Pathology (01/24/2017): FINAL DIAGNOSIS:  
BLADDER TUMOR:  
NON-INVASIVE PAPILLARY UROTHELIAL CARCINOMA, HIGH GRADE.  
  
CT Abdomen and Pelvis (11/16/2016): Impression 1. New 1cm mass within the left aspect of the urinary bladder concerning for transitional cell carcinoma 2. No discrete evidence of pulmonary metastases 3. Diverticulosis without diverticulitis  
  
CT w/o Contrast (05/18/2016): IMPRESSION: 1.  Limited study secondary to the lack of contrast as well as motion and beam hardening artifact.  Small fat-containing left inguinal hernia with subtle protrusion of a loop of small bowel into the neck of the hernia without bowel wall thickening or other inflammatory changes.  However, there is also a small amount of simple fluid more distally within the left angle hernia.  This may correspond to the area of swelling and possible pain described clinically. 2.  Chronic appearing induration along the posterior midline soft tissues at the distal sacrococcygeal level suggesting chronic inflammation.  There is associated reactive sclerosis of the coccyx as well as to a lesser extent the most distal portion of the sacrum which may represent remodeling from chronic irritation versus chronic osteomyelitis.  No acute osseous abnormality. 3.  Severe spondylosis including dextroconvex scoliosis as well as levels of listhesis and foraminal/canal encroachment. 4.  Calcified fibroid uterus. 5.  Evidence of prior granulomatous exposure. 6.  Multiple bilateral cystic lesions, left larger than right including several mildly complex cyst with calcified septations in the lower pole the left kidney.  Consider further assessment with followup ultrasound. 7.  Mild anasarca. 8.  Howe catheter in place within a decompressed and thickwalled bladder.  Recommend correlation for cystitis. 9.  Small pleural effusions, left greater than right. 
  
A copy of today's office visit with all pertinent imaging results and labs were sent to the referring physician.   
Bharath Troncoso MD 
[facility] of [address], [address] [facility] Phone: [phone] Pager: [phone] History and physical review. The patient has been examined. There have been no significant clinical changes. NAD, CTAB, RRR 
 
NA Side marked Gent On call to OR Consented Plan to proceed with cysto, bladder biopsy, fulguration, b/l rpg Viet Pablo MD 
[facility] [address], [address] [facility] Phone: [phone] Pager: [phone]

## 2019-04-05 NOTE — ANESTHESIA POSTPROCEDURE EVALUATION
Procedure(s): 
CYSTOSCOPY, bladder biopsy, fulguration, bilateral retrograde pyelogram. 
 
general 
 
Anesthesia Post Evaluation Multimodal analgesia: multimodal analgesia used between 6 hours prior to anesthesia start to PACU discharge Patient location during evaluation: bedside Patient participation: complete - patient participated Level of consciousness: awake Pain score: 2 Pain management: adequate Airway patency: patent Anesthetic complications: no 
Cardiovascular status: stable Respiratory status: acceptable Hydration status: acceptable Post anesthesia nausea and vomiting:  none Vitals Value Taken Time /56 4/4/2019  1:36 PM  
Temp 36.9 °C (98.4 °F) 4/4/2019  1:36 PM  
Pulse 94 4/4/2019  1:48 PM  
Resp 19 4/4/2019  1:48 PM  
SpO2 96 % 4/4/2019  1:48 PM  
Vitals shown include unvalidated device data.

## 2019-04-05 NOTE — OP NOTES
[facility] DIVISION  OPERATIVE REPORT    Name:  Nathan Mason  MR#:   [id number]  :  1945  ACCOUNT #:  [de-identified]  DATE OF SERVICE:  2019    PREOPERATIVE DIAGNOSIS:  History of bladder cancer. POSTOPERATIVE DIAGNOSIS:  History of bladder cancer. PROCEDURE PERFORMED:  1. Cystoscopy. 2.  Bilateral retrograde pyelogram with intraoperative interpretation. 3.  Bladder mapping biopsies x5 with fulguration. SURGEON:  Renny Martinez MD    ASSISTANT:  None. ANESTHESIA:  General.    FLUIDS:  Crystalloid. COMPLICATIONS:  none. SPECIMENS REMOVED:  1. Posterior bladder wall. 2.  Right lateral wall. 3.  Left lateral wall. 4.  Dome. 5.  Trigone. DRAINS:  None. IMPLANTS:  none. ESTIMATED BLOOD LOSS:  Minimal.    INTRAOPERATIVE FINDINGS:  1. On cystourethroscopy, the patient had erythema throughout the bladder concerning for carcinoma in situ. 2.  Areas from all sections of the bladder with erythema were biopsied and sent for pathology. 3.  On left retrograde pyelogram with intraoperative interpretation, the patient had no hydronephrosis, filling defects, or extravasation. 4.  On right retrograde pyelogram with intraoperative interpretation, the patient had no hydronephrosis, filling defects, or extravasation. INDICATIONS:  The patient is a 57-year-old female with a history of high-grade noninvasive urothelial carcinoma of the bladder. She has previously undergone multiple rounds of BCG therapy and presents after screening cystoscopy revealed erythema concerning for carcinoma in situ. Risks, benefits, and alternatives were explained. Patient decided to proceed. PROCEDURE:  Patient was first identified in the holding area and taken back to the operating room. Perioperative antibiotics were given. Sequential compression devices were placed. Anesthesia was induced. Patient was placed in dorsal lithotomy position taking care to pad all pressure points.   Patient was prepped and draped in the usual sterile fashion. Time-out was performed. First, a 22-Polish rigid cystoscope was inserted into the bladder. Systematic cystoscopy was performed. Diffuse erythema noted throughout the bladder concerning for CIS. No papillary tumors noted. At this point, we used an 8-Polish cone tip catheter to perform a left retrograde pyelogram with intraoperative interpretation. We then used the same cone tip catheter to perform right retrograde pyelogram with intraoperative interpretation. Once this was done, we used a cup biopsy forceps to take five bladder biopsies from spots of erythema throughout the bladder. Once this was done, we fulgurated all areas. We filled and emptied the bladder multiple times. Hemostasis was assured. Bladder was drained. Case came to conclusion.         Miguelina Lynn MD      [id number]  D:  04/04/2019 13:15  T:  04/05/2019 12:59  JOB #:  [id number]

## 2019-07-11 ENCOUNTER — HOSPITAL ENCOUNTER (OUTPATIENT)
Dept: CT IMAGING | Age: 74
Discharge: HOME | End: 2019-07-11
Attending: INTERNAL MEDICINE
Payer: MEDICARE

## 2019-07-11 DIAGNOSIS — C34.31 PRIMARY MALIGNANT NEOPLASM OF BRONCHUS OF RIGHT LOWER LOBE (HCC): ICD-10-CM

## 2019-07-11 LAB — CREAT UR-MCNC: 1 MG/DL (ref 0.6–1.3)

## 2019-07-11 PROCEDURE — 74011636320 HC RX REV CODE- 636/320: Performed by: INTERNAL MEDICINE

## 2019-07-11 PROCEDURE — 82565 ASSAY OF CREATININE: CPT

## 2019-07-11 PROCEDURE — 71260 CT THORAX DX C+: CPT

## 2019-07-11 RX ADMIN — IOPAMIDOL 70 ML: 612 INJECTION, SOLUTION INTRAVENOUS at 17:30

## 2019-11-02 ENCOUNTER — HOSPITAL ENCOUNTER (OUTPATIENT)
Dept: CT IMAGING | Age: 74
Discharge: HOME | End: 2019-11-02
Attending: UROLOGY
Payer: MEDICARE

## 2019-11-02 DIAGNOSIS — C67.9 MALIGNANT NEOPLASM OF URINARY BLADDER, UNSPECIFIED SITE (HCC): ICD-10-CM

## 2019-11-02 LAB — CREAT UR-MCNC: 1.1 MG/DL (ref 0.6–1.3)

## 2019-11-02 PROCEDURE — 74178 CT ABD&PLV WO CNTR FLWD CNTR: CPT

## 2019-11-02 PROCEDURE — 82565 ASSAY OF CREATININE: CPT

## 2020-01-15 ENCOUNTER — HOSPITAL ENCOUNTER (OUTPATIENT)
Dept: CT IMAGING | Age: 75
Discharge: HOME | End: 2020-01-15
Attending: INTERNAL MEDICINE
Payer: MEDICARE

## 2020-01-15 DIAGNOSIS — C34.31 PRIMARY MALIGNANT NEOPLASM OF BRONCHUS OF RIGHT LOWER LOBE (HCC): ICD-10-CM

## 2020-01-15 LAB — CREAT UR-MCNC: 1.1 MG/DL (ref 0.6–1.3)

## 2020-01-15 PROCEDURE — 71260 CT THORAX DX C+: CPT

## 2020-01-15 PROCEDURE — 82565 ASSAY OF CREATININE: CPT

## 2020-01-15 PROCEDURE — 74011636320 HC RX REV CODE- 636/320: Performed by: INTERNAL MEDICINE

## 2020-01-15 RX ADMIN — IOPAMIDOL 75 ML: 612 INJECTION, SOLUTION INTRAVENOUS at 19:12

## 2020-02-17 ENCOUNTER — HOSPITAL ENCOUNTER (OUTPATIENT)
Dept: BONE DENSITY | Age: 75
Discharge: HOME | End: 2020-02-17
Attending: INTERNAL MEDICINE
Payer: MEDICARE

## 2020-02-17 DIAGNOSIS — M81.0 AGE-RELATED OSTEOPOROSIS WITHOUT CURRENT PATHOLOGICAL FRACTURE: ICD-10-CM

## 2020-02-17 PROCEDURE — 77080 DXA BONE DENSITY AXIAL: CPT

## 2020-07-16 ENCOUNTER — HOSPITAL ENCOUNTER (OUTPATIENT)
Dept: CT IMAGING | Age: 75
Discharge: HOME | End: 2020-07-16
Attending: INTERNAL MEDICINE
Payer: MEDICARE

## 2020-07-16 DIAGNOSIS — C34.31 MALIGNANT NEOPLASM OF LOWER LOBE OF RIGHT LUNG (HCC): ICD-10-CM

## 2020-07-16 LAB — CREAT UR-MCNC: 1 MG/DL (ref 0.6–1.3)

## 2020-07-16 PROCEDURE — 71260 CT THORAX DX C+: CPT

## 2020-07-16 PROCEDURE — 82565 ASSAY OF CREATININE: CPT

## 2020-07-16 PROCEDURE — 74011636320 HC RX REV CODE- 636/320: Performed by: INTERNAL MEDICINE

## 2020-07-16 RX ADMIN — IOPAMIDOL 80 ML: 612 INJECTION, SOLUTION INTRAVENOUS at 17:55

## 2020-08-11 ENCOUNTER — HOSPITAL ENCOUNTER (EMERGENCY)
Age: 75
Discharge: HOME | End: 2020-08-11
Attending: EMERGENCY MEDICINE
Payer: MEDICARE

## 2020-08-11 ENCOUNTER — APPOINTMENT (OUTPATIENT)
Dept: CT IMAGING | Age: 75
End: 2020-08-11
Attending: NURSE PRACTITIONER
Payer: MEDICARE

## 2020-08-11 VITALS
SYSTOLIC BLOOD PRESSURE: 136 MMHG | DIASTOLIC BLOOD PRESSURE: 83 MMHG | HEART RATE: 108 BPM | OXYGEN SATURATION: 97 % | TEMPERATURE: 97.8 F | RESPIRATION RATE: 21 BRPM

## 2020-08-11 DIAGNOSIS — K04.7 DENTAL ABSCESS: Primary | ICD-10-CM

## 2020-08-11 DIAGNOSIS — E07.9 THYROID LESION: ICD-10-CM

## 2020-08-11 DIAGNOSIS — E87.6 HYPOKALEMIA: ICD-10-CM

## 2020-08-11 LAB
ALBUMIN SERPL-MCNC: 3.6 G/DL (ref 3.4–5)
ALBUMIN/GLOB SERPL: 0.8 {RATIO} (ref 0.8–1.7)
ALP SERPL-CCNC: 116 U/L (ref 45–117)
ALT SERPL-CCNC: 24 U/L (ref 13–56)
ANION GAP SERPL CALC-SCNC: 5 MMOL/L (ref 3–18)
AST SERPL-CCNC: 25 U/L (ref 10–38)
BASOPHILS # BLD: 0 K/UL (ref 0–0.1)
BASOPHILS NFR BLD: 0 % (ref 0–2)
BILIRUB SERPL-MCNC: 0.7 MG/DL (ref 0.2–1)
BUN SERPL-MCNC: 18 MG/DL (ref 7–18)
BUN/CREAT SERPL: 17 (ref 12–20)
CALCIUM SERPL-MCNC: 9.6 MG/DL (ref 8.5–10.1)
CHLORIDE SERPL-SCNC: 98 MMOL/L (ref 100–111)
CO2 SERPL-SCNC: 32 MMOL/L (ref 21–32)
CREAT SERPL-MCNC: 1.08 MG/DL (ref 0.6–1.3)
DIFFERENTIAL METHOD BLD: ABNORMAL
EOSINOPHIL # BLD: 0 K/UL (ref 0–0.4)
EOSINOPHIL NFR BLD: 0 % (ref 0–5)
ERYTHROCYTE [DISTWIDTH] IN BLOOD BY AUTOMATED COUNT: 15 % (ref 11.6–14.5)
GLOBULIN SER CALC-MCNC: 4.7 G/DL (ref 2–4)
GLUCOSE SERPL-MCNC: 102 MG/DL (ref 74–99)
HCT VFR BLD AUTO: 45.7 % (ref 35–45)
HGB BLD-MCNC: 15 G/DL (ref 12–16)
LACTATE BLD-SCNC: 1.9 MMOL/L (ref 0.4–2)
LYMPHOCYTES # BLD: 1.5 K/UL (ref 0.9–3.6)
LYMPHOCYTES NFR BLD: 18 % (ref 21–52)
MAGNESIUM SERPL-MCNC: 2 MG/DL (ref 1.6–2.6)
MCH RBC QN AUTO: 31.2 PG (ref 24–34)
MCHC RBC AUTO-ENTMCNC: 32.8 G/DL (ref 31–37)
MCV RBC AUTO: 95 FL (ref 74–97)
MONOCYTES # BLD: 1 K/UL (ref 0.05–1.2)
MONOCYTES NFR BLD: 11 % (ref 3–10)
NEUTS SEG # BLD: 6 K/UL (ref 1.8–8)
NEUTS SEG NFR BLD: 71 % (ref 40–73)
PLATELET # BLD AUTO: 253 K/UL (ref 135–420)
PMV BLD AUTO: 9.3 FL (ref 9.2–11.8)
POTASSIUM SERPL-SCNC: 2.9 MMOL/L (ref 3.5–5.5)
PROT SERPL-MCNC: 8.3 G/DL (ref 6.4–8.2)
RBC # BLD AUTO: 4.81 M/UL (ref 4.2–5.3)
SODIUM SERPL-SCNC: 135 MMOL/L (ref 136–145)
WBC # BLD AUTO: 8.5 K/UL (ref 4.6–13.2)

## 2020-08-11 PROCEDURE — 96366 THER/PROPH/DIAG IV INF ADDON: CPT

## 2020-08-11 PROCEDURE — 74011250636 HC RX REV CODE- 250/636: Performed by: NURSE PRACTITIONER

## 2020-08-11 PROCEDURE — 96375 TX/PRO/DX INJ NEW DRUG ADDON: CPT

## 2020-08-11 PROCEDURE — 74011636320 HC RX REV CODE- 636/320: Performed by: EMERGENCY MEDICINE

## 2020-08-11 PROCEDURE — 99285 EMERGENCY DEPT VISIT HI MDM: CPT

## 2020-08-11 PROCEDURE — 80053 COMPREHEN METABOLIC PANEL: CPT

## 2020-08-11 PROCEDURE — 74011250637 HC RX REV CODE- 250/637: Performed by: NURSE PRACTITIONER

## 2020-08-11 PROCEDURE — 87040 BLOOD CULTURE FOR BACTERIA: CPT

## 2020-08-11 PROCEDURE — 83735 ASSAY OF MAGNESIUM: CPT

## 2020-08-11 PROCEDURE — 96365 THER/PROPH/DIAG IV INF INIT: CPT

## 2020-08-11 PROCEDURE — 96361 HYDRATE IV INFUSION ADD-ON: CPT

## 2020-08-11 PROCEDURE — 70491 CT SOFT TISSUE NECK W/DYE: CPT

## 2020-08-11 PROCEDURE — 85025 COMPLETE CBC W/AUTO DIFF WBC: CPT

## 2020-08-11 PROCEDURE — 83605 ASSAY OF LACTIC ACID: CPT

## 2020-08-11 RX ORDER — ACETAMINOPHEN 500 MG
1000 TABLET ORAL
Status: COMPLETED | OUTPATIENT
Start: 2020-08-11 | End: 2020-08-11

## 2020-08-11 RX ORDER — SODIUM CHLORIDE 0.9 % (FLUSH) 0.9 %
5-10 SYRINGE (ML) INJECTION AS NEEDED
Status: DISCONTINUED | OUTPATIENT
Start: 2020-08-11 | End: 2020-08-11 | Stop reason: HOSPADM

## 2020-08-11 RX ORDER — POTASSIUM CHLORIDE 750 MG/1
10 TABLET, FILM COATED, EXTENDED RELEASE ORAL DAILY
Qty: 7 TAB | Refills: 0 | Status: SHIPPED | OUTPATIENT
Start: 2020-08-11 | End: 2020-08-18

## 2020-08-11 RX ORDER — POTASSIUM CHLORIDE 20 MEQ/1
40 TABLET, EXTENDED RELEASE ORAL
Status: COMPLETED | OUTPATIENT
Start: 2020-08-11 | End: 2020-08-11

## 2020-08-11 RX ORDER — CLINDAMYCIN HYDROCHLORIDE 300 MG/1
300 CAPSULE ORAL 4 TIMES DAILY
Qty: 40 CAP | Refills: 0 | Status: SHIPPED | OUTPATIENT
Start: 2020-08-11 | End: 2020-08-21

## 2020-08-11 RX ORDER — POTASSIUM CHLORIDE 7.45 MG/ML
10 INJECTION INTRAVENOUS
Status: DISCONTINUED | OUTPATIENT
Start: 2020-08-11 | End: 2020-08-11 | Stop reason: CLARIF

## 2020-08-11 RX ORDER — POTASSIUM CHLORIDE 7.45 MG/ML
10 INJECTION INTRAVENOUS
Status: DISCONTINUED | OUTPATIENT
Start: 2020-08-11 | End: 2020-08-11 | Stop reason: SDUPTHER

## 2020-08-11 RX ORDER — POTASSIUM CHLORIDE 7.45 MG/ML
10 INJECTION INTRAVENOUS
Status: COMPLETED | OUTPATIENT
Start: 2020-08-11 | End: 2020-08-11

## 2020-08-11 RX ORDER — CLINDAMYCIN PHOSPHATE 900 MG/50ML
900 INJECTION, SOLUTION INTRAVENOUS
Status: COMPLETED | OUTPATIENT
Start: 2020-08-11 | End: 2020-08-11

## 2020-08-11 RX ADMIN — POTASSIUM CHLORIDE 40 MEQ: 1500 TABLET, EXTENDED RELEASE ORAL at 14:45

## 2020-08-11 RX ADMIN — CLINDAMYCIN PHOSPHATE 900 MG: 150 INJECTION, SOLUTION INTRAVENOUS at 13:48

## 2020-08-11 RX ADMIN — POTASSIUM CHLORIDE 10 MEQ: 10 INJECTION, SOLUTION INTRAVENOUS at 14:44

## 2020-08-11 RX ADMIN — IOPAMIDOL 80 ML: 612 INJECTION, SOLUTION INTRAVENOUS at 14:43

## 2020-08-11 RX ADMIN — POTASSIUM CHLORIDE 10 MEQ: 10 INJECTION, SOLUTION INTRAVENOUS at 16:20

## 2020-08-11 RX ADMIN — SODIUM CHLORIDE 722 ML: 900 INJECTION, SOLUTION INTRAVENOUS at 14:44

## 2020-08-11 RX ADMIN — ACETAMINOPHEN 1000 MG: 500 TABLET, FILM COATED ORAL at 13:46

## 2020-08-11 RX ADMIN — SODIUM CHLORIDE 1000 ML: 900 INJECTION, SOLUTION INTRAVENOUS at 13:47

## 2020-08-11 NOTE — DISCHARGE INSTRUCTIONS
Patient Education        Abscessed Tooth: Care Instructions  Your Care Instructions     An abscessed tooth is a tooth that has a pocket of pus in the tissues around it. Pus forms when the body tries to fight an infection caused by bacteria. If the pus cannot drain, it forms an abscess. An abscessed tooth can cause red, swollen gums and throbbing pain, especially when you chew. You may have a bad taste in your mouth and a fever, and your jaw may swell. Damage to the tooth, untreated tooth decay, or gum disease can cause an abscessed tooth. An abscessed tooth needs to be treated by a dental professional right away. If it is not treated, the infection could spread to other parts of your body. Your dentist will give you antibiotics to stop the infection. He or she may make a hole in the tooth or cut open (parris) the abscess inside your mouth so that the infection can drain, which should relieve your pain. You may need to have a root canal treatment, which tries to save your tooth by taking out the infected pulp and replacing it with a healing medicine and/or a filling. If these treatments do not work, your tooth may have to be removed. Follow-up care is a key part of your treatment and safety. Be sure to make and go to all appointments, and call your doctor if you are having problems. It's also a good idea to know your test results and keep a list of the medicines you take. How can you care for yourself at home? · Reduce pain and swelling in your face and jaw by putting ice or a cold pack on the outside of your cheek. Do this for 10 to 20 minutes at a time. Put a thin cloth between the ice and your skin. · Take pain medicines exactly as directed. ? If the doctor gave you a prescription medicine for pain, take it as prescribed. ? If you are not taking a prescription pain medicine, ask your doctor if you can take an over-the-counter medicine. · Take antibiotics as directed.  Do not stop taking them just because you feel better. You need to take the full course of antibiotics. To prevent tooth abscess  · Brush and floss every day. Have regular dental checkups. · Eat a healthy diet. Avoid sugary foods and drinks. · Do not smoke or vape with nicotine. And don't use spit tobacco. Tobacco and nicotine slow your ability to heal. They increase your risk for gum disease and cancer of the mouth and throat. If you need help quitting, talk to your doctor about stop-smoking programs and medicines. These can increase your chances of quitting for good. When should you call for help? [id number] anytime you think you may need emergency care. For example, call if:  · You have trouble breathing. Call your doctor now or seek immediate medical care if:  · You have new or worse symptoms of infection, such as:  ? Increased pain, swelling, warmth, or redness. ? Red streaks leading from the area. ? Pus draining from the area. ? A fever. Watch closely for changes in your health, and be sure to contact your doctor if:  · You do not get better as expected. Where can you learn more? Go to http://gabby-hiro.info/  Enter L466 in the search box to learn more about \"Abscessed Tooth: Care Instructions. \"  Current as of: March 25, 2020               Content Version: 12.5  © 9620-6593 [employer]. Care instructions adapted under license by [employer] (which disclaims liability or warranty for this information). If you have questions about a medical condition or this instruction, always ask your healthcare professional. [place] [address] any warranty or liability for your use of this information. Patient Education        Hypokalemia: Care Instructions  Your Care Instructions     Hypokalemia (say \"xl-hu-sex-LAUREN-tiffanie-uh\") is a low level of potassium. The heart, muscles, kidneys, and nervous system all need potassium to work well. This problem has many different causes.  Kidney problems, diet, and medicines like diuretics and laxatives can cause it. So can vomiting or diarrhea. In some cases, cancer is the cause. Your doctor may do tests to find the cause of your low potassium levels. You may need medicines to bring your potassium levels back to normal. You may also need regular blood tests to check your potassium. If you have very low potassium, you may need intravenous (IV) medicines. You also may need tests to check the electrical activity of your heart. Heart problems caused by low potassium levels can be very serious. Follow-up care is a key part of your treatment and safety. Be sure to make and go to all appointments, and call your doctor if you are having problems. It's also a good idea to know your test results and keep a list of the medicines you take. How can you care for yourself at home? · If your doctor recommends it, eat foods that have a lot of potassium. These include fresh fruits, juices, and vegetables. They also include nuts, beans, and milk. · Be safe with medicines. If your doctor prescribes medicines or potassium supplements, take them exactly as directed. Call your doctor if you have any problems with your medicines. · Get your potassium levels tested as often as your doctor tells you. When should you call for help? [id number] anytime you think you may need emergency care. For example, call if:  · You feel like your heart is missing beats. Heart problems caused by low potassium can cause death. · You passed out (lost consciousness). · You have a seizure. Call your doctor now or seek immediate medical care if:  · You feel weak or unusually tired. · You have severe arm or leg cramps. · You have tingling or numbness. · You feel sick to your stomach, or you vomit. · You have belly cramps. · You feel bloated or constipated. · You have to urinate a lot. · You feel very thirsty most of the time.   · You are dizzy or lightheaded, or you feel like you may faint.  · You feel depressed, or you lose touch with reality. Watch closely for changes in your health, and be sure to contact your doctor if:  · You do not get better as expected. Where can you learn more? Go to http://gabby-hiro.info/  Enter G358 in the search box to learn more about \"Hypokalemia: Care Instructions. \"  Current as of: July 29, 2019               Content Version: 12.5  © 7178-7105 [employer]. Care instructions adapted under license by [employer] (which disclaims liability or warranty for this information). If you have questions about a medical condition or this instruction, always ask your healthcare professional. [address] any warranty or liability for your use of this information.

## 2020-08-11 NOTE — ED TRIAGE NOTES
Patient c/o dental pain that's also causing pain in her neck. She states she has bad teeth on right lower jaw. She also states she was sick last with with no appetite and intermittent fever. She states appetite has improved now. She denies cough or SOB.

## 2020-08-11 NOTE — ED PROVIDER NOTES
EMERGENCY DEPARTMENT HISTORY AND PHYSICAL EXAM    12:46 PM      Date: 8/11/2020  Patient Name: Latrice Mahajan    History of Presenting Illness     Chief Complaint   Patient presents with    Dental Pain    Fever         History Provided By: Patient    Additional History (Context): Latrice Mahajan is a 66 y.o. female with past medical history significant for squamous cell skin cancer, lung cancer, hypertension, high cholesterol, COPD, and thyroid disease who presents with right lower dental pain with facial swelling and fever over the last week. Patient states she has a single remaining tooth to the right lower that has been decayed for a long time which is started bothering her over the last week and progressed to facial swelling. She states she has pain under her tongue and in the right lateral anterior aspect of her neck. PCP: Tiffanie Stevens MD    Current Facility-Administered Medications   Medication Dose Route Frequency Provider Last Rate Last Dose    sodium chloride (NS) flush 5-10 mL  5-10 mL IntraVENous PRN DIANA Arndt         Current Outpatient Medications   Medication Sig Dispense Refill    clindamycin (CLEOCIN) 300 mg capsule Take 1 Cap by mouth four (4) times daily for 10 days. 40 Cap 0    potassium chloride SR (KLOR-CON 10) 10 mEq tablet Take 1 Tab by mouth daily for 7 days. 7 Tab 0    diclofenac (VOLTAREN) 1 % gel Apply  to affected area four (4) times daily.  docusate sodium (COLACE) 100 mg capsule Take 100 mg by mouth.  traMADol (ULTRAM) 50 mg tablet TK 1 TO 2 TS PO QID PRN P  0    meloxicam (MOBIC) 15 mg tablet 15 mg.      atorvastatin (LIPITOR) 40 mg tablet TK 1 T PO QHS  5    PROAIR HFA 90 mcg/actuation inhaler INHALE 2 PUFFS PO Q 4 H  0    mometasone-formoterol (DULERA) 100-5 mcg/actuation HFA inhaler Take 1 Puff by inhalation daily.  CALCIUM CARBONATE (CALCIUM 600 PO) Take  by mouth.       cholecalciferol (VITAMIN D3) 1,000 unit tablet Take  by mouth daily.      ibandronate (BONIVA) 150 mg tablet Take 150 mg by mouth every thirty (30) days.  amLODIPine (NORVASC) 5 mg tablet Take 5 mg by mouth daily. Past History     Past Medical History:  Past Medical History:   Diagnosis Date    Arthritis     Knees and left shoulder    Cancer ([facility]     squamous cell CA skin    Chronic obstructive pulmonary disease (HCC)     High cholesterol     Hypertension     Lung cancer ([facility] 2014    Malignant neoplasm of bladder ([facility] 2017    High grade TCC, s/p TURBT small +Mitomycin-C instillation -- Dr. Hahn Sample Mass of bladder     Pulmonary nodule 14    Radiation     Recurrent UTI     Thyroid disease        Past Surgical History:  Past Surgical History:   Procedure Laterality Date    CHEST SURGERY PROCEDURE UNLISTED      Radiosurgery    HX APPENDECTOMY      HX COLONOSCOPY      HX CYST REMOVAL Left     Breast    HX GYN      hysterectomy    HX OTHER SURGICAL      skin cancer removed.  HX OTHER SURGICAL      biopsy on thyroid     HX TUBAL LIGATION      HX UROLOGICAL  2017    Cysto, TURBT small, Bladder mapping biopsies with fulguration -- [facility] Levi [facility] History:  Family History   Problem Relation Age of Onset    Heart Attack Sister        Social History:  Social History     Tobacco Use    Smoking status: Former Smoker     Packs/day: 0.50     Years: 40.00     Pack years: 20.00     Last attempt to quit: 2007     Years since quittin.5    Smokeless tobacco: Never Used   Substance Use Topics    Alcohol use: Yes     Alcohol/week: 1.0 - 2.0 standard drinks     Types: 1 - 2 Cans of beer per week     Comment: on a weekend. none since 2019    Drug use: No       Allergies: Allergies   Allergen Reactions    Augmentin [Amoxicillin-Pot Clavulanate] Itching         Review of Systems       Review of Systems   Constitutional: Positive for fever. Negative for chills.    HENT: Positive for dental problem and facial swelling. Negative for congestion, drooling, ear discharge, ear pain, hearing loss, mouth sores, nosebleeds, postnasal drip, rhinorrhea, sinus pressure, sinus pain, sneezing, sore throat, tinnitus, trouble swallowing and voice change. Eyes: Negative. Negative for pain and redness. Respiratory: Negative. Negative for cough and shortness of breath. Cardiovascular: Negative. Negative for chest pain, palpitations and leg swelling. Gastrointestinal: Negative. Negative for abdominal pain, constipation, diarrhea, nausea and vomiting. Genitourinary: Negative. Negative for dysuria, frequency, hematuria and urgency. Musculoskeletal: Positive for neck pain. Negative for back pain, gait problem and joint swelling. Skin: Negative. Negative for rash and wound. Neurological: Negative. Negative for dizziness, seizures, speech difficulty, weakness, light-headedness and headaches. Hematological: Negative for adenopathy. Does not bruise/bleed easily. All other systems reviewed and are negative. Physical Exam     Visit Vitals  /83   Pulse (!) 108   Temp 97.8 °F (36.6 °C)   Resp 21   SpO2 97%         Physical Exam  Vitals signs and nursing note reviewed. Constitutional:       General: She is not in acute distress. Appearance: Normal appearance. She is not ill-appearing, toxic-appearing or diaphoretic. HENT:      Head: Normocephalic and atraumatic. Jaw: No trismus. Nose: Nose normal.      Mouth/Throat:      Mouth: Mucous membranes are moist.      Dentition: Abnormal dentition. Does not have dentures. Dental tenderness and dental caries present. No gingival swelling, dental abscesses or gum lesions. Tongue: Tongue does not deviate from midline. Palate: No mass. Pharynx: Oropharynx is clear. Uvula midline. No oropharyngeal exudate, posterior oropharyngeal erythema or uvula swelling. Tonsils: No tonsillar exudate or tonsillar abscesses. Comments: Swelling to the right sublingual space of the tongue and under the submandibular aspect of the right side of the jaw. Eyes:      General: Lids are normal. Vision grossly intact. Conjunctiva/sclera: Conjunctivae normal.      Pupils: Pupils are equal, round, and reactive to light. Neck:      Musculoskeletal: Full passive range of motion without pain, normal range of motion and neck supple. Muscular tenderness (To the right anterior and lateral aspect of the neck) present. Trachea: Phonation normal.   Cardiovascular:      Rate and Rhythm: Regular rhythm. Tachycardia present. Pulses: Normal pulses. Heart sounds: Normal heart sounds. No murmur. No friction rub. No gallop. Pulmonary:      Effort: Pulmonary effort is normal. No respiratory distress. Breath sounds: Normal breath sounds. No wheezing or rales. Abdominal:      General: Bowel sounds are normal.      Palpations: Abdomen is soft. Tenderness: There is no abdominal tenderness. Musculoskeletal: Normal range of motion. Lymphadenopathy:      Cervical: No cervical adenopathy. Skin:     General: Skin is warm and dry. Capillary Refill: Capillary refill takes less than 2 seconds. Neurological:      General: No focal deficit present. Mental Status: She is alert and oriented to person, place, and time. Psychiatric:         Mood and Affect: Mood normal.         Behavior: Behavior normal. Behavior is cooperative.            Diagnostic Study Results     Labs -  Recent Results (from the past 12 hour(s))   POC LACTIC ACID    Collection Time: 08/11/20  1:32 PM   Result Value Ref Range    Lactic Acid (POC) 1.90 0.40 - 2.00 mmol/L   CBC WITH AUTOMATED DIFF    Collection Time: 08/11/20  1:33 PM   Result Value Ref Range    WBC 8.5 4.6 - 13.2 K/uL    RBC 4.81 4.20 - 5.30 M/uL    HGB 15.0 12.0 - 16.0 g/dL    HCT 45.7 (H) 35.0 - 45.0 %    MCV 95.0 74.0 - 97.0 FL    MCH 31.2 24.0 - 34.0 PG    MCHC 32.8 31.0 - 37.0 g/dL RDW 15.0 (H) 11.6 - 14.5 %    PLATELET 859 224 - 453 K/uL    MPV 9.3 9.2 - 11.8 FL    NEUTROPHILS 71 40 - 73 %    LYMPHOCYTES 18 (L) 21 - 52 %    MONOCYTES 11 (H) 3 - 10 %    EOSINOPHILS 0 0 - 5 %    BASOPHILS 0 0 - 2 %    ABS. NEUTROPHILS 6.0 1.8 - 8.0 K/UL    ABS. LYMPHOCYTES 1.5 0.9 - 3.6 K/UL    ABS. MONOCYTES 1.0 0.05 - 1.2 K/UL    ABS. EOSINOPHILS 0.0 0.0 - 0.4 K/UL    ABS. BASOPHILS 0.0 0.0 - 0.1 K/UL    DF AUTOMATED     METABOLIC PANEL, COMPREHENSIVE    Collection Time: 08/11/20  1:33 PM   Result Value Ref Range    Sodium 135 (L) 136 - 145 mmol/L    Potassium 2.9 (LL) 3.5 - 5.5 mmol/L    Chloride 98 (L) 100 - 111 mmol/L    CO2 32 21 - 32 mmol/L    Anion gap 5 3.0 - 18 mmol/L    Glucose 102 (H) 74 - 99 mg/dL    BUN 18 7.0 - 18 MG/DL    Creatinine 1.08 0.6 - 1.3 MG/DL    BUN/Creatinine ratio 17 12 - 20      GFR est AA 60 (L) >60 ml/min/1.73m2    GFR est non-AA 49 (L) >60 ml/min/1.73m2    Calcium 9.6 8.5 - 10.1 MG/DL    Bilirubin, total 0.7 0.2 - 1.0 MG/DL    ALT (SGPT) 24 13 - 56 U/L    AST (SGOT) 25 10 - 38 U/L    Alk. phosphatase 116 45 - 117 U/L    Protein, total 8.3 (H) 6.4 - 8.2 g/dL    Albumin 3.6 3.4 - 5.0 g/dL    Globulin 4.7 (H) 2.0 - 4.0 g/dL    A-G Ratio 0.8 0.8 - 1.7     MAGNESIUM    Collection Time: 08/11/20  1:33 PM   Result Value Ref Range    Magnesium 2.0 1.6 - 2.6 mg/dL       Radiologic Studies -   CT NECK SOFT TISSUE W CONT   Final Result   IMPRESSION:       1. Near edentulous. The right mandibular incisor is out of the mandibular   socket. The adjacent right mandibular tricuspid shows moderate periapical   abscess. Moderate adjacent soft tissue edema also noted but no soft tissue   abscess seen. 2. Large mucous retention cyst occupied the near entire right maxillary sinus. No sinusitis seen. 3. Reactive small lymph nodes in right submandibular region with no   lymphadenopathy.       4. Enlarged left lobe of the thyroid with retrosternal extension and containing   multiple complex hypodense lesions. Recommend thyroid ultrasound for better   evaluation. 5. The bilateral submandibular glands are not well seen, probably atrophic? Thank you for your referral.             Medical Decision Making   I am the first provider for this patient. I reviewed available nursing notes, past medical history, past surgical history, family history and social history. Vital Signs-Reviewed the patient's vital signs. Records Reviewed: Nursing Notes and Old Medical Records (Time of Review: 12:46 PM)    Pulse Oximetry Analysis - 95% on room airnormal    Cardiac Monitor:  Rate: 108   Rhythm: Sinus tach    ED Course: Progress Notes, Reevaluation, and Consults:  12:46 PM  Initial assessment performed. The patients presenting problems have been discussed, and they/their family are in agreement with the care plan formulated and outlined with them. I have encouraged them to ask questions as they arise throughout their visit. 2:00 PM sepsis reassessment complete. Patient receiving IV antibiotics and IV fluids are completed. She is still mildly tachycardic with a heart rate of 108on previous office visits and ER visits patient is always tachycardic between 101 10. When questioned about this she states she has had this worked up by cardiology. Provider Notes (Medical Decision Making):     Differential diagnosis includes: Ping's angina, periapical abscess, pulpitis, odontogenic infection, sinusitis, trauma, alveolar osteitis, necrotizing gingivitis, retropharyngeal abscess, peritonsillar abscess, metastatic disease. Patient presents ambulatory, no acute distress. Patient is well-hydrated and nontoxic in appearance. She is initially tachycardic and found to have a low-grade fever of 100.7 sirs criteria met so the ED 3-hour sepsis bundle was initiated.   On physical examination patient does have a very loose and decayed tooth to the right lower mandibular region and other teeth are all absent on that side. She does have soreness to the sublingual space on the right with mild swelling and there is some facial submandibular swelling as well. There is appropriate tenderness on palpation to the tooth without an obvious abscess. Patient has no trismus or stridor. Normal speech. Handling oral secretions without difficulty. Patient has full range of motion of the neck. Concern for deep space infection and Matthew's angina labs and a CT scan of the neck will be obtained. Will disposition after reassessment assuming no clinical change or worsening and appropriate response to symptomatic treatment. Patient was started empirically on clindamycin. Her CBC shows no leukocytosis or anemia. CMP shows a sodium of 135, critical potassium of 2.9, normal magnesium with normal renal and hepatic function. Potassium was replaced with 40 MEQ of potassium chloride orally and 20 MEQ IV. Lactic acid was normal at 1.8. CT of the neck shows the right mandibular incisor and tricuspid have moderate periapical abscess with moderate adjacent soft tissue edema with no abscess. There is a large mucous retention cyst in the right maxillary sinus. There are small reactive lymph nodes in the right submandibular region with no adenopathy. Incidentally, the left lobe of the thyroid is enlarged with retrosternal extension and containing multiple complex hypodense lesions. I spoke in depth with patient about this and she is aware of her thyroid lesion and is seeing endocrinology. The recommendation was made to have an ultrasound to better evaluate this and she is aware and will speak to her endocrinologist.    At this time, patient is deemed stable for continuation of antibiotics outpatient. She is afebrile and feeling much better. She will be discharged home with continuation of clindamycin and follow-up with an oral surgeon.   She was given strict ER return precautions including fever, trouble swallowing, neck pain/redness/swelling, or any new concerns. She is comfortable with the plan and is feeling much better and ready to go home. Diagnosis     Clinical Impression:   1. Dental abscess    2. Thyroid lesion    3. Hypokalemia        Disposition: Discharged home in stable condition    DISCHARGE NOTE:     Patient has been reexamined. Patient has no new complaints, changes, or physical findings. Care plan outlined and precautions discussed. Results of CT and labs were reviewed with the patient. All medications were reviewed with the patient; will discharge home with clindamycin and potassium chloride. All of patient's questions and concerns were addressed. Patient was instructed and agrees to follow up with oral surgery, as well as to return to the ED upon further deterioration. Patient is ready to go home. Follow-up Information     Follow up With Specialties Details Why Contact Info    Josefina Moscoso MD Oral Surgery Schedule an appointment as soon as possible for a visit Follow-up from the Emergency Department [address] [place] [address]  [phone] [id number] [facility] EMERGENCY DEPT Emergency Medicine  As needed, If symptoms worsen [address] [place] [address]  [phone]           Current Discharge Medication List      START taking these medications    Details   clindamycin (CLEOCIN) 300 mg capsule Take 1 Cap by mouth four (4) times daily for 10 days. Qty: 40 Cap, Refills: 0      potassium chloride SR (KLOR-CON 10) 10 mEq tablet Take 1 Tab by mouth daily for 7 days. Qty: 7 Tab, Refills: 0               Dictation disclaimer:  Please note that this dictation was completed with uBeam, the computer voice recognition software. Quite often unanticipated grammatical, syntax, homophones, and other interpretive errors are inadvertently transcribed by the computer software. Please disregard these errors. Please excuse any errors that have escaped final proofreading.

## 2020-08-17 LAB
BACTERIA SPEC CULT: NORMAL
BACTERIA SPEC CULT: NORMAL
SERVICE CMNT-IMP: NORMAL
SERVICE CMNT-IMP: NORMAL

## 2020-08-28 ENCOUNTER — HOSPITAL ENCOUNTER (OUTPATIENT)
Dept: CT IMAGING | Age: 75
Discharge: HOME | End: 2020-08-28
Attending: UROLOGY
Payer: MEDICARE

## 2020-08-28 DIAGNOSIS — C67.9 MALIGNANT NEOPLASM OF URINARY BLADDER, UNSPECIFIED SITE (HCC): ICD-10-CM

## 2020-08-28 LAB — CREAT UR-MCNC: 1.6 MG/DL (ref 0.6–1.3)

## 2020-08-28 PROCEDURE — 74011000636 HC RX REV CODE- 636: Performed by: UROLOGY

## 2020-08-28 PROCEDURE — 74178 CT ABD&PLV WO CNTR FLWD CNTR: CPT

## 2020-08-28 PROCEDURE — 82565 ASSAY OF CREATININE: CPT

## 2020-08-28 RX ADMIN — IOPAMIDOL 72 ML: 755 INJECTION, SOLUTION INTRAVENOUS at 20:43

## 2021-01-18 ENCOUNTER — HOSPITAL ENCOUNTER (OUTPATIENT)
Dept: CT IMAGING | Age: 76
Discharge: HOME | End: 2021-01-18
Attending: INTERNAL MEDICINE
Payer: MEDICARE

## 2021-01-18 DIAGNOSIS — C34.31 SQUAMOUS CELL CARCINOMA OF BRONCHUS IN RIGHT LOWER LOBE (HCC): ICD-10-CM

## 2021-01-18 PROCEDURE — 71260 CT THORAX DX C+: CPT

## 2021-01-18 PROCEDURE — 74011000636 HC RX REV CODE- 636: Performed by: INTERNAL MEDICINE

## 2021-01-18 RX ADMIN — IOPAMIDOL 65 ML: 612 INJECTION, SOLUTION INTRAVENOUS at 17:45

## 2021-01-25 ENCOUNTER — TRANSCRIBE ORDER (OUTPATIENT)
Dept: SCHEDULING | Age: 76
End: 2021-01-25

## 2021-01-25 DIAGNOSIS — C34.31 MALIGNANT NEOPLASM OF LOWER LOBE OF RIGHT LUNG (HCC): Primary | ICD-10-CM

## 2021-07-26 ENCOUNTER — HOSPITAL ENCOUNTER (OUTPATIENT)
Dept: CT IMAGING | Age: 76
Discharge: HOME | End: 2021-07-26
Attending: INTERNAL MEDICINE
Payer: MEDICARE

## 2021-07-26 DIAGNOSIS — C34.31 MALIGNANT NEOPLASM OF LOWER LOBE OF RIGHT LUNG (HCC): ICD-10-CM

## 2021-07-26 LAB — CREAT UR-MCNC: 1.1 MG/DL (ref 0.6–1.3)

## 2021-07-26 PROCEDURE — 71260 CT THORAX DX C+: CPT

## 2021-07-26 PROCEDURE — 74011000636 HC RX REV CODE- 636: Performed by: INTERNAL MEDICINE

## 2021-07-26 PROCEDURE — 82565 ASSAY OF CREATININE: CPT

## 2021-07-26 RX ADMIN — IOPAMIDOL 65 ML: 612 INJECTION, SOLUTION INTRAVENOUS at 18:40

## 2021-08-02 ENCOUNTER — TRANSCRIBE ORDER (OUTPATIENT)
Dept: SCHEDULING | Age: 76
End: 2021-08-02

## 2021-08-02 DIAGNOSIS — C34.31 PRIMARY MALIGNANT NEOPLASM OF BRONCHUS OF RIGHT LOWER LOBE (HCC): Primary | ICD-10-CM

## 2021-11-29 NOTE — PERIOP NOTES
PRE-SURGICAL INSTRUCTIONS        Patient's Name:  Britton Alpers      UCIVX'D Date:  11/29/2021            Covid Testing Date and Time:    Surgery Date:  12/6/2021                1. Do NOT eat or drink anything, including candy, gum, or ice chips after midnight on 12/5/2021, unless you have specific instructions from your surgeon or anesthesia provider to do so.  2. You may brush your teeth before coming to the hospital.  3. No smoking 24 hours prior to the day of surgery. 4. No alcohol 24 hours prior to the day of surgery. 5. No recreational drugs for one week prior to the day of surgery. 6. Leave all valuables, including money/purse, at home. 7. Remove all jewelry, nail polish, acrylic nails, and makeup (including mascara); no lotions powders, deodorant, or perfume/cologne/after shave on the skin. 8. Follow instruction for Hibiclens washes and CHG wipes from surgeon's office. 9. Glasses/contact lenses and dentures may be worn to the hospital.  They will be removed prior to surgery. 10. Call your doctor if symptoms of a cold or illness develop within 24-48 hours prior to your surgery. 11.  If you are having an outpatient procedure, please make arrangements for a responsible ADULT TO [address] [place] [address] and stay with you for 24 hours after your surgery. 12. ONE VISITOR in the hospital at this time for outpatient procedures. Exceptions may be made for surgical admissions, per nursing unit guidelines      Special Instructions:      Bring list of CURRENT medications. Bring COVID vaccination card  Bring any pertinent legal medical records. Take these medications the morning of surgery with a sip of water:Blood Pressure Medication, as directed by physician. Follow physician instructions about stopping Mobic  Complete bowel prep per MD instructions. On the day of surgery, come in the main entrance of [facility]. Let the  at the desk know you are there for surgery.   A staff member will come escort you to the surgical area on the second floor. If you have any questions or concerns, please do not hesitate to call:     (Prior to the day of surgery) PAT department:  [phone]   (Day of surgery) Pre-Op department:  [phone]    These surgical instructions were reviewed with patient during the PAT phone call.

## 2021-12-03 ENCOUNTER — ANESTHESIA EVENT (OUTPATIENT)
Dept: ENDOSCOPY | Age: 76
End: 2021-12-03
Payer: MEDICARE

## 2021-12-06 ENCOUNTER — ANESTHESIA (OUTPATIENT)
Dept: ENDOSCOPY | Age: 76
End: 2021-12-06
Payer: MEDICARE

## 2021-12-06 ENCOUNTER — HOSPITAL ENCOUNTER (OUTPATIENT)
Age: 76
Setting detail: OUTPATIENT SURGERY
Discharge: HOME | End: 2021-12-06
Attending: INTERNAL MEDICINE | Admitting: INTERNAL MEDICINE
Payer: MEDICARE

## 2021-12-06 VITALS
WEIGHT: 110.2 LBS | RESPIRATION RATE: 16 BRPM | TEMPERATURE: 97.5 F | HEIGHT: 61 IN | HEART RATE: 100 BPM | BODY MASS INDEX: 20.81 KG/M2 | SYSTOLIC BLOOD PRESSURE: 128 MMHG | DIASTOLIC BLOOD PRESSURE: 76 MMHG | OXYGEN SATURATION: 100 %

## 2021-12-06 PROCEDURE — 77030008565 HC TBNG SUC IRR ERBE -B: Performed by: INTERNAL MEDICINE

## 2021-12-06 PROCEDURE — 76040000007: Performed by: INTERNAL MEDICINE

## 2021-12-06 PROCEDURE — 77030021593 HC FCPS BIOP ENDOSC BSC -A: Performed by: INTERNAL MEDICINE

## 2021-12-06 PROCEDURE — 99100 ANES PT EXTEME AGE<1 YR&>70: CPT | Performed by: ANESTHESIOLOGY

## 2021-12-06 PROCEDURE — 88305 TISSUE EXAM BY PATHOLOGIST: CPT

## 2021-12-06 PROCEDURE — 74011000250 HC RX REV CODE- 250: Performed by: NURSE ANESTHETIST, CERTIFIED REGISTERED

## 2021-12-06 PROCEDURE — 76060000032 HC ANESTHESIA 0.5 TO 1 HR: Performed by: INTERNAL MEDICINE

## 2021-12-06 PROCEDURE — 2709999900 HC NON-CHARGEABLE SUPPLY: Performed by: INTERNAL MEDICINE

## 2021-12-06 PROCEDURE — 99100 ANES PT EXTEME AGE<1 YR&>70: CPT | Performed by: NURSE ANESTHETIST, CERTIFIED REGISTERED

## 2021-12-06 PROCEDURE — 77030037712 HC SYS DEL CAP USEN -B: Performed by: INTERNAL MEDICINE

## 2021-12-06 PROCEDURE — 00813 ANES UPR LWR GI NDSC PX: CPT | Performed by: ANESTHESIOLOGY

## 2021-12-06 PROCEDURE — 74011250636 HC RX REV CODE- 250/636: Performed by: NURSE ANESTHETIST, CERTIFIED REGISTERED

## 2021-12-06 PROCEDURE — 00813 ANES UPR LWR GI NDSC PX: CPT | Performed by: NURSE ANESTHETIST, CERTIFIED REGISTERED

## 2021-12-06 PROCEDURE — 88312 SPECIAL STAINS GROUP 1: CPT

## 2021-12-06 RX ORDER — SODIUM CHLORIDE, SODIUM LACTATE, POTASSIUM CHLORIDE, CALCIUM CHLORIDE 600; 310; 30; 20 MG/100ML; MG/100ML; MG/100ML; MG/100ML
25 INJECTION, SOLUTION INTRAVENOUS CONTINUOUS
Status: CANCELLED | OUTPATIENT
Start: 2021-12-06

## 2021-12-06 RX ORDER — PROPOFOL 10 MG/ML
INJECTION, EMULSION INTRAVENOUS AS NEEDED
Status: DISCONTINUED | OUTPATIENT
Start: 2021-12-06 | End: 2021-12-06 | Stop reason: HOSPADM

## 2021-12-06 RX ORDER — SODIUM CHLORIDE 0.9 % (FLUSH) 0.9 %
5-40 SYRINGE (ML) INJECTION EVERY 8 HOURS
Status: CANCELLED | OUTPATIENT
Start: 2021-12-06

## 2021-12-06 RX ORDER — SODIUM CHLORIDE 0.9 % (FLUSH) 0.9 %
5-40 SYRINGE (ML) INJECTION AS NEEDED
Status: CANCELLED | OUTPATIENT
Start: 2021-12-06

## 2021-12-06 RX ORDER — SODIUM CHLORIDE 9 MG/ML
25 INJECTION, SOLUTION INTRAVENOUS CONTINUOUS
Status: DISCONTINUED | OUTPATIENT
Start: 2021-12-06 | End: 2021-12-07 | Stop reason: HOSPADM

## 2021-12-06 RX ORDER — LIDOCAINE HYDROCHLORIDE 20 MG/ML
INJECTION, SOLUTION EPIDURAL; INFILTRATION; INTRACAUDAL; PERINEURAL AS NEEDED
Status: DISCONTINUED | OUTPATIENT
Start: 2021-12-06 | End: 2021-12-06 | Stop reason: HOSPADM

## 2021-12-06 RX ORDER — SODIUM CHLORIDE 0.9 % (FLUSH) 0.9 %
5-40 SYRINGE (ML) INJECTION EVERY 8 HOURS
Status: DISCONTINUED | OUTPATIENT
Start: 2021-12-06 | End: 2021-12-07 | Stop reason: HOSPADM

## 2021-12-06 RX ORDER — LIDOCAINE HYDROCHLORIDE 10 MG/ML
0.1 INJECTION, SOLUTION EPIDURAL; INFILTRATION; INTRACAUDAL; PERINEURAL AS NEEDED
Status: DISCONTINUED | OUTPATIENT
Start: 2021-12-06 | End: 2021-12-07 | Stop reason: HOSPADM

## 2021-12-06 RX ORDER — SODIUM CHLORIDE 0.9 % (FLUSH) 0.9 %
5-40 SYRINGE (ML) INJECTION AS NEEDED
Status: DISCONTINUED | OUTPATIENT
Start: 2021-12-06 | End: 2021-12-07 | Stop reason: HOSPADM

## 2021-12-06 RX ADMIN — PROPOFOL 30 MG: 10 INJECTION, EMULSION INTRAVENOUS at 14:19

## 2021-12-06 RX ADMIN — LIDOCAINE HYDROCHLORIDE 100 MG: 20 INJECTION, SOLUTION EPIDURAL; INFILTRATION; INTRACAUDAL; PERINEURAL at 14:03

## 2021-12-06 RX ADMIN — PROPOFOL 70 MG: 10 INJECTION, EMULSION INTRAVENOUS at 13:52

## 2021-12-06 RX ADMIN — FAMOTIDINE 20 MG: 10 INJECTION INTRAVENOUS at 13:29

## 2021-12-06 RX ADMIN — PROPOFOL 30 MG: 10 INJECTION, EMULSION INTRAVENOUS at 13:54

## 2021-12-06 RX ADMIN — PROPOFOL 30 MG: 10 INJECTION, EMULSION INTRAVENOUS at 14:03

## 2021-12-06 RX ADMIN — PROPOFOL 30 MG: 10 INJECTION, EMULSION INTRAVENOUS at 14:00

## 2021-12-06 RX ADMIN — PROPOFOL 30 MG: 10 INJECTION, EMULSION INTRAVENOUS at 13:57

## 2021-12-06 RX ADMIN — PROPOFOL 30 MG: 10 INJECTION, EMULSION INTRAVENOUS at 14:06

## 2021-12-06 RX ADMIN — PROPOFOL 30 MG: 10 INJECTION, EMULSION INTRAVENOUS at 14:10

## 2021-12-06 RX ADMIN — SODIUM CHLORIDE 25 ML/HR: 900 INJECTION, SOLUTION INTRAVENOUS at 13:29

## 2021-12-06 RX ADMIN — PROPOFOL 30 MG: 10 INJECTION, EMULSION INTRAVENOUS at 14:15

## 2021-12-06 NOTE — ANESTHESIA POSTPROCEDURE EVALUATION
Procedure(s):  UPPER ENDOSCOPY with Bx's  COLONOSCOPY. MAC    Anesthesia Post Evaluation      Multimodal analgesia: multimodal analgesia used between 6 hours prior to anesthesia start to PACU discharge  Patient location during evaluation: bedside  Patient participation: complete - patient participated  Level of consciousness: awake  Pain management: adequate  Airway patency: patent  Anesthetic complications: no  Cardiovascular status: stable  Respiratory status: acceptable  Hydration status: acceptable  Post anesthesia nausea and vomiting:  controlled      INITIAL Post-op Vital signs:   Vitals Value Taken Time   /67 12/06/21 1451   Temp 36.1 °C (97 °F) 12/06/21 1432   Pulse 95 12/06/21 1459   Resp 19 12/06/21 1459   SpO2 100 % 12/06/21 1459   Vitals shown include unvalidated device data.

## 2021-12-06 NOTE — ANESTHESIA PREPROCEDURE EVALUATION
Relevant Problems   No relevant active problems       Anesthetic History               Review of Systems / Medical History  Patient summary reviewed and pertinent labs reviewed    Pulmonary    COPD: moderate               Neuro/Psych              Cardiovascular    Hypertension: well controlled              Exercise tolerance: <4 METS     GI/Hepatic/Renal         Renal disease       Endo/Other      Hypothyroidism: well controlled  Arthritis and cancer     Other Findings              Physical Exam    Airway  Mallampati: III  TM Distance: 4 - 6 cm  Neck ROM: decreased range of motion   Mouth opening: Diminished (comment)     Cardiovascular    Rhythm: regular  Rate: normal         Dental    Dentition: Implants, Caps/crowns and Poor dentition     Pulmonary  Breath sounds clear to auscultation               Abdominal  GI exam deferred       Other Findings            Anesthetic Plan    ASA: 3  Anesthesia type: MAC          Induction: Intravenous  Anesthetic plan and risks discussed with: Patient

## 2021-12-06 NOTE — PROGRESS NOTES
[email or web address]  [place], [address]      Brief Procedure Note    [place]  1945  [id number]    Date of Procedure: 12/6/2021    Preoperative diagnosis: Colon cancer Screening:  V76.51 - Z12.11  Unexplained weight loss:  783.21 - R63.4  COPD (chronic obstructive pulmonary disease):  J44.9    Postoperative diagnosis: Endo: Duodenitis, Gastritis, Esophagitis, Esophageal Web, Hiatal Hernia  [place]: Severe Diverticulosis, Hemorrhoids    Type of Anesthesia: MAC (Monitored anesthesia care)    Description of findings: same as post op dx    Procedure: Procedure(s):  UPPER ENDOSCOPY with Bx's  COLONOSCOPY    :  Dr. Sudeep Lewis MD    Assistant(s): Endoscopy Technician-1: Tg River  Endoscopy RN-1: Gordon Burt RN    Devices/implants/grafts/tissues/prosthesis: None    EBL:None    Specimens:   ID Type Source Tests Collected by Time Destination   1 : Duodenum Bx's Preservative Duodenum  Benjamín Kemp MD 12/6/2021 1357 Pathology   2 : Gastric Bx's Preservative Stomach  Benjamín Kemp MD 12/6/2021 1357 Pathology   3 : Esophageal Ulcer Bx's Preservative Esophagus  Benjamín Kemp MD 12/6/2021 1401 Pathology       Findings: See printed and scanned procedure note    Complications: None    Dr. Sudeep Lewis MD  12/6/2021  2:27 PM

## 2021-12-06 NOTE — DISCHARGE INSTRUCTIONS
Upper GI Endoscopy: What to Expect at [address] [place] [address]  After you have an endoscopy, you will stay at the hospital or clinic for 1 to 2 hours. This will allow the medicine to wear off. You will be able to go home after your doctor or nurse checks to make sure you are not having any problems. You may have to stay overnight if you had treatment during the test. You may have a sore throat for a day or two after the test.  This care sheet gives you a general idea about what to expect after the test.  How can you care for yourself at home? Activity  · Rest as much as you need to after you go home. · You should be able to go back to your usual activities the day after the test.  Diet  · Follow your doctor's directions for eating after the test.  · Drink plenty of fluids (unless your doctor has told you not to). Medications  · If you have a sore throat the day after the test, use an over-the-counter spray to numb your throat. Follow-up care is a key part of your treatment and safety. Be sure to make and go to all appointments, and call your doctor if you are having problems. It's also a good idea to know your test results and keep a list of the medicines you take. When should you call for help? Call 911 anytime you think you may need emergency care. For example, call if:  · You passed out (lost consciousness). · You cough up blood. · You vomit blood or what looks like coffee grounds. · You pass maroon or very bloody stools. Call your doctor now or seek immediate medical care if:  · You have trouble swallowing. · You have belly pain. · Your stools are black and tarlike or have streaks of blood. · You are sick to your stomach or cannot keep fluids down. Watch closely for changes in your health, and be sure to contact your doctor if:  · Your throat still hurts after a day or two. · You do not get better as expected. Where can you learn more?    Go to DealExplorer.be  Enter J454 in the search box to learn more about \"Upper GI Endoscopy: What to Expect at Home. \"   © 7253-1557 Healthwise, Incorporated. Care instructions adapted under license by [employer] (which disclaims liability or warranty for this information). This care instruction is for use with your licensed healthcare professional. If you have questions about a medical condition or this instruction, always ask your healthcare professional. [address]  any warranty or liability for your use of this information. Content Version: 63.6.828664; Current as of: November 14, 2014    DISCHARGE SUMMARY from Nurse     POST-PROCEDURE INSTRUCTIONS:    Call your Physician if you:  ? Observe any excess bleeding. ? Develop a temperature over 100.5o F.  ? Experience abdominal, shoulder or chest pain. ? Notice any signs of decreased circulation or nerve impairment to an extremity such as a change in color, persistent numbness, tingling, coldness or increase in pain. ? Vomit blood or you have nausea and vomiting lasting longer than 4 hours. ? Are unable to take medications. ? Are unable to urinate within 8 hours after discharge following general anesthesia or intravenous sedation. For the next 24 hours after receiving general anesthesia or intravenous sedation, or while taking prescription Narcotics, limit your activities:  ? Do NOT drive a motor vehicle, operate hazard machinery or power tools, or perform tasks that require coordination. The medication you received during your procedure may have some effect on your mental awareness. ? Do NOT make important personal or business decisions. The medication you received during your procedure may have some effect on your mental awareness. ? Do NOT drink alcoholic beverages. These drinks do not mix well with the medications that have been given to you. ? Upon discharge from the hospital, you must be accompanied by a responsible adult. ?  Resume your diet as directed by your physician. ? Resume medications as your physician has prescribed. ? Please give a list of your current medications to your Primary Care Provider. ? Please update this list whenever your medications are discontinued, doses are changed, or new medications (including over-the-counter products) are added. ? Please carry medication information at all times in case of emergency situations. These are general instructions for a healthy lifestyle:    No smoking/ No tobacco products/ Avoid exposure to second hand smoke.  Surgeon General's Warning:  Quitting smoking now greatly reduces serious risk to your health. Obesity, smoking, and a sedentary lifestyle greatly increase your risk for illness.  A healthy diet, regular physical exercise & weight monitoring are important for maintaining a healthy lifestyle   You may be retaining fluid if you have a history of heart failure or if you experience any of the following symptoms:  Weight gain of 3 pounds or more overnight or 5 pounds in a week, increased swelling in our hands or feet or shortness of breath while lying flat in bed. Please call your doctor as soon as you notice any of these symptoms; do not wait until your next office visit. Colorectal Screening   Colorectal cancer almost always develops from precancerous polyps (abnormal growths) in the colon or rectum. Screening tests can find precancerous polyps, so that they can be removed before they turn into cancer. Screening tests can also find colorectal cancer early, when treatment works best.  [place] Speak with your physician about when you should begin screening and how often you should be tested  [place]   Additional Information    Educational references and/or instructions provided during this visit included:    See attached. APPOINTMENTS:    Per MD Instruction. Discharge information has been reviewed with the patient. The patient verbalized understanding.     Colonoscopy: What to Expect at Home  Your Recovery  After you have a colonoscopy, you will stay at the clinic for 1 to 2 hours until the medicines wear off. Then you can go home. But you will need to arrange for a ride. Your doctor will tell you when you can eat and do your other usual activities. Your doctor will talk to you about when you will need your next colonoscopy. Your doctor can help you decide how often you need to be checked. This will depend on the results of your test and your risk for colorectal cancer. After the test, you may be bloated or have gas pains. You may need to pass gas. If a biopsy was done or a polyp was removed, you may have streaks of blood in your stool (feces) for a few days. This care sheet gives you a general idea about how long it will take for you to recover. But each person recovers at a different pace. Follow the steps below to get better as quickly as possible. How can you care for yourself at home? Activity  · Rest when you feel tired. · You can do your normal activities when it feels okay to do so. Diet  · Follow your doctor's directions for eating. · Unless your doctor has told you not to, drink plenty of fluids. This helps to replace the fluids that were lost during the colon prep. · Do not drink alcohol. Medicines  · If polyps were removed or a biopsy was done during the test, your doctor may tell you not to take aspirin or other anti-inflammatory medicines for a few days. These include ibuprofen (Advil, Motrin) and naproxen (Aleve). Other instructions  · For your safety, do not drive or operate machinery until the medicine wears off and you can think clearly. Your doctor may tell you not to drive or operate machinery until the day after your test.  · Do not sign legal documents or make major decisions until the medicine wears off and you can think clearly. The anesthesia can make it hard for you to fully understand what you are agreeing to.   Follow-up care is a key part of your treatment and safety. Be sure to make and go to all appointments, and call your doctor if you are having problems. It's also a good idea to know your test results and keep a list of the medicines you take. When should you call for help? Call 911 anytime you think you may need emergency care. For example, call if:  · You passed out (lost consciousness). · You pass maroon or bloody stools. · You have severe belly pain. Call your doctor now or seek immediate medical care if:  · Your stools are black and tarlike. · Your stools have streaks of blood, but you did not have a biopsy or any polyps removed. · You have belly pain, or your belly is swollen and firm. · You vomit. · You have a fever. · You are very dizzy. Watch closely for changes in your health, and be sure to contact your doctor if you have any problems. Where can you learn more? Go to [employer]ug[employer].be  Enter E264 in the search box to learn more about \"Colonoscopy: What to Expect at Home. \"   © 8838-8776 Healthwise, Incorporated. Care instructions adapted under license by [facility] (which disclaims liability or warranty for this information). This care instruction is for use with your licensed healthcare professional. If you have questions about a medical condition or this instruction, always ask your healthcare professional. [address] any warranty or liability for your use of this information. Content Version: 53.7.864561; Current as of: November 14, 2014      DISCHARGE SUMMARY from Nurse     POST-PROCEDURE INSTRUCTIONS:    Call your Physician if you:  ? Observe any excess bleeding. ? Develop a temperature over 100.5o F.  ? Experience abdominal, shoulder or chest pain. ? Notice any signs of decreased circulation or nerve impairment to an extremity such as a change in color, persistent numbness, tingling, coldness or increase in pain. ?  Vomit blood or you have nausea and vomiting lasting longer than 4 hours.  ? Are unable to take medications. ? Are unable to urinate within 8 hours after discharge following general anesthesia or intravenous sedation. For the next 24 hours after receiving general anesthesia or intravenous sedation, or while taking prescription Narcotics, limit your activities:  ? Do NOT drive a motor vehicle, operate hazard machinery or power tools, or perform tasks that require coordination. The medication you received during your procedure may have some effect on your mental awareness. ? Do NOT make important personal or business decisions. The medication you received during your procedure may have some effect on your mental awareness. ? Do NOT drink alcoholic beverages. These drinks do not mix well with the medications that have been given to you. ? Upon discharge from the hospital, you must be accompanied by a responsible adult. ? Resume your diet as directed by your physician. ? Resume medications as your physician has prescribed. ? Please give a list of your current medications to your Primary Care Provider. ? Please update this list whenever your medications are discontinued, doses are changed, or new medications (including over-the-counter products) are added. ? Please carry medication information at all times in case of emergency situations. These are general instructions for a healthy lifestyle:    No smoking/ No tobacco products/ Avoid exposure to second hand smoke.  Surgeon General's Warning:  Quitting smoking now greatly reduces serious risk to your health. Obesity, smoking, and a sedentary lifestyle greatly increase your risk for illness.    A healthy diet, regular physical exercise & weight monitoring are important for maintaining a healthy lifestyle   You may be retaining fluid if you have a history of heart failure or if you experience any of the following symptoms:  Weight gain of 3 pounds or more overnight or 5 pounds in a week, increased swelling in our hands or feet or shortness of breath while lying flat in bed. Please call your doctor as soon as you notice any of these symptoms; do not wait until your next office visit. Colorectal Screening   Colorectal cancer almost always develops from precancerous polyps (abnormal growths) in the colon or rectum. Screening tests can find precancerous polyps, so that they can be removed before they turn into cancer. Screening tests can also find colorectal cancer early, when treatment works best.  [place] Speak with your physician about when you should begin screening and how often you should be tested. Additional Information    Educational references and/or instructions provided during this visit included:    See attached. APPOINTMENTS:    Per MD Instruction. Discharge information has been reviewed with the patient. The patient verbalized understanding. Patient Education        Duodenitis: Care Instructions  Your Care Instructions     The duodenum (say \"doo-AW-duh-num\") is the first part of the small intestine. It connects to the stomach. It's about 10 inches long and curved, almost forming a [place]. Duodenitis (say \"doo-aw-duh-[place]-tus\") may feel like a sore and upset stomach. It happens when something irritates the lining of the duodenum. Many things can cause it. These include an infection such as the flu or something you ate or drank. Certain medicines or having a sore (ulcer) on the lining of the duodenum also can cause it. Your belly may bloat and ache. You may belch, vomit, and feel sick to your stomach. You should be able to relieve the problem by taking medicine. And it may help to change your diet. If the problem lasts, your doctor may prescribe different medicine. Follow-up care is a key part of your treatment and safety. Be sure to make and go to all appointments, and call your doctor if you are having problems.  It's also a good idea to know your test results and keep a list of the medicines you take. How can you care for yourself at home? · If your doctor prescribed antibiotics, take them as directed. Do not stop taking them just because you feel better. You need to take the full course of antibiotics. · Be safe with medicines. If your doctor prescribed medicine to decrease stomach acid, take it as directed. Call your doctor if you think you are having a problem with your medicine. · Do not take any other medicine, including over-the-counter pain relievers, without talking to your doctor first.  · If your doctor recommends over-the-counter medicine to reduce stomach acid, such as Pepcid AC (famotidine), Prilosec (omeprazole), or Tagamet HB (cimetidine), follow the directions on the label. · To prevent dehydration, drink plenty of fluids. Choose water and other clear liquids until you feel better. If you have kidney, heart, or liver disease and have to limit fluids, talk with your doctor before you increase the amount of fluids you drink. · Limit how much alcohol you drink. · Avoid coffee, tea, cola drinks, chocolate, and other foods with caffeine. They increase stomach acid. When should you call for help? Call 911 anytime you think you may need emergency care. For example, call if:    · Your stools are maroon or very bloody.     · You vomit blood or what looks like coffee grounds. Call your doctor now or seek immediate medical care if:    · You start breathing fast and have not produced urine in the last 8 hours.     · You are sick to your stomach or cannot drink fluids. Watch closely for changes in your health, and be sure to contact your doctor if:    · You do not get better as expected. Where can you learn more? Go to http://www.gray.com/  Enter U273 in the search box to learn more about \"Duodenitis: Care Instructions. \"  Current as of: February 10, 2021               Content Version: 13.0  © 1836-3407 Healthwise, Incorporated.    Care instructions adapted under license by [employer] (which disclaims liability or warranty for this information). If you have questions about a medical condition or this instruction, always ask your healthcare professional. [address] any warranty or liability for your use of this information. Patient Education   Patient Education        Gastritis: Care Instructions  Your Care Instructions     Gastritis is a sore and upset stomach. It happens when something irritates the stomach lining. Many things can cause it. These include an infection such as the flu or something you ate or drank. Medicines or a sore on the lining of the stomach (ulcer) also can cause it. Your belly may bloat and ache. You may belch, vomit, and feel sick to your stomach. You should be able to relieve the problem by taking medicine. And it may help to change your diet. If gastritis lasts, your doctor may prescribe medicine. Follow-up care is a key part of your treatment and safety. Be sure to make and go to all appointments, and call your doctor if you are having problems. It's also a good idea to know your test results and keep a list of the medicines you take. How can you care for yourself at home? · If your doctor prescribed antibiotics, take them as directed. Do not stop taking them just because you feel better. You need to take the full course of antibiotics. · Be safe with medicines. If your doctor prescribed medicine to decrease stomach acid, take it as directed. Call your doctor if you think you are having a problem with your medicine. · Do not take any other medicine, including over-the-counter pain relievers, without talking to your doctor first.  · If your doctor recommends over-the-counter medicine to reduce stomach acid, such as Pepcid AC (famotidine), Prilosec (omeprazole), or Tagamet HB (cimetidine) follow the directions on the label. · Drink plenty of fluids to prevent dehydration.  Choose water and other clear liquids. If you have kidney, heart, or liver disease and have to limit fluids, talk with your doctor before you increase the amount of fluids you drink. · Limit how much alcohol you drink. · Limit or avoid caffeine, which is found in coffee, tea, cola drinks, and chocolate. When should you call for help? Call 911 anytime you think you may need emergency care. For example, call if:    · You vomit blood or what looks like coffee grounds.     · You pass maroon or very bloody stools. Call your doctor now or seek immediate medical care if:    · You start breathing fast and have not produced urine in the last 8 hours.     · You cannot keep fluids down. Watch closely for changes in your health, and be sure to contact your doctor if:    · You do not get better as expected. Where can you learn more? Go to http://www.alvarado.com/  Enter Z536 in the search box to learn more about \"Gastritis: Care Instructions. \"  Current as of: February 10, 2021               Content Version: 13.0  © 2006-2021 [employer]. Care instructions adapted under license by [employer] (which disclaims liability or warranty for this information). If you have questions about a medical condition or this instruction, always ask your healthcare professional. [address] any warranty or liability for your use of this information. Esophagitis: Care Instructions  Your Care Instructions     Esophagitis (say \"ih-sof-uh-JY-tus\") is irritation of the esophagus, the tube that carries food from your throat to your stomach. Acid reflux is the most common cause of this condition. When you have reflux, stomach acid and juices flow upward. This can cause pain or a burning feeling in your chest. You may have a sore throat. It may be hard to swallow. Other causes of this condition include some medicines and supplements. Allergies or an infection can also cause it.   Your doctor will ask about your symptoms and past health. He or she might do tests to find the cause of your symptoms. Treatment depends on what is causing the problem. Treatment might include changing your diet or taking medicine to relieve your symptoms. It might also include changing a medicine that is causing your symptoms. If you have reflux, medicine that reduces the stomach acid helps your body heal. It might take 1 to 3 weeks to heal.  Follow-up care is a key part of your treatment and safety. Be sure to make and go to all appointments, and call your doctor if you are having problems. It's also a good idea to know your test results and keep a list of the medicines you take. How can you care for yourself at home? · If you have acid reflux, your doctor may recommend that you:  ? Eat several small meals instead of two or three large meals. After you eat, wait 2 to 3 hours before you lie down. ? Avoid chocolate, mint, alcohol, and spicy foods. ? Don't smoke or use smokeless tobacco. Smoking can make this condition worse. If you need help quitting, talk to your doctor about stop-smoking programs and medicines. These can increase your chances of quitting for good. ? Raise the head of your bed 6 to 8 inches if you have symptoms at night. ? Lose weight if you are overweight. ? Take an over-the-counter antacid, such as Maalox, Mylanta, or Tums. Be careful when you take over-the-counter antacid medicines. Many of these medicines have aspirin in them. Read the label to make sure that you are not taking more than the recommended dose. Too much aspirin can be harmful. ? Take stronger acid reducers. Examples are famotidine (such as Pepcid) and omeprazole (such as Prilosec). · If your condition is caused by infection, allergy, or other problems, use the medicine or treatments that your doctor recommends. · Be safe with medicines. Take your medicines exactly as prescribed.  Call your doctor if you think you are having a problem with your medicine. When should you call for help? Call your doctor now or seek immediate medical care if:    · You have new or worse belly pain.     · You are vomiting. Watch closely for changes in your health, and be sure to contact your doctor if:    · You have new or worse symptoms of reflux.     · You have trouble or pain swallowing.     · You are losing weight.     · You do not get better as expected. Where can you learn more? Go to http://www.gray.com/  Enter N977 in the search box to learn more about \"Esophagitis: Care Instructions. \"  Current as of: February 10, 2021               Content Version: 13.0  © 9060-8777 [employer]. Care instructions adapted under license by [employer] (which disclaims liability or warranty for this information). If you have questions about a medical condition or this instruction, always ask your healthcare professional. [place] [address] any warranty or liability for your use of this information. Patient Education        Learning About Diverticulosis and Diverticulitis  What are diverticulosis and diverticulitis? In diverticulosis and diverticulitis, pouches called diverticula form in the wall of the large intestine, or colon. · In diverticulosis, the pouches do not cause any pain or other symptoms. · In diverticulitis, the pouches get inflamed or infected and cause symptoms. Doctors aren't sure what causes these pouches in the colon. But they think that a low-fiber diet may play a role. Without fiber to add bulk to the stool, the colon has to work harder than normal to push the stool forward. The pressure from this may cause pouches to form in weak spots along the colon. Some people with diverticulosis get diverticulitis. But experts don't know why this happens. What are the symptoms? · In diverticulosis, most people don't have symptoms. But pouches sometimes bleed.   · In diverticulitis, symptoms may last from a few hours to a week or more. They include:  ? Belly pain. This is usually in the lower left side. It is sometimes worse when you move. This is the most common symptom. ? Fever and chills. ? Bloating and gas. ? Diarrhea or constipation. ? Nausea and sometimes vomiting.  ? Not feeling like eating. How can you prevent diverticulitis? You may be able to lower your chance of getting diverticulitis. You can do this by taking steps to prevent constipation. · Eat fruits, vegetables, beans, and whole grains every day. These foods are high in fiber. · Drink plenty of fluids. If you have kidney, heart, or liver disease and have to limit fluids, talk with your doctor before you increase the amount of fluids you drink. · Get at least 30 minutes of exercise on most days of the week. Walking is a good choice. You also may want to do other activities, such as running, swimming, cycling, or playing tennis or team sports. · Take a fiber supplement, such as Citrucel or Metamucil, every day if needed. Read and follow all instructions on the label. · Schedule time each day for a bowel movement. Having a daily routine may help. Take your time and do not strain when having a bowel movement. Some people avoid nuts, seeds, berries, and popcorn. They believe that these foods might get trapped in the diverticula and cause pain. But there is no proof that these foods cause diverticulitis or make it worse. How are these problems treated? · The best way to treat diverticulosis is to avoid constipation. · Treatment for diverticulitis includes antibiotics. It often includes a change in your diet. You may need only liquids at first. Your doctor may suggest pain medicines for pain or belly cramps. In some cases, surgery may be needed. Follow-up care is a key part of your treatment and safety. Be sure to make and go to all appointments, and call your doctor if you are having problems.  It's also a good idea to know your test results and keep a list of the medicines you take. Where can you learn more? Go to http://www.gray.com/  Enter J431 in the search box to learn more about \"Learning About Diverticulosis and Diverticulitis. \"  Current as of: February 10, 2021               Content Version: 13.0  © 6254-4339 [employer]. Care instructions adapted under license by [employer] (which disclaims liability or warranty for this information). If you have questions about a medical condition or this instruction, always ask your healthcare professional. [place] [address] any warranty or liability for your use of this information. Patient Education     DISCHARGE SUMMARY from Nurse    PATIENT INSTRUCTIONS:    After general anesthesia or intravenous sedation, for 24 hours or while taking prescription Narcotics:  · Limit your activities  · Do not drive and operate hazardous machinery  · Do not make important personal or business decisions  · Do  not drink alcoholic beverages  · If you have not urinated within 8 hours after discharge, please contact your surgeon on call. Report the following to your surgeon:  · Excessive pain, swelling, redness or odor of or around the surgical area  · Temperature over 100.5  · Nausea and vomiting lasting longer than 4 hours or if unable to take medications  · Any signs of decreased circulation or nerve impairment to extremity: change in color, persistent  numbness, tingling, coldness or increase pain  · Any questions    What to do at Home:  Recommended activity: Activity as tolerated. *  Please give a list of your current medications to your Primary Care Provider. *  Please update this list whenever your medications are discontinued, doses are      changed, or new medications (including over-the-counter products) are added.     *  Please carry medication information at all times in case of emergency situations. These are general instructions for a healthy lifestyle:    No smoking/ No tobacco products/ Avoid exposure to second hand smoke  Surgeon General's Warning:  Quitting smoking now greatly reduces serious risk to your health. Obesity, smoking, and sedentary lifestyle greatly increases your risk for illness    A healthy diet, regular physical exercise & weight monitoring are important for maintaining a healthy lifestyle    You may be retaining fluid if you have a history of heart failure or if you experience any of the following symptoms:  Weight gain of 3 pounds or more overnight or 5 pounds in a week, increased swelling in our hands or feet or shortness of breath while lying flat in bed. Please call your doctor as soon as you notice any of these symptoms; do not wait until your next office visit. The discharge information has been reviewed with the {PATIENT PARENT GUARDIAN:62998}. The {PATIENT PARENT GUARDIAN:89147} verbalized understanding. Discharge medications reviewed with the {Dishcarge meds reviewed NZJY:03176} and appropriate educational materials and side effects teaching were provided. ___________________________________________________________________________________________________________________________________     Hemorrhoids: Care Instructions  Overview     Hemorrhoids are swollen veins that develop in the anal canal. Bleeding during bowel movements, itching, and rectal pain are the most common symptoms. Hemorrhoids can be uncomfortable at times, but rarely are they a serious problem. Most of the time, you can treat them with simple changes to your diet and bowel habits. These changes include eating more fiber and not straining to pass stools. Most hemorrhoids don't need surgery or other treatment unless they are very large and painful or bleed a lot. Follow-up care is a key part of your treatment and safety.  Be sure to make and go to all appointments, and call your doctor if you are having problems. It's also a good idea to know your test results and keep a list of the medicines you take. How can you care for yourself at home? · Sit in a few inches of warm water (sitz bath) 3 times a day and after bowel movements. The warm water helps with pain and itching. · Put ice on your anal area several times a day for 10 minutes at a time. Put a thin cloth between the ice and your skin. Follow this by placing a warm, wet towel on the area for another 10 to 20 minutes. · Take pain medicines exactly as directed. ? If the doctor gave you a prescription medicine for pain, take it as prescribed. ? If you are not taking a prescription pain medicine, ask your doctor if you can take an over-the-counter medicine. · Keep the anal area clean, but be gentle. Use water and a fragrance-free soap, or use baby wipes or medicated pads such as Tucks. · Wear cotton underwear and loose clothing to decrease moisture in the anal area. · Eat more fiber. Include foods such as whole-grain breads and cereals, raw vegetables, raw and dried fruits, and beans. · Drink plenty of fluids. If you have kidney, heart, or liver disease and have to limit fluids, talk with your doctor before you increase the amount of fluids you drink. · Use a stool softener that contains bran or psyllium. You can save money by buying bran or psyllium (available in bulk at most health food stores) and sprinkling it on foods or stirring it into fruit juice. Or you can use a product such as Metamucil or Hydrocil. · Practice healthy bowel habits. ? Go to the bathroom as soon as you have the urge. ? Avoid straining to pass stools. Relax and give yourself time to let things happen naturally. ? Do not hold your breath while passing stools. ? Do not read while sitting on the toilet. Get off the toilet as soon as you have finished. · Take your medicines exactly as prescribed.  Call your doctor if you think you are having a problem with your medicine. When should you call for help? Call 911 anytime you think you may need emergency care. For example, call if:    · You pass maroon or very bloody stools. Call your doctor now or seek immediate medical care if:    · You have increased pain.     · You have increased bleeding. Watch closely for changes in your health, and be sure to contact your doctor if:    · Your symptoms have not improved after 3 or 4 days. Where can you learn more? Go to http://www.alvarado.com/  Enter F228 in the search box to learn more about \"Hemorrhoids: Care Instructions. \"  Current as of: February 10, 2021               Content Version: 13.0  © 5070-1584 [employer], [employer]. Care instructions adapted under license by [employer] (which disclaims liability or warranty for this information). If you have questions about a medical condition or this instruction, always ask your healthcare professional. [address] any warranty or liability for your use of this information.

## 2021-12-06 NOTE — H&P
[facility] [facility]  [phone]    GASTROENTEROLOGY Pre-Procedure H and P      Impression/Plan:   1. This patient is consented for an EGD and colonoscopy for Unexplained weight loss, colon cancer screening      Chief Complaint: Unexplained weight loss, colon cancer screening  HPI:  Kirti Olivera is a 64 y.o. female who is being is having an EGD and colonoscopy for unexplained weight loss, colon cancer screening    PMH:   Past Medical History:   Diagnosis Date    Arthritis     Knees and left shoulder    [facility]     Cancer ([facility] 1978    squamous cell CA skin    Chronic kidney disease     Stage 3a chronic kidney disease    Chronic obstructive pulmonary disease (HCC)     High cholesterol     Hypertension     Lung cancer ([facility] 4/9/2014    Malignant neoplasm of bladder ([facility] 01/23/2017    High grade TCC, s/p TURBT small +Mitomycin-C instillation -- Dr. Niko Grijalva Mass of bladder     Pulmonary nodule 1/4/14    Radiation     Recurrent UTI     Thyroid disease     Hypothyroidism       PSH:   Past Surgical History:   Procedure Laterality Date    HX APPENDECTOMY      HX COLONOSCOPY  2002    HX CYST REMOVAL Left     Breast    HX GYN      hysterectomy    HX HYSTERECTOMY  1994    HX OTHER SURGICAL      skin cancer removed.      HX OTHER SURGICAL      biopsy on thyroid     HX TUBAL LIGATION      HX UROLOGICAL  07/03/2017    Cysto, TURBT small, Bladder mapping biopsies with fulguration -- Dr. William Kang at [address] UNLISTED      Radiosurgery       Social HX:   Social History     Socioeconomic History    Marital status:      Spouse name: Not on file    Number of children: Not on file    Years of education: Not on file    Highest education level: Not on file   Occupational History    Not on file   Tobacco Use    Smoking status: Former Smoker     Packs/day: 0.50     Years: 40.00     Pack years: 20.00     Quit date: 2/7/2007     Years since quittin.8    Smokeless tobacco: Never Used   Vaping Use    Vaping Use: Never used   Substance and Sexual Activity    Alcohol use: Yes     Alcohol/week: 5.0 - 6.0 standard drinks     Types: 1 - 2 Cans of beer, 4 Standard drinks or equivalent per week     Comment: occ    Drug use: Never    Sexual activity: Not Currently     Partners: Male   Other Topics Concern    Not on file   Social History Narrative    Not on file     Social Determinants of Health     Financial Resource Strain:     Difficulty of Paying Living Expenses: Not on file   Food Insecurity:     Worried About Running Out of Food in the Last Year: Not on file    Janet of Food in the Last Year: Not on file   Transportation Needs:     Lack of Transportation (Medical): Not on file    Lack of Transportation (Non-Medical):  Not on file   Physical Activity:     Days of Exercise per Week: Not on file    Minutes of Exercise per Session: Not on file   Stress:     Feeling of Stress : Not on file   Social Connections:     Frequency of Communication with Friends and Family: Not on file    Frequency of Social Gatherings with Friends and Family: Not on file    Attends [employer] Services: Not on file    Active Member of [address] [place] [address] [employer] or Organizations: Not on file    Attends Club or Organization Meetings: Not on file    Marital Status: Not on file   Intimate Partner Violence:     Fear of Current or Ex-Partner: Not on file    Emotionally Abused: Not on file    Physically Abused: Not on file    Sexually Abused: Not on file   Housing Stability:     Unable to Pay for Housing in the Last Year: Not on file    Number of [place] in the Last Year: Not on file    Unstable Housing in the Last Year: Not on file       FHX:   Family History   Problem Relation Age of Onset    Hypertension Mother     Heart Attack Sister        Allergy:   Allergies   Allergen Reactions    Augmentin [Amoxicillin-Pot Clavulanate] Itching       Home Medications: Medications Prior to Admission   Medication Sig    potassium chloride (KAON 10%) 20 mEq/15 mL solution 20 meq daily/15 ml    meloxicam (MOBIC) 15 mg tablet 15 mg.    atorvastatin (LIPITOR) 40 mg tablet TK 1 T PO QHS    PROAIR HFA 90 mcg/actuation inhaler INHALE 2 PUFFS PO Q 4 H    mometasone-formoterol (DULERA) 100-5 mcg/actuation HFA inhaler Take 1 Puff by inhalation daily.  CALCIUM CARBONATE (CALCIUM 600 PO) Take  by mouth.  cholecalciferol (VITAMIN D3) 1,000 unit tablet Take  by mouth daily.  ibandronate (BONIVA) 150 mg tablet Take 150 mg by mouth every thirty (30) days.  amLODIPine (NORVASC) 5 mg tablet Take 5 mg by mouth daily.  azithromycin (ZITHROMAX) 250 mg tablet  (Patient not taking: Reported on 11/29/2021)    chlorhexidine (PERIDEX) 0.12 % solution RINSE AND EXPECTORATE 1/2 OUNCE QID AFTER MEALS AND THE FINAL RINSE BEING AT BEDTIME (Patient not taking: Reported on 11/29/2021)    diclofenac (VOLTAREN) 1 % gel Apply  to affected area four (4) times daily. (Patient not taking: Reported on 11/29/2021)       Review of Systems:     Constitutional: No fevers, chills, weight loss, fatigue. Skin: No rashes, pruritis, jaundice, ulcerations, erythema. HENT: No headaches, nosebleeds, sinus pressure, rhinorrhea, sore throat. Eyes: No visual changes, blurred vision, eye pain, photophobia, jaundice. Cardiovascular: No chest pain, heart palpitations. Respiratory: No cough, SOB, wheezing, chest discomfort, orthopnea. Gastrointestinal: Neg unless noted otherwise in H&P   Genitourinary: No dysuria, bleeding, discharge, pyuria. Musculoskeletal: No weakness, arthralgias, wasting. Endo: No sweats. Heme: No bruising, easy bleeding. Allergies: As noted. Neurological: Cranial nerves intact. Alert and oriented. Gait not assessed. Psychiatric:  No anxiety, depression, hallucinations.           Visit Vitals  /79 (BP 1 Location: Right upper arm, BP Patient Position: At rest) Pulse (!) 110   Temp 98.6 °F (37 °C)   Resp 16   Ht 5' 1\" (1.549 m)   Wt 50 kg (110 lb 3.2 oz)   SpO2 97%   BMI 20.82 kg/m²       Physical Assessment:     constitutional: appearance: well developed, well nourished, normal habitus, no deformities, in no acute distress. skin: inspection: no rashes, ulcers, icterus or other lesions; no clubbing or telangiectasias. palpation: no induration or subcutaneos nodules. eyes: inspection: normal conjunctivae and lids; no jaundice pupils: normal  ENMT: mouth: normal oral mucosa,lips and gums; good dentition. oropharynx: normal tongue, hard and soft palate; posterior pharynx without erithema, exudate or lesions. neck: thyroid: normal size, consistency and position; no masses or tenderness. respiratory: effort: normal chest excursion; no intercostal retraction or accessory muscle use. cardiovascular: abdominal aorta: normal size and position; no bruits. palpation: PMI of normal size and position; normal rhythm; no thrill or murmurs. abdominal: abdomen: normal consistency; no tenderness or masses. hernias: no hernias appreciated. liver: normal size and consistency. spleen: not palpable. rectal: hemoccult/guaiac: not performed. musculoskeletal: digits and nails: no clubbing, cyanosis, petechiae or other inflammatory conditions. gait: normal gait and station head and neck: normal range of motion; no pain, crepitation or contracture. spine/ribs/pelvis: normal range of motion; no pain, deformity or contracture. neurologic: cranial nerves: II-XII normal.   psychiatric: judgement/insight: within normal limits. memory: within normal limits for recent and remote events. mood and affect: no evidence of depression, anxiety or agitation. orientation: oriented to time, space and person. Basic Metabolic Profile   No results for input(s): NA, K, CL, CO2, BUN, GLU, CA, MG, PHOS in the last 72 hours.     No lab exists for component: CREAT      CBC w/Diff    No results for input(s): WBC, RBC, HGB, HCT, MCV, MCH, MCHC, RDW, PLT, HGBEXT, HCTEXT, PLTEXT in the last 72 hours. No lab exists for component: MPV No results for input(s): GRANS, LYMPH, EOS, PRO, MYELO, METAS, BLAST in the last 72 hours. No lab exists for component: MONO, BASO     Hepatic Function   No results for input(s): ALB, TP, TBILI, AP, AML, LPSE in the last 72 hours. No lab exists for component: DBILI, GPT, SGOT     Coags   No results for input(s): PTP, INR, APTT, INREXT in the last 72 hours. Kourtney Bee MD  [facility] [address] 1949, [address] [place] [address]  Cell: [phone]  Direct pager: [phone]  [email or web address]  [email or web address]

## 2022-01-26 ENCOUNTER — HOSPITAL ENCOUNTER (OUTPATIENT)
Dept: CT IMAGING | Age: 77
Discharge: HOME | End: 2022-01-26
Attending: INTERNAL MEDICINE
Payer: MEDICARE

## 2022-01-26 DIAGNOSIS — C34.31 PRIMARY MALIGNANT NEOPLASM OF BRONCHUS OF RIGHT LOWER LOBE (HCC): ICD-10-CM

## 2022-01-26 LAB — CREAT UR-MCNC: 1.1 MG/DL (ref 0.6–1.3)

## 2022-01-26 PROCEDURE — 82565 ASSAY OF CREATININE: CPT

## 2022-01-26 PROCEDURE — 74011000636 HC RX REV CODE- 636: Performed by: INTERNAL MEDICINE

## 2022-01-26 PROCEDURE — 71260 CT THORAX DX C+: CPT

## 2022-01-26 RX ADMIN — IOPAMIDOL 75 ML: 612 INJECTION, SOLUTION INTRAVENOUS at 18:26

## 2022-02-03 ENCOUNTER — HOSPITAL ENCOUNTER (OUTPATIENT)
Dept: CT IMAGING | Age: 77
Discharge: HOME | End: 2022-02-03
Attending: UROLOGY
Payer: MEDICAID

## 2022-02-03 DIAGNOSIS — C67.9 MALIGNANT NEOPLASM OF URINARY BLADDER, UNSPECIFIED SITE (HCC): ICD-10-CM

## 2022-02-03 PROCEDURE — 82565 ASSAY OF CREATININE: CPT

## 2022-02-03 PROCEDURE — 74011000636 HC RX REV CODE- 636: Performed by: UROLOGY

## 2022-02-03 PROCEDURE — 74178 CT ABD&PLV WO CNTR FLWD CNTR: CPT

## 2022-02-03 RX ADMIN — IOPAMIDOL 96 ML: 755 INJECTION, SOLUTION INTRAVENOUS at 16:39

## 2022-02-04 LAB — CREAT UR-MCNC: 1.1 MG/DL (ref 0.6–1.3)

## 2022-03-25 PROBLEM — N18.30 STAGE 3 CHRONIC KIDNEY DISEASE (HCC): Status: ACTIVE | Noted: 2020-09-21

## 2022-03-30 ENCOUNTER — OFFICE VISIT (OUTPATIENT)
Dept: PULMONOLOGY | Age: 77
End: 2022-03-30
Payer: MEDICARE

## 2022-03-30 VITALS
DIASTOLIC BLOOD PRESSURE: 84 MMHG | TEMPERATURE: 97.9 F | OXYGEN SATURATION: 95 % | HEART RATE: 118 BPM | HEIGHT: 61 IN | WEIGHT: 109 LBS | RESPIRATION RATE: 16 BRPM | BODY MASS INDEX: 20.58 KG/M2 | SYSTOLIC BLOOD PRESSURE: 146 MMHG

## 2022-03-30 DIAGNOSIS — R06.09 DYSPNEA ON EXERTION: ICD-10-CM

## 2022-03-30 DIAGNOSIS — Z87.891 HISTORY OF TOBACCO ABUSE: ICD-10-CM

## 2022-03-30 DIAGNOSIS — J43.2 CENTRILOBULAR EMPHYSEMA (HCC): Primary | ICD-10-CM

## 2022-03-30 DIAGNOSIS — Z85.118 HISTORY OF LUNG CANCER: ICD-10-CM

## 2022-03-30 PROCEDURE — G8427 DOCREV CUR MEDS BY ELIG CLIN: HCPCS | Performed by: INTERNAL MEDICINE

## 2022-03-30 PROCEDURE — G8753 SYS BP > OR = 140: HCPCS | Performed by: INTERNAL MEDICINE

## 2022-03-30 PROCEDURE — 1090F PRES/ABSN URINE INCON ASSESS: CPT | Performed by: INTERNAL MEDICINE

## 2022-03-30 PROCEDURE — G8754 DIAS BP LESS 90: HCPCS | Performed by: INTERNAL MEDICINE

## 2022-03-30 PROCEDURE — G8510 SCR DEP NEG, NO PLAN REQD: HCPCS | Performed by: INTERNAL MEDICINE

## 2022-03-30 PROCEDURE — 99204 OFFICE O/P NEW MOD 45 MIN: CPT | Performed by: INTERNAL MEDICINE

## 2022-03-30 PROCEDURE — 1101F PT FALLS ASSESS-DOCD LE1/YR: CPT | Performed by: INTERNAL MEDICINE

## 2022-03-30 PROCEDURE — G8536 NO DOC ELDER MAL SCRN: HCPCS | Performed by: INTERNAL MEDICINE

## 2022-03-30 PROCEDURE — G8420 CALC BMI NORM PARAMETERS: HCPCS | Performed by: INTERNAL MEDICINE

## 2022-03-30 RX ORDER — DOCUSATE SODIUM 100 MG/1
100 CAPSULE, LIQUID FILLED ORAL
COMMUNITY
End: 2022-09-19

## 2022-03-30 RX ORDER — UMECLIDINIUM BROMIDE AND VILANTEROL TRIFENATATE 62.5; 25 UG/1; UG/1
1 POWDER RESPIRATORY (INHALATION) DAILY
Qty: 2 EACH | Refills: 0 | Status: SHIPPED | COMMUNITY
Start: 2022-03-30 | End: 2022-04-13

## 2022-03-30 RX ORDER — PROCHLORPERAZINE MALEATE 10 MG
10 TABLET ORAL
COMMUNITY
End: 2022-06-15

## 2022-03-30 RX ORDER — ONDANSETRON HYDROCHLORIDE 8 MG/1
8 TABLET, FILM COATED ORAL
COMMUNITY
End: 2022-06-15

## 2022-03-30 RX ORDER — UMECLIDINIUM BROMIDE AND VILANTEROL TRIFENATATE 62.5; 25 UG/1; UG/1
1 POWDER RESPIRATORY (INHALATION) DAILY
Qty: 60 EACH | Refills: 5 | Status: SHIPPED | COMMUNITY
Start: 2022-03-30 | End: 2022-06-02 | Stop reason: SDUPTHER

## 2022-03-30 RX ORDER — DEXAMETHASONE 4 MG/1
4 TABLET ORAL EVERY 12 HOURS
COMMUNITY
End: 2022-06-15

## 2022-03-30 NOTE — PROGRESS NOTES
[facility]                                                       Pulmonary, Critical Care, and Sleep Medicine      Pulmonary Office Initial Consultation    Name: Niko Gold     : 1945     Date: 3/30/2022        Subjective:   Patient has been referred for evaluation of: SOB  Patient is a 64 y.o. female with PMHx of Adenocarcinoma of the Lung, COPD, Urethelial cancer of the bladder, HLD, HTN, Osteoporosis, hypothyroidism, esophageal dysmotility and CKD. Of note, has a h/o poorly differentiated RLL Adenocarcinoma () - s/p RT ( - 2014) and repeat RT (16-16); deemed not surgical candidate d/t FEV1 - rpt CT chest (2022) w/ stable post-treatment changes in the RLL. T2N0 Urothelial cancer - Chemo (Cisplatine/gemcitabine). Oncologist - Dr. Gurwinder Andres    Today in clinic, she states that she is here to establish care. She states that she has been having some shortness of breath. Dyspnea is worse with going up the stairs, getting in and out of the shower, and walking. She is not not able to walk from her bedroom to her living room without dyspnea. Symptoms are typically relieved by rest for approximately 5 minutes. She has also noted some shortness of breath while undergoing chemotherapy. Symptoms associated with an intermittent cough which is nonproductive and wheezing. She denies chest pain/pressure or chest tightness, fever or chills. She does admit to weight loss since starting chemotherapy; unknown amount of weight loss. Currently on:   -Albuterol rescue inhaler - using 2 puff every 4 hours  -Breo-ellipta -just received; have not started using it yet. Tobacco/Substance/Vape: former smoker; quit 20 yrs ago; 1/2 ppd x43 years. No vape, hookah, or MJ.   Occupation: retired; homemaker  Travel: none  Pets: none  TB exposure/testing: no known exposure  VTE/DVT history: none   service: none  Hobbies: crosswSxbbm puzzles  Other exposures: no other exposures  Autoimmune disease: none in self or family  COVID-19 vaccination: s/p Moderna x3 doses  Marital status:     Of note, patient is here with her daughter who supplements HPI. Past Medical History:   Diagnosis Date    Arthritis     Knees and left shoulder    [facility]adder [facility] ([facility]     Cancer ([facility] 1978    squamous cell CA skin    Chronic kidney disease     Stage 3a chronic kidney disease    Chronic obstructive pulmonary disease (HCC)     High cholesterol     Hypertension     Lung cancer ([facility] 4/9/2014    Malignant neoplasm of bladder (HCC) 01/23/2017    High grade TCC, s/p TURBT small +Mitomycin-C instillation -- Dr. Mary Cannon Mass of bladder     Pulmonary nodule 1/4/14    Radiation     Recurrent UTI     Thyroid disease     Hypothyroidism       Past Surgical History:   Procedure Laterality Date    COLONOSCOPY N/A 12/6/2021    COLONOSCOPY performed by Jonathan Santana MD at [facility]    HX APPENDECTOMY      HX COLONOSCOPY  2002    HX CYST REMOVAL Left     Breast    HX GYN      hysterectomy    HX HYSTERECTOMY  1994    HX OTHER SURGICAL      skin cancer removed.      HX OTHER SURGICAL      biopsy on thyroid     HX TUBAL LIGATION      HX UROLOGICAL  07/03/2017    Cysto, TURBT small, Bladder mapping biopsies with fulguration -- Dr. Janelle Alex [address]  12/13/2021     CYSTOSCOPY , TRANSURETHRAL RESECTION BLADDER TUMOR ,BILATERAL RETROGRADE PYELOGRAM,BLADDER Dennis Shirlene; Dr. Vital Half      Radiosurgery       Social History     Socioeconomic History    Marital status:    Tobacco Use    Smoking status: Former Smoker     Packs/day: 0.50     Years: 40.00     Pack years: 20.00     Quit date: 2/7/2007     Years since quitting: 15.1    Smokeless tobacco: Never Used   Vaping Use    Vaping Use: Never used   Substance and Sexual Activity    Alcohol use: Yes     Alcohol/week: 5.0 - 6.0 standard drinks     Types: 1 - 2 Cans of beer, 4 Standard drinks or equivalent per week     Comment: occ    Drug use: Never    Sexual activity: Not Currently     Partners: Male       Family History   Problem Relation Age of Onset    Hypertension Mother     Heart Attack Sister        Allergies   Allergen Reactions    Augmentin [Amoxicillin-Pot Clavulanate] Itching       Current Outpatient Medications   Medication Sig Dispense Refill    dexAMETHasone (DECADRON) 4 mg tablet Take 4 mg by mouth every twelve (12) hours.  prochlorperazine (COMPAZINE) 10 mg tablet Take 10 mg by mouth every six (6) hours as needed for Nausea or Vomiting.  ondansetron hcl (Zofran) 8 mg tablet Take 8 mg by mouth every eight (8) hours as needed for Nausea or Vomiting.  docusate sodium (COLACE) 100 mg capsule Take 100 mg by mouth two (2) times daily as needed for Constipation.  omeprazole (PRILOSEC) 40 mg capsule       meloxicam (MOBIC) 15 mg tablet 15 mg.      atorvastatin (LIPITOR) 40 mg tablet TK 1 T PO QHS  5    PROAIR HFA 90 mcg/actuation inhaler INHALE 2 PUFFS PO Q 4 H  0    CALCIUM CARBONATE (CALCIUM 600 PO) Take  by mouth.  cholecalciferol (VITAMIN D3) 1,000 unit tablet Take  by mouth daily.  ibandronate (BONIVA) 150 mg tablet Take 150 mg by mouth every thirty (30) days.  amLODIPine (NORVASC) 5 mg tablet Take 5 mg by mouth daily.  famotidine (PEPCID) 20 mg tablet Take 20 mg by mouth daily. (Patient not taking: Reported on 3/30/2022)      potassium chloride (KAON 10%) 20 mEq/15 mL solution 20 meq daily/15 ml (Patient not taking: Reported on 3/30/2022)      mometasone-formoterol (DULERA) 100-5 mcg/actuation HFA inhaler Take 1 Puff by inhalation daily.  (Patient not taking: Reported on 3/30/2022)           Review of Systems:  HEENT: No epistaxis, no nasal drainage, no difficulty in swallowing, no redness in eyes  Respiratory: as above  Cardiovascular: no chest pain, no palpitations, no chronic leg edema, no syncope  Gastrointestinal: no abd pain, no vomiting, no diarrhea, no bleeding symptoms  Genitourinary: No urinary symptoms or hematuria  Integument/breast: No ulcers or rashes  Musculoskeletal: Neg  Neurological: No focal weakness, no seizures, no headaches  Behvioral/Psych: No anxiety, no depression  Constitutional: as above    Objective:     Visit Vitals  BP (!) 146/84 (BP 1 Location: Right arm, BP Patient Position: Sitting, BP Cuff Size: Adult)   Pulse (!) 131   Temp 97.9 °F (36.6 °C) (Temporal)   Resp 16   Ht 5' 1\" (1.549 m)   Wt 49.4 kg (109 lb)   SpO2 95%   BMI 20.60 kg/m²        Physical Exam:   General: comfortable, no acute distress  HEENT: pupils reactive, sclera anicteric, EOM intact, moist mucus membranes  Neck: No adenopathy or thyroid swelling, no lymphadenopathy or JVD, supple  CVS: S1S2 no murmurs. +Tachycardia. RS: Clear to auscultation bilaterally. No wheezes. No tachypnea or accessory muscle use. Abd: soft, non tender, no hepatosplenomegaly  Neuro: non focal, awake, alert  Extrm: no leg edema or cyanosis. +Digital clubbing. Skin: no rash    Data review:   Pertinent labs: CBC, BMP, LFT's  Serologies (ILD, JULIANA), IgE, allergy panel, alpha-1 antitrypsin, ACE level: Not available for review    PFT:  Date_____FEV1______FVC___FEV1/FVC___BDFEV1___BDFVC___pstBDratio  01/2014. ...0. 66(42%). ....1. 61(79%). .. ..41%. .....0.69(44%). ....1. 59(78%). .... 43%    6 min walk test: not available    Echocardiogram  Date: 3/3/22  CONCLUSIONS     * Left ventricular systolic function is hyperdynamic with an ejection fraction of 70 % by visual estimation.     * Left ventricular diastolic function: normal.     * Longitudinal strain of the left ventricle is  -10.7 %.      * Left ventricular chamber size is decreased.     * There is concentric left ventricular hypertrophy with a mildly thickened septal wall and mildly thickened posterior wall.     * Right ventricular systolic function is normal with TAPSE measuring 1.81 cm.     * Right ventricular chamber dimension is normal.     * No hemodynamically significant valvular disease. Comparison     * No prior study is available for comparison. Imaging:  I have personally reviewed the patients radiographs and have reviewed the reports:  XR Results (most recent):  Results from Abstract encounter on 21    XR CHEST PA LAT    [address] [address]    Imaging Result    Name:    Kofi English ([id number])    Sex: Female    :    1945    Ordering Provider: Roseann ALMAZAN Provider:    Diagnosis:    Malignant neoplasm of bladder, unspecified ([facility] [C67.9 (ICD-10-CM)]    None Specified    Procedures Performed:    CHEST AP AND LATERAL    [id number]    Exam Date/Time:    2021 11:24 AM      EXAM: CHEST RADIOGRAPHS    CLINICAL INDICATION/HISTORY: Provided with order -   C67.9: Malignant neoplasm of bladder, unspecified ([facility]    > Additional: Preop evaluation for lung biopsy. COMPARISON: None    TECHNIQUE: AP and lateral views of the chest  _______________    FINDINGS:    HEART AND MEDIASTINUM: No appreciable cardiomegaly. Remaining mediastinal contours within normal limits. Tortuous aorta. LUNGS AND PLEURAL SPACES: Hyperinflation with flattening of diaphragm compatible with emphysema. Right infrahilar mass. Atelectatic or fibrotic streaks right lung base. BONY THORAX AND SOFT TISSUES: Unremarkable.  _______________    IMPRESSION    Central right lower lobe pulmonary mass. Few atelectatic or fibrotic streaks right base. Emphysema.     Signed By: Florian Chao MD on 2021 4:25 PM    Dictated by: Evelyn Thompson on Wed Dec 1, 2021  4:24:09 PM EST    Signed By:Bita Crockett MD  2021  4:25 PM    [facility] [[id number]]    ~~This report was copied and pasted from the servicing EHR system. ~~      CT Results (most recent):  Results from Hospital Encounter encounter on 02/03/22    CT UROGRAM W WO CONT    Narrative  CT Abdomen And Pelvis Without And With Enhancement    INDICATION: Malignant neoplasm of urinary bladder, unspecified site. TECHNIQUE: Axial  helical scan through the abdomen and pelvis before and after  IV contrast were performed by dynamic enhanced scanning protocol. Delayed images  were obtained to the collecting system, ureters and bladder. MIP and 3-D  computer reformatted thin and thick section were then created on an independent  workstation in order to further evaluate collecting system and relationships  between collecting system, kidneys and ureters and to minimize the radiation  dose. All CT scans are performed using dose optimization techniques as  appropriate to the performed exam including the following: Automated exposure  control, adjustment of mA and/or kV according to patient size, and use of  iterative reconstructive technique. COMPARISON: Chest CT 1/26/2022, CT urogram 8/28/2020. FINDINGS:    URINARY TRACT:  Right kidney: No nephrolithiasis or hydronephrosis. A few small renal cysts are  stable. Small new hypoenhancing region of cortex of the lateral lower pole. No  filling defect along the intrarenal collecting system or right ureter. Left kidney: No nephrolithiasis or hydronephrosis. Renal cysts measure up to 5.5  cm. No enhancing mass. No filling defect along the intrarenal collecting system  or ureter. Bladder: Lobular bladder configuration. New site of localized wall thickening  along the left posterolateral mid bladder wall. No calculus a punctate site of  filling defect in the dependent aspect of the midline bladder could be traced  debris or artifact. ABDOMEN/PELVIS FINDINGS:  Emphysema. Refer to CT chest report for discussion of right perihilar masslike  consolidation. No new lung base findings. Mild hepatic steatosis. Gallbladder, CBD, pancreas and spleen are unremarkable. Mild stable adrenal gland thickening. No dilated bowel. Diverticulosis. No free fluid. Major vessels are patent. Atherosclerotic wall calcifications  along arteries. No lymphadenopathy in the abdomen or pelvis. No suspicious bone  lesions. Lumbar degenerative disc disease and facet hypertrophy with grade I  anterolisthesis at a few levels. Impression  1. New site of wall thickening at the posterolateral left mid bladder wall. Differential includes bladder neoplasm and focal cystitis. No evidence of  metastatic disease. 2. Renal cysts. 3. Small region of hypoenhancement in right kidney, likely a small infarct. 4. Mild hepatic steatosis. 5. Diverticulosis. 6. Stable lung base. Refer to CT chest report for discussion of right perihilar  abnormality. Patient Active Problem List   Diagnosis Code    Lung nodule R91.1    HTN (hypertension) I10    Abnormal EKG R94.31    Chronic airway obstruction, not elsewhere classified J44.9    Lung mass R91.8    Shortness of breath R06.02    Lung cancer (HCC) C34.90    Urothelial carcinoma (HCC) C68.9    Incomplete bladder emptying R33.9    Malignant neoplasm of urinary bladder (HCC) C67.9    Arthritis M19.90    Esophageal dysmotility K22.4    Hyperlipidemia E78.5    Hypothyroid E03.9    Osteoporosis M81.0    Malignant neoplasm of skin C44.90    Stage 3 chronic kidney disease (HCC) N18.30       IMPRESSION:   · COPD: Gold stage III; prior PFT w/ FEV1 0.69 L (44%) /FVC 1.59 L (78%). Symptoms not adequately controlled on DEBORA therapy. · Dyspnea on exertion: Likely multifactorial; suspect history of COPD with severe obstructive defect along with adenocarcinoma likely contributory. ? Chemotherapy-induced pulmonary fibrosis. SPO2 95% on RA today. Most recent lab work without anemia.   · H/o adenocarcinoma of the RLL (2014): s/p RT x2 and repeat imaging with stable posttreatment changes. · Urothelial/bladder carcinoma: Currently on chemotherapy (Cisplatine/gemcitabine). Following with oncologist - Dr. Sarah Navarro  · Sinus tachycardia: BP unremarkable. No chest pain/syncopal symptoms. Patient states she is tachycardic each time she is at the doctor's office and attributes it to anxiety. · H/o tobacco abuse: 21.5 pack year; quit 20 yrs ago  · Comorbid conditions: CKD, osteoporosis, hypothyroidism      RECOMMENDATIONS:   · Start on LABA/LAMA therapy with Anoro Ellipta 1 puff daily  · Discontinue Breo ellipta  · Continue albuterol rescue inhaler 1 to 2 puffs every 4-6 hours as needed for shortness of breath  · Obtain repeat complete pulmonary function testing and 6-minute walk  · Pulmonary rehab -we will determine eligibility pending above work-up  · Vaccination: Recommend all age-appropriate immunizations  · Continue follow-up with all of your other providers  · Follow up -2 months & prn.     Education:  Inhaler techniques, MDI/spacers, nebulizers use, goal setting, monitoring  Reducing anxiety, energy conservation tips, exercise, medications and Nutrition    Health maintenance screens deferred to Primary care provider.      Darren Calderon, DO  3/30/2022  Pulmonary, Critical Care Medicine  [facility]

## 2022-03-30 NOTE — LETTER
4/6/2022    Patient: Evy Quan   YOB: 1945   Date of Visit: 3/30/2022     Manpreet Cabrales MD  [address]  [address] [facility]  Via Fax: [phone]    Dear Manpreet Cabrales MD,      Thank you for referring Ms. Evy Quan to [address] [place] [address] for evaluation. My notes for this consultation are attached. If you have questions, please do not hesitate to call me. I look forward to following your patient along with you.       Sincerely,    Isaura Dasilva, DO

## 2022-03-30 NOTE — PROGRESS NOTES
Evy Quan presents today for No chief complaint on file. Is someone accompanying this pt? Daughter    Is the patient using any DME equipment during [address]? No    -DME Company N/A    Depression Screening:  No flowsheet data found. Learning Assessment:  Learning Assessment 1/7/2014   PRIMARY LEARNER Patient   BARRIERS PRIMARY LEARNER NONE   CO-LEARNER CAREGIVER Yes   CO-LEARNER NAME daughter   PRIMARY LANGUAGE ENGLISH   LEARNER PREFERENCE PRIMARY DEMONSTRATION     LISTENING       Abuse Screening:  No flowsheet data found. Fall Risk  Fall Risk Assessment, last 12 mths 7/3/2017   Able to walk? Yes   Fall in past 12 months? No         Coordination of Care:  1. Have you been to the ER, urgent care clinic since your last visit? Hospitalized since your last visit? No    2. Have you seen or consulted any other health care providers outside of the [address] [place] [address] since your last visit? Include any pap smears or colon screening.  No

## 2022-03-30 NOTE — PATIENT INSTRUCTIONS
What is the plan?  -Schedule and complete lung function test and walk test  -Start on Anoro inhaler as prescribed  -Plan for pulmonary rehabilitation after your surgery         Chronic Obstructive Pulmonary Disease (COPD): Care Instructions  Overview     Chronic obstructive pulmonary disease (COPD) is a lung disease that makes it hard to breathe. With COPD, the airways that lead to the lungs are narrowed, and the tiny air sacs in the lungs are damaged and lose their stretch. People with COPD have decreased airflow in and out of the lungs, which makes it hard to breathe. The airways also can get clogged with thick mucus. Cigarette smoking is a major cause of COPD. Although there is no cure for COPD, you can slow its progress. Following your treatment plan and taking care of yourself can help you feel better and live longer. Follow-up care is a key part of your treatment and safety. Be sure to make and go to all appointments, and call your doctor if you are having problems. It's also a good idea to know your test results and keep a list of the medicines you take. How can you care for yourself at home? Staying healthy    · Do not smoke. This is the most important step you can take to prevent more damage to your lungs. If you need help quitting, talk to your doctor about stop-smoking programs and medicines. These can increase your chances of quitting for good.     · Avoid colds and other infections. Get the pneumococcal and whooping cough (pertussis) vaccines. If you have had these vaccines before, ask your doctor if you need another dose. Get the flu vaccine every fall. If you must be around people with colds or the flu, wash your hands often.     · Avoid secondhand smoke and air pollution. Try to stay inside with your windows closed when air pollution is bad. Medicines and oxygen therapy    · Take your medicines exactly as prescribed. Call your doctor if you think you are having a problem with your medicine.  You may be taking medicines such as:  ? Bronchodilators. These help open your airways and make breathing easier. They are either short-acting (work for 4 to 9 hours) or long-acting (work for 12 to 24 hours). You inhale most bronchodilators, so they start to act quickly. Always carry your quick-relief inhaler with you in case you need it. ? Corticosteroids (prednisone, budesonide). These reduce airway inflammation. They come in inhaled or pill form.     · Ask your doctor or pharmacist if you are using each type of inhaler correctly. With correct use, the medicine is more likely to get to your lungs.     · See if a spacer is right for you. A spacer may also help you get more inhaled medicine to your lungs. If you use one, ask how to use it properly.     · Do not take any vitamins, over-the-counter medicine, or herbal products without talking to your doctor first.     · If your doctor prescribed antibiotics, take them as directed. Do not stop taking them just because you feel better. You need to take the full course of antibiotics.     · If you use oxygen therapy, use the flow rate your doctor has recommended. Don't change it without talking to your doctor first. Oxygen therapy boosts the amount of oxygen in your blood and helps you breathe easier. Activity    · Get regular exercise. Walking is an easy way to get exercise. Start out slowly, and walk a little more each day.     · Pay attention to your breathing. You are exercising too hard if you can't talk while you exercise.     · Take short rest breaks when doing household chores and other activities.     · Learn breathing methodssuch as breathing through pursed lipsto help you become less short of breath.     · If your doctor has not set you up with a pulmonary rehabilitation program, ask if rehab is right for you. Rehab includes exercise programs, education about your disease and how to manage it, help with diet and other changes, and emotional support.    Diet    · Eat regular, healthy meals.     · Use bronchodilators about 1 hour before you eat to make it easier to eat.     · Eat several smaller, frequent meals to prevent getting too full. A full stomach can push on the muscle that helps you breathe (your diaphragm) and make it harder to breathe.     · Drink beverages at the end of the meal.     · Avoid foods that are hard to chew.     · Eat foods that contain protein so you don't lose muscle mass.     · Talk with your doctor if you gain too much weight or if you lose weight without trying. Mental health    · Talk to your family, friends, or a therapist about your feelings. Some people feel frightened, angry, hopeless, helpless, and even guilty. Talking openly about feelings may help you cope. If these feelings last, talk to your doctor. When should you call for help? Call 911 anytime you think you may need emergency care. For example, call if:    · You have severe trouble breathing.     · You are having chest pain that is different or worse than usual.   Call your doctor now or seek immediate medical care if:    · You have new or worse trouble breathing.     · You cough up blood.     · You have a fever.     · You have feelings of anxiety or depression. Watch closely for changes in your health, and be sure to contact your doctor if:    · You cough more deeply or more often, especially if you notice more mucus or a change in the color of your mucus.     · You have new or worse swelling in your legs or belly.     · You are not getting better as expected. Where can you learn more? Go to http://www.gray.com/  Enter Z231 in the search box to learn more about \"Chronic Obstructive Pulmonary Disease (COPD): Care Instructions. \"  Current as of: July 6, 2021               Content Version: 13.2  © 6890-8332 [employer].    Care instructions adapted under license by [employer] (which disclaims liability or warranty for this information). If you have questions about a medical condition or this instruction, always ask your healthcare professional. [address] [address] any warranty or liability for your use of this information.

## 2022-05-28 ENCOUNTER — HOSPITAL ENCOUNTER (OUTPATIENT)
Dept: CT IMAGING | Age: 77
Discharge: HOME | End: 2022-05-28
Attending: UROLOGY
Payer: MEDICARE

## 2022-05-28 DIAGNOSIS — C67.9 MALIGNANT NEOPLASM OF URINARY BLADDER, UNSPECIFIED SITE (HCC): ICD-10-CM

## 2022-05-28 PROCEDURE — 74178 CT ABD&PLV WO CNTR FLWD CNTR: CPT

## 2022-05-28 PROCEDURE — 71260 CT THORAX DX C+: CPT

## 2022-05-28 PROCEDURE — 82565 ASSAY OF CREATININE: CPT

## 2022-05-28 PROCEDURE — 74011000636 HC RX REV CODE- 636: Performed by: UROLOGY

## 2022-05-28 RX ADMIN — IOPAMIDOL 100 ML: 755 INJECTION, SOLUTION INTRAVENOUS at 15:18

## 2022-05-30 LAB — CREAT UR-MCNC: 1 MG/DL (ref 0.6–1.3)

## 2022-05-31 NOTE — PROGRESS NOTES
Discussed case with Dr. Margaret Schroeder, hematology oncology. We will plan for cystectomy in approximately 6 weeks and stop Eliquis 3 days prior to procedure. There was some confusion regarding whether PE was present or not and after her discussion with radiology it was felt there was a small PE in a segmental vessel.

## 2022-06-02 ENCOUNTER — OFFICE VISIT (OUTPATIENT)
Dept: PULMONOLOGY | Age: 77
End: 2022-06-02

## 2022-06-02 VITALS — HEIGHT: 59 IN | BODY MASS INDEX: 21.57 KG/M2 | WEIGHT: 107 LBS

## 2022-06-02 DIAGNOSIS — J43.2 CENTRILOBULAR EMPHYSEMA (HCC): ICD-10-CM

## 2022-06-02 DIAGNOSIS — R06.09 DYSPNEA ON EXERTION: ICD-10-CM

## 2022-06-02 PROCEDURE — 94618 PULMONARY STRESS TESTING: CPT | Performed by: INTERNAL MEDICINE

## 2022-06-02 PROCEDURE — 94727 GAS DIL/WSHOT DETER LNG VOL: CPT | Performed by: INTERNAL MEDICINE

## 2022-06-02 PROCEDURE — G8756 NO BP MEASURE DOC: HCPCS | Performed by: INTERNAL MEDICINE

## 2022-06-02 PROCEDURE — 99214 OFFICE O/P EST MOD 30 MIN: CPT | Performed by: INTERNAL MEDICINE

## 2022-06-02 PROCEDURE — 94729 DIFFUSING CAPACITY: CPT | Performed by: INTERNAL MEDICINE

## 2022-06-02 PROCEDURE — G8420 CALC BMI NORM PARAMETERS: HCPCS | Performed by: INTERNAL MEDICINE

## 2022-06-02 PROCEDURE — G8536 NO DOC ELDER MAL SCRN: HCPCS | Performed by: INTERNAL MEDICINE

## 2022-06-02 PROCEDURE — 1090F PRES/ABSN URINE INCON ASSESS: CPT | Performed by: INTERNAL MEDICINE

## 2022-06-02 PROCEDURE — 1123F ACP DISCUSS/DSCN MKR DOCD: CPT | Performed by: INTERNAL MEDICINE

## 2022-06-02 PROCEDURE — 94060 EVALUATION OF WHEEZING: CPT | Performed by: INTERNAL MEDICINE

## 2022-06-02 PROCEDURE — 1101F PT FALLS ASSESS-DOCD LE1/YR: CPT | Performed by: INTERNAL MEDICINE

## 2022-06-02 PROCEDURE — G8510 SCR DEP NEG, NO PLAN REQD: HCPCS | Performed by: INTERNAL MEDICINE

## 2022-06-02 PROCEDURE — G8427 DOCREV CUR MEDS BY ELIG CLIN: HCPCS | Performed by: INTERNAL MEDICINE

## 2022-06-02 RX ORDER — UMECLIDINIUM BROMIDE AND VILANTEROL TRIFENATATE 62.5; 25 UG/1; UG/1
1 POWDER RESPIRATORY (INHALATION) DAILY
Qty: 60 EACH | Refills: 5 | Status: SHIPPED | OUTPATIENT
Start: 2022-06-02

## 2022-06-02 NOTE — LETTER
6/13/2022    Patient: Veronique Jaffe   YOB: 1945   Date of Visit: 6/2/2022     Mary Fernández MD  [address]  [address] [place] [id number]  Via Fax: [phone]    Dear Mary Fernández MD,      Thank you for referring Ms. Veronique Jaffe to [address] [place] [address] for evaluation. My notes for this consultation are attached. If you have questions, please do not hesitate to call me. I look forward to following your patient along with you.       Sincerely,    Giulia Hinton, DO

## 2022-06-02 NOTE — PATIENT INSTRUCTIONS
What is the plan?  -Continue on Anoro as prescribed  -Use Albuterol rescue inhaler only if needed for shortness of breath   -Continue Eliquis as prescribed  -Start on Pulmonary Rehabilitation  -Please get pneumonia vaccine   -Continue to follow-up with your other providers       Chronic Obstructive Pulmonary Disease (COPD): Care Instructions  Overview     Chronic obstructive pulmonary disease (COPD) is a lung disease that makes it hard to breathe. With COPD, the airways that lead to the lungs are narrowed, and the tiny air sacs in the lungs are damaged and lose their stretch. People with COPD have decreased airflow in and out of the lungs, which makes it hard to breathe. The airways also can get clogged with thick mucus. Cigarette smoking is a major cause of COPD. Although there is no cure for COPD, you can slow its progress. Following your treatment plan and taking care of yourself can help you feel better and live longer. Follow-up care is a key part of your treatment and safety. Be sure to make and go to all appointments, and call your doctor if you are having problems. It's also a good idea to know your test results and keep a list of the medicines you take. How can you care for yourself at home? Staying healthy    · Do not smoke. This is the most important step you can take to prevent more damage to your lungs. If you need help quitting, talk to your doctor about stop-smoking programs and medicines. These can increase your chances of quitting for good.     · Avoid colds and other infections. Get the pneumococcal and whooping cough (pertussis) vaccines. If you have had these vaccines before, ask your doctor if you need another dose. Get the flu vaccine every fall. If you must be around people with colds or the flu, wash your hands often.     · Avoid secondhand smoke and air pollution. Try to stay inside with your windows closed when air pollution is bad.    Medicines and oxygen therapy    · Take your medicines exactly as prescribed. Call your doctor if you think you are having a problem with your medicine. You may be taking medicines such as:  ? Bronchodilators. These help open your airways and make breathing easier. They are either short-acting (work for 4 to 9 hours) or long-acting (work for 12 to 24 hours). You inhale most bronchodilators, so they start to act quickly. Always carry your quick-relief inhaler with you in case you need it. ? Corticosteroids (prednisone, budesonide). These reduce airway inflammation. They come in inhaled or pill form.     · Ask your doctor or pharmacist if you are using each type of inhaler correctly. With correct use, the medicine is more likely to get to your lungs.     · See if a spacer is right for you. A spacer may also help you get more inhaled medicine to your lungs. If you use one, ask how to use it properly.     · Do not take any vitamins, over-the-counter medicine, or herbal products without talking to your doctor first.     · If your doctor prescribed antibiotics, take them as directed. Do not stop taking them just because you feel better. You need to take the full course of antibiotics.     · If you use oxygen therapy, use the flow rate your doctor has recommended. Don't change it without talking to your doctor first. Oxygen therapy boosts the amount of oxygen in your blood and helps you breathe easier. Activity    · Get regular exercise. Walking is an easy way to get exercise. Start out slowly, and walk a little more each day.     · Pay attention to your breathing. You are exercising too hard if you can't talk while you exercise.     · Take short rest breaks when doing household chores and other activities.     · Learn breathing methodssuch as breathing through pursed lipsto help you become less short of breath.     · If your doctor has not set you up with a pulmonary rehabilitation program, ask if rehab is right for you.  Rehab includes exercise programs, education about your disease and how to manage it, help with diet and other changes, and emotional support. Diet    · Eat regular, healthy meals.     · Use bronchodilators about 1 hour before you eat to make it easier to eat.     · Eat several smaller, frequent meals to prevent getting too full. A full stomach can push on the muscle that helps you breathe (your diaphragm) and make it harder to breathe.     · Drink beverages at the end of the meal.     · Avoid foods that are hard to chew.     · Eat foods that contain protein so you don't lose muscle mass.     · Talk with your doctor if you gain too much weight or if you lose weight without trying. Mental health    · Talk to your family, friends, or a therapist about your feelings. Some people feel frightened, angry, hopeless, helpless, and even guilty. Talking openly about feelings may help you cope. If these feelings last, talk to your doctor. When should you call for help? Call 911 anytime you think you may need emergency care. For example, call if:    · You have severe trouble breathing.     · You are having chest pain that is different or worse than usual.   Call your doctor now or seek immediate medical care if:    · You have new or worse trouble breathing.     · You cough up blood.     · You have a fever.     · You have feelings of anxiety or depression. Watch closely for changes in your health, and be sure to contact your doctor if:    · You cough more deeply or more often, especially if you notice more mucus or a change in the color of your mucus.     · You have new or worse swelling in your legs or belly.     · You are not getting better as expected. Where can you learn more? Go to http://gabby-hiro.info/  Enter R514 in the search box to learn more about \"Chronic Obstructive Pulmonary Disease (COPD): Care Instructions. \"  Current as of: July 6, 2021               Content Version: 13.2  © 3890-2160 [employer]wise, Incorporated. Care instructions adapted under license by [employer] (which disclaims liability or warranty for this information). If you have questions about a medical condition or this instruction, always ask your healthcare professional. [address] any warranty or liability for your use of this information.

## 2022-06-02 NOTE — PROGRESS NOTES
Sheyla Puentes presents today for   Chief Complaint   Patient presents with    COPD     Centrilobular emphysema    Lung Cancer       Is someone accompanying this pt? Demar Tyson    Is the patient using any DME equipment during [facility] NA    Depression Screening:  3 most recent [facility] Screens 6/2/2022   Little interest or pleasure in doing things Not at all   Feeling down, depressed, irritable, or hopeless Not at all   Total Score PHQ 2 0       Learning Assessment:  Learning Assessment 1/7/2014   PRIMARY LEARNER Patient   BARRIERS PRIMARY LEARNER NONE   CO-LEARNER CAREGIVER Yes   CO-LEARNER NAME daughter   PRIMARY LANGUAGE ENGLISH   LEARNER PREFERENCE PRIMARY DEMONSTRATION     LISTENING       Abuse Screening:  No flowsheet data found. Fall Risk  Fall Risk Assessment, last 12 mths 3/30/2022   Able to walk? Yes   Fall in past 12 months? 0   Do you feel unsteady? 0   Are you worried about falling 0         Coordination of Care:  1. Have you been to the ER, urgent care clinic since your last visit? Hospitalized since your last visit? 05/22/22 went to er at  due to bloodclot in chest    2. Have you seen or consulted any other health care providers outside of the [address] [place] [address] since your last visit? Include any pap smears or colon screening.  No

## 2022-06-02 NOTE — PROGRESS NOTES
[facility]                                                       Pulmonary, Critical Care, and Sleep Medicine      Pulmonary Office Progress Notes. Name: Tameka Mohan     : 1945     Date: 2022        Subjective:   Patient has been referred for evaluation of: SOB  Patient is a 64 y.o. female with PMHx of Adenocarcinoma of the Lung, COPD, Urethelial cancer of the bladder, HLD, HTN, Osteoporosis, hypothyroidism, esophageal dysmotility and CKD. HPI (22): In there interim since her last visit, she was seen in the ED for SOB and abnormal CT chest with suggestive of PE. Seen by Heme/Onc - started on Eliquis 5 mg BID. Today in clinic, she states she continues to feel tired. States that since starting her Anoro, her breathing has significantly improved. Notes some intermittent cough which is nonproductive. No fever, chills, chest pain/pressure or abdominal distress. She admits to some fluctuations in weight but overall not significant. Currently on:  -Anoro - using once daily as prescribed.  -Albuterol rescue inhaler - using every 4 hours but feels like she doesn't need it this often. Planning to have surgical resection of her bladder in Aug, 2022. HPI (3/30/2022)  Of note, has a h/o poorly differentiated RLL Adenocarcinoma () - s/p RT ( - 2014) and repeat RT (16-16); deemed not surgical candidate d/t FEV1 - rpt CT chest (2022) w/ stable post-treatment changes in the RLL. T2N0 Urothelial cancer - Chemo (Cisplatine/gemcitabine). Oncologist - Dr. Gila Alejandro    Today in clinic, she states that she is here to establish care. She states that she has been having some shortness of breath. Dyspnea is worse with going up the stairs, getting in and out of the shower, and walking. She is not not able to walk from her bedroom to her living room without dyspnea.   Symptoms are typically relieved by rest for approximately 5 minutes. She has also noted some shortness of breath while undergoing chemotherapy. Symptoms associated with an intermittent cough which is nonproductive and wheezing. She denies chest pain/pressure or chest tightness, fever or chills. She does admit to weight loss since starting chemotherapy; unknown amount of weight loss. Currently on:   -Albuterol rescue inhaler - using 2 puff every 4 hours  -Breo-ellipta -just received; have not started using it yet. Tobacco/Substance/Vape: former smoker; quit 20 yrs ago; 1/2 ppd x43 years. No vape, hookah, or MJ. Occupation: retired; homemaker  Travel: none  Pets: none  TB exposure/testing: no known exposure  VTE/DVT history: none   service: none  Hobbies: [employer]  Other exposures: no other exposures  Autoimmune disease: none in self or family  COVID-19 vaccination: s/p Moderna x3 doses  Marital status:     Of note, patient is here with her daughter who supplements HPI. Past Medical History:   Diagnosis Date    Arthritis     Knees and left shoulder    [facility]     Cancer ([facility] 1978    squamous cell CA skin    Chronic kidney disease     Stage 3a chronic kidney disease    Chronic obstructive pulmonary disease (HCC)     High cholesterol     Hypertension     Lung cancer ([facility] 4/9/2014    Malignant neoplasm of bladder (HCC) 01/23/2017    High grade TCC, s/p TURBT small +Mitomycin-C instillation -- Dr. Rupert Lacy Mass of bladder     Pulmonary nodule 1/4/14    Radiation     Recurrent UTI     Thyroid disease     Hypothyroidism       Past Surgical History:   Procedure Laterality Date    COLONOSCOPY N/A 12/6/2021    COLONOSCOPY performed by Carli Christine MD at [facility]    HX APPENDECTOMY      HX COLONOSCOPY  2002    HX CYST REMOVAL Left     Breast    HX GYN      hysterectomy    HX HYSTERECTOMY  1994    HX OTHER SURGICAL      skin cancer removed.      HX OTHER SURGICAL      biopsy on thyroid     HX TUBAL LIGATION      HX UROLOGICAL  07/03/2017    Cysto, TURBT small, Bladder mapping biopsies with fulguration --  [facility] at [address]  12/13/2021     CYSTOSCOPY , TRANSURETHRAL RESECTION BLADDER TUMOR ,BILATERAL RETROGRADE PYELOGRAM,BLADDER Loman Sleigh; Dr. Betty Grullon      Radiosurgery       Social History     Socioeconomic History    Marital status:    Tobacco Use    Smoking status: Former Smoker     Packs/day: 0.50     Years: 40.00     Pack years: 20.00     Quit date: 2/7/2007     Years since quitting: 15.3    Smokeless tobacco: Never Used   Vaping Use    Vaping Use: Never used   Substance and Sexual Activity    Alcohol use: Yes     Alcohol/week: 5.0 - 6.0 standard drinks     Types: 1 - 2 Cans of beer, 4 Standard drinks or equivalent per week     Comment: occ    Drug use: Never    Sexual activity: Not Currently     Partners: Male       Family History   Problem Relation Age of Onset    Hypertension Mother     Heart Attack Sister        Allergies   Allergen Reactions    Augmentin [Amoxicillin-Pot Clavulanate] Itching       Current Outpatient Medications   Medication Sig Dispense Refill    apixaban (Eliquis) 5 mg tablet Take 5 mg by mouth two (2) times a day.  umeclidinium-vilanteroL (Anoro Ellipta) 62.5-25 mcg/actuation inhaler Take 1 Puff by inhalation daily. 60 Each 5    meclizine (ANTIVERT) 25 mg tablet       lidocaine-prilocaine (EMLA) topical cream APPLY CREAM GENEROUSLY OVER PORT SITE AND COVER WITH PLASTIC AT LEAST 1 HOUR PRIOR TO EACH TREATMENT      dexAMETHasone (DECADRON) 4 mg tablet Take 4 mg by mouth every twelve (12) hours.  prochlorperazine (COMPAZINE) 10 mg tablet Take 10 mg by mouth every six (6) hours as needed for Nausea or Vomiting.  ondansetron hcl (Zofran) 8 mg tablet Take 8 mg by mouth every eight (8) hours as needed for Nausea or Vomiting.       docusate sodium (COLACE) 100 mg capsule Take 100 mg by mouth two (2) times daily as needed for Constipation.  famotidine (PEPCID) 20 mg tablet Take 20 mg by mouth daily.  omeprazole (PRILOSEC) 40 mg capsule       potassium chloride (KAON 10%) 20 mEq/15 mL solution 20 meq daily/15 ml      meloxicam (MOBIC) 15 mg tablet 15 mg.      atorvastatin (LIPITOR) 40 mg tablet TK 1 T PO QHS  5    PROAIR HFA 90 mcg/actuation inhaler INHALE 2 PUFFS PO Q 4 H  0    CALCIUM CARBONATE (CALCIUM 600 PO) Take  by mouth.  cholecalciferol (VITAMIN D3) 1,000 unit tablet Take  by mouth daily.  ibandronate (BONIVA) 150 mg tablet Take 150 mg by mouth every thirty (30) days.  amLODIPine (NORVASC) 5 mg tablet Take 5 mg by mouth daily. Review of Systems:  HEENT: No epistaxis, no nasal drainage, no difficulty in swallowing, no redness in eyes  Respiratory: as above  Cardiovascular: no chest pain, no palpitations, no chronic leg edema, no syncope  Gastrointestinal: no abd pain, no vomiting, no diarrhea, no bleeding symptoms  Genitourinary: No urinary symptoms or hematuria  Integument/breast: No ulcers or rashes  Musculoskeletal: Neg  Neurological: No focal weakness, no seizures, no headaches  Behvioral/Psych: No anxiety, no depression  Constitutional: as above    Objective:     Visit Vitals  Ht 4' 11\" (1.499 m)   Wt 48.5 kg (107 lb)   BMI 21.61 kg/m²        Physical Exam:   General: comfortable, no acute distress  HEENT: pupils reactive, sclera anicteric, EOM intact, moist mucus membranes  Neck: No adenopathy or thyroid swelling, no lymphadenopathy or JVD, supple  CVS: S1S2 no murmurs. +Tachycardia. RS: Clear to auscultation bilaterally. No wheezes. No tachypnea or accessory muscle use. Abd: soft, non tender, no hepatosplenomegaly  Neuro: non focal, awake, alert  Extrm: no leg edema or cyanosis. +Digital clubbing.   Skin: no rash    Data review:   Pertinent labs: CBC, BMP, LFT's  Serologies (ILD, JULIANA), IgE, allergy panel, alpha-1 antitrypsin, ACE level: Not available for review    PFT:  Date_____FEV1______FVC___FEV1/FVC___BDFEV1___BDFVC___pstBDratio  2014. ...0. 66(42%). ....1. 61(79%). .. ..41%. .....0.69(44%). ....1. 59(78%). .... 43%  2022. ...0. 68(57%). ....1. 54(99%). .. Davin Rich Davin Rich 44%. ..... 076(64%). ....1. 77(114%). .... 43%    Date_____TLC_________RV________RV/TLC  2022. ...4.0(118%). ....2.38(130%). .... 59%    Date_____DLCO  2022. ...6. 43(38%) corrected    Six Minute Walk Test: 22  Total walking distance (m): 237.8  FiO2: RA  Tyler (%): 89  Peak HR: 149  Number of rest periods: 0  Reason for termination: Test completion  HR One minute post exercise: 117    Echocardiogram  Date: 3/3/22  CONCLUSIONS     * Left ventricular systolic function is hyperdynamic with an ejection fraction of 70 % by visual estimation.     * Left ventricular diastolic function: normal.     * Longitudinal strain of the left ventricle is  -10.7 %.   * Left ventricular chamber size is decreased.     * There is concentric left ventricular hypertrophy with a mildly thickened septal wall and mildly thickened posterior wall.     * Right ventricular systolic function is normal with TAPSE measuring 1.81 cm.     * Right ventricular chamber dimension is normal.     * No hemodynamically significant valvular disease. Comparison     * No prior study is available for comparison.      Imaging:  I have personally reviewed the patients radiographs and have reviewed the reports:  XR Results (most recent):  Results from Abstract encounter on 21    XR CHEST PA LAT    [address] [address]    Imaging Result    Name:    Gifty Begum ([id number])    Sex: Female    :    1945    Ordering Provider: Genet ALMAZAN Provider:    Diagnosis:    Malignant neoplasm of bladder, unspecified ([facility] [C67.9 (ICD-10-CM)]    None Specified    Procedures Performed:    CHEST AP AND LATERAL    [id number]    Exam Date/Time:    12/01/2021 11:24 AM      EXAM: CHEST RADIOGRAPHS    CLINICAL INDICATION/HISTORY: Provided with order -   C67.9: Malignant neoplasm of bladder, unspecified (HCC)    > Additional: Preop evaluation for lung biopsy. COMPARISON: None    TECHNIQUE: AP and lateral views of the chest  _______________    FINDINGS:    HEART AND MEDIASTINUM: No appreciable cardiomegaly. Remaining mediastinal contours within normal limits. Tortuous aorta. LUNGS AND PLEURAL SPACES: Hyperinflation with flattening of diaphragm compatible with emphysema. Right infrahilar mass. Atelectatic or fibrotic streaks right lung base. BONY THORAX AND SOFT TISSUES: Unremarkable.  _______________    IMPRESSION    Central right lower lobe pulmonary mass. Few atelectatic or fibrotic streaks right base. Emphysema. Signed By: Bharat Hawthorne MD on 12/1/2021 4:25 PM    Dictated by: Tiffany Bah on Wed Dec 1, 2021  4:24:09 PM EST    Signed By:Qasim Crockett MD  12/1/2021  4:25 PM    [facility] [[id number]]    ~~This report was copied and pasted from the servicing EHR system. ~~      CT Results (most recent):  Results from [place] encounter on 05/28/22    CT CHEST W CONT    Addendum 5/29/2022 10:44 AM  Addendum: #1, 2 of the impression was called to Dr. Yazan Mcknight by the [facility]  facilitator at 10:38 AM on 5/29/2022. Narrative  CT CHEST WITH ENHANCEMENT    INDICATION: Malignant neoplasm of urinary bladder, unspecified site. TECHNIQUE: Axial images obtained from the thoracic inlet to the level of the  diaphragm following uneventful administration of intravenous contrast. Coronal  and sagittal reformatted images were obtained.   All CT scans are performed using  dose optimization techniques as appropriate to the performed exam including the  following: Automated exposure control, adjustment of mA and/or kV according to  patient size, and use of iterative reconstructive technique. COMPARISON: CT chest 1/26/2022. CHEST FINDINGS:    Lungs: Centrilobular emphysema. The masslike right perihilar consolidation  measures approximate 4.6 x 2.7 cm and has slightly decreased in size. No new  mass or acute findings. Pleura: No effusion. Lymph Nodes: Unremarkable. Cardiovascular: Arterial wall calcifications, including coronary arteries. No  aortic aneurysm or dissection. Aberrant right subclavian artery variant. There  is a new peripheral small thrombus in the distal descending aorta measuring 9 x  16 mm on image 42. Right jugular line. New nonocclusive peripheral filling  defect in the right lower lobar artery compatible with a nonacute pulmonary  embolus. Heart strain assessment: No new findings. -RV: LV ratio is increased but is stable to prior.  -Pulmonary artery is not enlarged.  -No intraventricular septal dysfunction/displacement. -Mild reflux of contrast into the IVC, stable to prior. Thyroid: Stable left thyroid nodules with the largest posterior nodule measuring  2.7 cm extending into the left superior mediastinum. Bones/soft tissues: Unremarkable. Upper Abdomen: Refer to today's CT urogram separate report. Impression  1. New right lower lobar pulmonary embolism. It appears peripheral which would  favor a chronic over acute thrombus but is new since 1/2022. Heart strain  assessment as discussed above. 2. New mild peripheral mural thrombus in the descending thoracic aorta. 3. Slightly decreased size of the right perihilar consolidation. 4. Emphysema. 5. Stable left thyroid nodules.        Patient Active Problem List   Diagnosis Code    Lung nodule R91.1    HTN (hypertension) I10    Abnormal EKG R94.31    Chronic airway obstruction, not elsewhere classified J44.9    Lung mass R91.8    Shortness of breath R06.02    Lung cancer ([address] C34.90    Urothelial carcinoma ([address] C68.9    Incomplete bladder emptying R33.9    Malignant neoplasm of urinary bladder (HCC) C67.9    Arthritis M19.90    Esophageal dysmotility K22.4    Hyperlipidemia E78.5    Hypothyroid E03.9    Osteoporosis M81.0    Malignant neoplasm of skin C44.90    Stage 3 chronic kidney disease (HCC) N18.30       IMPRESSION:   · COPD: Gold stage III; PFT w/ FEV1 0.68 L (57%) / FVC 1.54 L (99%). Symptoms adequately controlled on LABA/LAMA + prn DEBORA. · Pulmonary embolism, acute: new RLL pulmonary embolism as noted on CT chest (5/28/22). On Eliquis. Given underlying malignancy, will likely require life-long AC. Following with Heme/Onc. · Dyspnea on exertion: likely multifactorial; suspect history of COPD with severe obstructive defect along with adenocarcinoma likely contributory. Most recent lab work without anemia. · H/o Adenocarcinoma of the RLL (2014): s/p RT x2 and repeat imaging with stable posttreatment changes. · Urothelial/bladder carcinoma: Currently on chemotherapy (Cisplatine/gemcitabine). Following with oncologist - Dr. Yoshi Prather. Bladder resection planned for Aug, 2022. · H/o tobacco abuse: 21.5 pack year; quit 20 yrs ago  · Comorbid conditions: CKD, osteoporosis, hypothyroidism      RECOMMENDATIONS:   · Continue Eliquis per Heme/Onc reccs  · Continue Anoro Ellipta 1 puff daily and albuterol rescue inhaler 1 to 2 puffs every 4-6 hours as needed for shortness of breath  · PFT & SMW discussed in clinic today  · Pulmonary rehab - referral placed  · Vaccination: recommend all age-appropriate immunizations  · Continue follow-up with all of your other providers  · Follow up - 3 months & prn.     Education:  Inhaler techniques, MDI/spacers, nebulizers use, goal setting, monitoring  Reducing anxiety, energy conservation tips, exercise, medications and Nutrition    Health maintenance screens deferred to Primary care provider.      Darren Calderon,   6/13/2022  Pulmonary, Critical Care Medicine  [facility]

## 2022-06-22 ENCOUNTER — DOCUMENTATION ONLY (OUTPATIENT)
Dept: PULMONOLOGY | Age: 77
End: 2022-06-22

## 2022-07-08 NOTE — ADDENDUM NOTE
Addendum  created 01/23/17 1300 by Latosha Roth CRNA    Anesthesia Event deleted, Anesthesia Intra Meds edited, Orders acknowledged in Narrator, Procedure Event Log accessed right hip/complains of pain/discomfort

## 2022-07-27 ENCOUNTER — DOCUMENTATION ONLY (OUTPATIENT)
Dept: PULMONOLOGY | Age: 77
End: 2022-07-27

## 2022-07-27 NOTE — PROGRESS NOTES
Received request from patient's urologist requesting pulmonary clearance for planned da Narendra assisted left cystectomy with urinary diversion and extended pelvic lymph node dissection scheduled for 8/17/2022 at [facility].    The following preop assessment was performed based on patient's clinic evaluation on 6/2/2022. Co morbidities: COPD (GOLD stage III), PE (on Eliquis), CKD, Hypothyroidism, Osteoporosis, & prior Adenocarcinoma of the Lung s/p RT x2. PREOP RISK CALCULATORS  During Pre-Op assessment 3 major indices / scoring system were taken in to consideration as discussed below. A] ASA Class  Class       Pulmonary Complications  1 Healthy     1.2%  2 Mild Systemic Disease   5.4%  3 Severe Systemic Disease, limits  11.4%   activity, but not incapacitating    4 Incapacitating systemic disease,  10.9%   which is a constant threat to life  5 Moribund, not expected to survive  NA   24 hrs with or without surgery   Patient's AHA class is 3  ---------------------------------------------------------------------------------------------------------------------  B] [facility] was calculated as follows:   Type of Surgery  AAA   27   Thoracic   21   Neurosurgery  14  Upper Abdominal  14  Peripheral Vascular 14  Neck   14  Emergency Surgery   11  Albumin < 3.0 g/dL   9  BUN      > 30 mg/dL   8  Partial/Full Dependence  7  History of COPD   6  Age > 70    6  Age 61 - 67    5    Class  Point Total  % Respiratory Failure (Dorys-operative)  One   <= 10   0.5  Two  11 - 19  1.8  Three  20 - 27  4.2  Four  28 - 40  10.1  Five  > 40   26.6  [facility] Index for Respiratory failure Class is Two  -------------------------------------------------------------------------------------------------------------------  C] ARISCAT  Factor        Score  Age, years    ? 50        -   51-80             3    >80            16   Preoperative O2 saturation    ? 96 percent     91-95 percent        8    ?90 percent         24   Respiratory infection in the last month     17   Preoperative anemia - hemoglobin ?10 g/dL  11   Surgical incision    Upper abdominal      15    Intrathoracic        24   Duration of surgery    ?2 hours     2-3 hours       16    >3 hours       23   Emergency surgery       8       Risk class Number of points in risk score Pulmonary complication rate  (validation sample) Low <26 points 1.6 percent Intermediate 26-44 points 13.3 percent High ?45 points 42.1 percent  Patient's ARISCAT score is 26    Summary of Pre-Op assessment[de-identified]   - ASA Class 3, Respiratory failure index Two, ARISCAT score 26 (Intermediate). - Based on above mentioned data; patient is at Moderate to High risk of sweta-operative pulmonary complications including respiratory failure. - Above mentioned indicies doesn't account for Morbid Obesity, Mallampati class for difficulty to manage airway which adds to above mentioned risk. - Patient should be made aware of all this pre-operatively. It is a patient and surgeon's decision to proceed with surgery with understanding of risk benefit ratio depending on kind of surgery and necessity of it. Above pre-op assessment includes only pulmonary evaluation.  For cardiac or other non-pulmonary evaluation, patient should be evaluated by PCP or cardiologist.       Betsy Beckwith DO   07/27/22  Pulmonary, [address] [facility]

## 2022-09-22 ENCOUNTER — OFFICE VISIT (OUTPATIENT)
Dept: PULMONOLOGY | Age: 77
End: 2022-09-22
Payer: MEDICARE

## 2022-09-22 VITALS
SYSTOLIC BLOOD PRESSURE: 144 MMHG | RESPIRATION RATE: 16 BRPM | OXYGEN SATURATION: 96 % | TEMPERATURE: 97.1 F | HEIGHT: 59 IN | DIASTOLIC BLOOD PRESSURE: 80 MMHG | BODY MASS INDEX: 19.94 KG/M2 | HEART RATE: 119 BPM | WEIGHT: 98.9 LBS

## 2022-09-22 DIAGNOSIS — R06.09 DYSPNEA ON EXERTION: ICD-10-CM

## 2022-09-22 DIAGNOSIS — Z85.118 HISTORY OF LUNG CANCER: ICD-10-CM

## 2022-09-22 DIAGNOSIS — Z87.891 HISTORY OF TOBACCO ABUSE: ICD-10-CM

## 2022-09-22 DIAGNOSIS — J43.2 CENTRILOBULAR EMPHYSEMA (HCC): Primary | ICD-10-CM

## 2022-09-22 PROCEDURE — 1090F PRES/ABSN URINE INCON ASSESS: CPT | Performed by: INTERNAL MEDICINE

## 2022-09-22 PROCEDURE — G8427 DOCREV CUR MEDS BY ELIG CLIN: HCPCS | Performed by: INTERNAL MEDICINE

## 2022-09-22 PROCEDURE — 1101F PT FALLS ASSESS-DOCD LE1/YR: CPT | Performed by: INTERNAL MEDICINE

## 2022-09-22 PROCEDURE — G8536 NO DOC ELDER MAL SCRN: HCPCS | Performed by: INTERNAL MEDICINE

## 2022-09-22 PROCEDURE — G8753 SYS BP > OR = 140: HCPCS | Performed by: INTERNAL MEDICINE

## 2022-09-22 PROCEDURE — 1123F ACP DISCUSS/DSCN MKR DOCD: CPT | Performed by: INTERNAL MEDICINE

## 2022-09-22 PROCEDURE — G8420 CALC BMI NORM PARAMETERS: HCPCS | Performed by: INTERNAL MEDICINE

## 2022-09-22 PROCEDURE — 99213 OFFICE O/P EST LOW 20 MIN: CPT | Performed by: INTERNAL MEDICINE

## 2022-09-22 PROCEDURE — G8754 DIAS BP LESS 90: HCPCS | Performed by: INTERNAL MEDICINE

## 2022-09-22 PROCEDURE — G8432 DEP SCR NOT DOC, RNG: HCPCS | Performed by: INTERNAL MEDICINE

## 2022-09-22 RX ORDER — GLUCOSAMINE SULFATE 1500 MG
POWDER IN PACKET (EA) ORAL DAILY
COMMUNITY

## 2022-09-22 NOTE — PROGRESS NOTES
[facility]                                                       Pulmonary, Critical Care, and Sleep Medicine      Pulmonary Office Progress Notes. Name: Ed Cardoza     : 1945     Date: 2022        Subjective:   Patient has been referred for evaluation of: SOB  Patient is a 64 y.o. female with PMHx of Adenocarcinoma of the Lung, COPD, Urethelial cancer of the bladder, HLD, HTN, Osteoporosis, hypothyroidism, esophageal dysmotility and CKD. HPI (22): Today in the clinic, she states that she is doing okay overall. She had bladder resection and part of her colon resected as well due to the extension of the cancer onto part of the colon. She was subsequently discharged to rehab but has since gone home. She states that her breathing has been good. She ambulates with a walker and walking from living room to the bathroom, she notes mild dyspnea. No cough, fever, chills, night sweats, chest pain, or wheezing. Notes weight loss of ~ 13 Lbs which she states is likely due to her malignancy. No nigth time sypmtoms. Ligiaenlty on:  -Anoro - using daily  -Albuterol rescue inhaler - does not use at all. She is here today with her daughter who supplements HPI. HPI (22): In there interim since her last visit, she was seen in the ED for SOB and abnormal CT chest with suggestive of PE. Seen by Heme/Onc - started on Eliquis 5 mg BID. Today in clinic, she states she continues to feel tired. States that since starting her Anoro, her breathing has significantly improved. Notes some intermittent cough which is nonproductive. No fever, chills, chest pain/pressure or abdominal distress. She admits to some fluctuations in weight but overall not significant.     Currently on:  -Anoro - using once daily as prescribed.  -Albuterol rescue inhaler - using every 4 hours but feels like she doesn't need it this often.    Planning to have surgical resection of her bladder in Aug, 2022. HPI (3/30/2022)  Of note, has a h/o poorly differentiated RLL Adenocarcinoma (2014) - s/p RT (05/01 - 05/22/2014) and repeat RT (01/11/16-02/01/16); deemed not surgical candidate d/t FEV1 - rpt CT chest (01/2022) w/ stable post-treatment changes in the RLL. T2N0 Urothelial cancer - Chemo (Cisplatine/gemcitabine). Oncologist - Dr. Tracy Lindsey    Today in clinic, she states that she is here to establish care. She states that she has been having some shortness of breath. Dyspnea is worse with going up the stairs, getting in and out of the shower, and walking. She is not not able to walk from her bedroom to her living room without dyspnea. Symptoms are typically relieved by rest for approximately 5 minutes. She has also noted some shortness of breath while undergoing chemotherapy. Symptoms associated with an intermittent cough which is nonproductive and wheezing. She denies chest pain/pressure or chest tightness, fever or chills. She does admit to weight loss since starting chemotherapy; unknown amount of weight loss. Currently on:   -Albuterol rescue inhaler - using 2 puff every 4 hours  -Breo-ellipta -just received; have not started using it yet. Tobacco/Substance/Vape: former smoker; quit 20 yrs ago; 1/2 ppd x43 years. No vape, hookah, or MJ. Occupation: retired; homemaker  Travel: none  Pets: none  TB exposure/testing: no known exposure  VTE/DVT history: none   service: none  Hobbies: Boticca  Other exposures: no other exposures  Autoimmune disease: none in self or family  COVID-19 vaccination: s/p Moderna x3 doses  Marital status:     Of note, patient is here with her daughter who supplements HPI.     Past Medical History:   Diagnosis Date    Arthritis     Knees and left shoulder    Bladder cancer ([facility]     Cancer ([facility] 1978    squamous cell CA skin    Chronic kidney disease     Stage 3a chronic kidney disease Chronic obstructive pulmonary disease (HCC)     High cholesterol     Hypertension     Lung cancer ([facility] 4/9/2014    Malignant neoplasm of bladder ([facility] 01/23/2017    High grade TCC, s/p TURBT small +Mitomycin-C instillation -- Dr. Navarro Beth    Mass of bladder     Pulmonary nodule 1/4/14    Radiation     Recurrent UTI     Thyroid disease     Hypothyroidism       Past Surgical History:   Procedure Laterality Date    COLONOSCOPY N/A 12/06/2021    COLONOSCOPY performed by Carissa Sorto MD at [facility]    HX APPENDECTOMY      [place]  08/17/2022    HX COLONOSCOPY  2002    HX CYST REMOVAL Left     Breast    HX GYN      hysterectomy    HX HYSTERECTOMY  1994    HX OTHER SURGICAL      skin cancer removed. HX OTHER SURGICAL      biopsy on thyroid     HX TUBAL LIGATION      HX UROLOGICAL  07/03/2017    Cysto, TURBT small, Bladder mapping biopsies with fulguration -- Dr. Navarro Beth at [address] [place] [address]  12/13/2021     CYSTOSCOPY , TRANSURETHRAL RESECTION BLADDER TUMOR ,BILATERAL RETROGRADE PYELOGRAM,BLADDER Vera Tracee; Dr. Marilou Childs      Radiosurgery       Social History     Socioeconomic History    Marital status:    Tobacco Use    Smoking status: Former     Packs/day: 0.50     Years: 40.00     Pack years: 20.00     Types: Cigarettes     Quit date: 2/7/2007     Years since quitting: 15.6    Smokeless tobacco: Never   Vaping Use    Vaping Use: Never used   Substance and Sexual Activity    Alcohol use:  Yes     Alcohol/week: 5.0 - 6.0 standard drinks     Types: 1 - 2 Cans of beer, 4 Standard drinks or equivalent per week     Comment: occ    Drug use: Never    Sexual activity: Not Currently     Partners: Male       Family History   Problem Relation Age of Onset    Hypertension Mother     Heart Attack Sister        Allergies   Allergen Reactions    Augmentin [Amoxicillin-Pot Clavulanate] Itching       Current Outpatient Medications Medication Sig Dispense Refill    cholecalciferol (Vitamin D3) 25 mcg (1,000 unit) cap Take  by mouth daily. metoprolol tartrate (LOPRESSOR) 25 mg tablet Take 12.5 mg by mouth. 1/2 of tablet every 8 hrs      acetaminophen (TYLENOL) 500 mg tablet Take  by mouth every six (6) hours as needed for Pain. albuterol (PROVENTIL HFA, VENTOLIN HFA, PROAIR HFA) 90 mcg/actuation inhaler Take  by inhalation. amoxicillin-clavulanate (AUGMENTIN) 875-125 mg per tablet Take 1 Tablet by mouth every twelve (12) hours. 16 Tablet 0    omeprazole (PRILOSEC) 40 mg capsule OPEN 1 CAPSULE AND MIX THE CONTENTS WITH APPLESAUCE AND TAKE BY MOUTH 30 TO 45 MINUTES PRIOR TO BREAKFAST AND DINNER      apixaban (ELIQUIS) 5 mg tablet Take 5 mg by mouth two (2) times a day. umeclidinium-vilanteroL (Anoro Ellipta) 62.5-25 mcg/actuation inhaler Take 1 Puff by inhalation daily. 60 Each 5    atorvastatin (LIPITOR) 40 mg tablet TK 1 T PO QHS  5    CALCIUM CARBONATE (CALCIUM 600 PO) Take  by mouth. ibandronate (BONIVA) 150 mg tablet Take 150 mg by mouth every thirty (30) days.        Review of Systems:  HEENT: No epistaxis, no nasal drainage, no difficulty in swallowing, no redness in eyes  Respiratory: as above  Cardiovascular: no chest pain, no palpitations, no chronic leg edema, no syncope  Gastrointestinal: no abd pain, no vomiting, no diarrhea, no bleeding symptoms  Genitourinary: No urinary symptoms or hematuria  Integument/breast: No ulcers or rashes  Musculoskeletal: Neg  Neurological: No focal weakness, no seizures, no headaches  Behvioral/Psych: No anxiety, no depression  Constitutional: as above    Objective:     Visit Vitals  BP (!) 144/80 (BP 1 Location: Left upper arm, BP Patient Position: Sitting, BP Cuff Size: Small adult)   Pulse (!) 119   Temp 97.1 °F (36.2 °C) (Temporal)   Resp 16   Ht 4' 11\" (1.499 m)   Wt 44.9 kg (98 lb 14.4 oz)   SpO2 96%   BMI 19.98 kg/m²        Physical Exam:   General: comfortable, no acute distress  HEENT: pupils reactive, sclera anicteric, EOM intact, moist mucus membranes  Neck: No adenopathy or thyroid swelling, no lymphadenopathy or JVD, supple  CVS: S1S2 no murmurs. +Tachycardia. RS: Clear to auscultation bilaterally. No wheezes. No tachypnea or accessory muscle use. Abd: soft, non tender, no hepatosplenomegaly  Neuro: non focal, awake, alert  Extrm: no leg edema or cyanosis. +Digital clubbing. Skin: no rash    Data review:   Pertinent labs: CBC, BMP, LFT's  Serologies (ILD, JULIANA), IgE, allergy panel, alpha-1 antitrypsin, ACE level: Not available for review    PFT:  Date_____FEV1______FVC___FEV1/FVC___BDFEV1___BDFVC___pstBDratio  01/2014. ...0. 66(42%). ....1. 61(79%). .. ..41%. .....0.69(44%). ....1. 59(78%). .... 43%  06/2022. ...0. 68(57%). ....1. 54(99%). .. Pinky Angles Pinky Angles 44%. ..... 076(64%). ....1. 77(114%). .... 43%    Date_____TLC_________RV________RV/TLC  06/2022. ...4.0(118%). ....2.38(130%). .... 59%    Date_____DLCO  06/2022. ...6. 43(38%) corrected    Six Minute Walk Test: 6/2/22  Total walking distance (m): 237.8  FiO2: RA  Tyler (%): 89  Peak HR: 149  Number of rest periods: 0  Reason for termination: Test completion  HR One minute post exercise: 117    Echocardiogram  Date: 3/3/22  CONCLUSIONS     * Left ventricular systolic function is hyperdynamic with an ejection fraction of 70 % by visual estimation. * Left ventricular diastolic function: normal.     * Longitudinal strain of the left ventricle is  -10.7 %. * Left ventricular chamber size is decreased. * There is concentric left ventricular hypertrophy with a mildly thickened septal wall and mildly thickened posterior wall. * Right ventricular systolic function is normal with TAPSE measuring 1.81 cm. * Right ventricular chamber dimension is normal.     * No hemodynamically significant valvular disease. Comparison     * No prior study is available for comparison.      Imaging:  I have personally reviewed the patients radiographs and have reviewed the reports:  XR Results (most recent):  Results from Abstract encounter on 21    XR CHEST PA LAT    [address] [address]    Imaging Result    Name:    Diana Gross ([id number])    Sex: Female    :    1945    Ordering Provider: Bertrand ALMAZAN Provider:    Diagnosis:    Malignant neoplasm of bladder, unspecified ([facility] [C67.9 (ICD-10-CM)]    None Specified    Procedures Performed:    CHEST AP AND LATERAL    [id number]    Exam Date/Time:    2021 11:24 AM      EXAM: CHEST RADIOGRAPHS    CLINICAL INDICATION/HISTORY: Provided with order -   C67.9: Malignant neoplasm of bladder, unspecified ([facility]    > Additional: Preop evaluation for lung biopsy. COMPARISON: None    TECHNIQUE: AP and lateral views of the chest  _______________    FINDINGS:    HEART AND MEDIASTINUM: No appreciable cardiomegaly. Remaining mediastinal contours within normal limits. Tortuous aorta. LUNGS AND PLEURAL SPACES: Hyperinflation with flattening of diaphragm compatible with emphysema. Right infrahilar mass. Atelectatic or fibrotic streaks right lung base. BONY THORAX AND SOFT TISSUES: Unremarkable.  _______________    IMPRESSION    Central right lower lobe pulmonary mass. Few atelectatic or fibrotic streaks right base. Emphysema. Signed By: Kuldip Potter MD on 2021 4:25 PM    Dictated by: Melissa Tillman on Wed Dec 1, 2021  4:24:09 PM EST    Signed By:Dae Crockett MD  2021  4:25 PM    [facility] [[id number]]    ~~This report was copied and pasted from the servicing EHR system. ~~      CT Results (most recent):  Results from Abstract encounter on 22    CT ABD PELV WO CONT    Impression  SEND REPORT TO:  Hayes Biswas M.D.  [address], [place] [address], [address]      FAX:  [phone] Name:  Yajaira Abdi      :  1945  Gender:  Female  Exam Date: August 03, 2022  Referring Provider:  Becky Cummings M.D.      CT STUDY OF THE ABDOMEN AND PELVIS      HISTORY: 75-year-old female  SYMPTOMS: Gross hematuria  REFERRING DIAGNOSIS: Urothelial carcinoma  TECHNIQUE: Helical multislice was performed without contrast given low G F R . All MRI & CT Diagnostics CT scans are performed using one of these three dose reduction techniques: automated exposure control, adjustment of the mA and/or kV according to patient size, or use of iterative reconstruction techniques. COMPARISON: None at this institution      FINDINGS:      Liver: Within normal limits. Biliary tree: Within normal limits. Gallbladder:  Within normal limits. Spleen:  Within normal limits. Pancreas:  Within normal limits. Adrenals:  Within normal limits. Kidneys/ureters: There is a thin walled cyst in the upper pole left kidney measuring up to 6.3 cm in diameter. Abutting the medial wall is thin calcification measuring 0.7 cm in length, possibly wall calcification versus stone. There is a thin walled cyst in the posterior upper pole of the left kidney measuring 1.4 cm in diameter. There is a thin walled cyst in the anterior lower pole of the left kidney measuring up to 5.9 cm in diameter. There is a thin walled cyst in the posterior lower pole left kidney measuring 1.7 cm in diameter. There is a 0.6 cm, attenuating nodule in the posterior interpolar right kidney, probably a small hemorrhagic or proteinaceous cyst.  Overall the cysts are incompletely assessed on this noncontrast study. There are right renal vascular calcifications. The ureters are not distended. Bowel: There is a small hiatal hernia. The small bowel demonstrates a nonobstructive pattern. There is a bascule orientation of the cecum which demonstrates top normal distention with air and stool. There are multiple small diverticula on the terminal ileum index is not definitively visualized.   There is scattered stool throughout the colon. There are a few non-inflamed diverticuli in the descending and sigmoid colons. There is loss of fat plane between the sigmoid colon and the bladder mass (axial image one and 54 , sagittal image 47) sweta      Aorta/vasculature: Aorta is normal in caliber with severe calcified plaque. There is extensive calcified plaque in the iliac arterial system. Pelvis: There is focal, mass-like thickening of the lateral bladder dome, maximum 1.5 cm in thickness and overall covering the area of approximately 4.4 x 4.0 cm. There is associated contour deformity of the urinary bladder. There is again loss of fat planes along the posterior aspect of this mass-like thickening in the sigmoid colon. there is mild surrounding fat stranding along the lateral aspect of the bladder. No regional lymphadenopathy. Lymph nodes: There are no pathologically enlarged lymph nodes by CT size criteria. Bones:  No suspicious osseous lesion is identified. There is transitional lumbosacral anatomy with sacralization of L5. Of note, the T12 ribs are hypoplastic, and there is the appearance of normal five vertebrae. .  There is severe lower lumbar facet arthrosis and severe disc narrowing at L4-L5. There is mild anterolisthesis of L3 on L4. Mild degenerative changes of the pelvis and hips. Miscellaneous:  No ascites is seen. IMPRESSION:    1. Mass-like thickening of the lateral urinary bladder wall, compatible with known carcinoma. Of note, there is loss of fat plane between the posterior mass in the sigmoid colon and direct invasion is difficult to exclude. 2.  Multiple non-inflamed diverticuli on the terminal ileum. Additionally, the cecum shows a bascule configuration and is in the right upper abdominal quadrant. No colonic obstruction. 3.  Multiple, probable simple left renal cysts, incompletely characterized on this nonenhanced study.   4.  Transitional lumbosacral anatomy with sacralization of L5. Thank you for this referral.            Interpreting Radiologist:  Melissa Pham M.D. Electronically signed: Melissa Pham  08/04/2022      ~~This report was copied and pasted from the servicing EHR system. ~~       Patient Active Problem List   Diagnosis Code    Lung nodule R91.1    HTN (hypertension) I10    Abnormal EKG R94.31    Chronic airway obstruction, not elsewhere classified J44.9    Lung mass R91.8    Shortness of breath R06.02    Lung cancer (HCC) C34.90    Urothelial carcinoma (HCC) C68.9    Incomplete bladder emptying R33.9    Malignant neoplasm of urinary bladder (HCC) C67.9    Arthritis M19.90    Esophageal dysmotility K22.4    Hyperlipidemia E78.5    Hypothyroid E03.9    Osteoporosis M81.0    Malignant neoplasm of skin C44.90    Stage 3 chronic kidney disease (HCC) N18.30       IMPRESSION:   COPD: Gold stage III; PFT w/ FEV1 0.68 L (57%) / FVC 1.54 L (99%). Symptoms adequately controlled on LABA/LAMA + prn DEBORA. Pulmonary embolism, acute: RLL pulmonary embolism as noted on CT chest (5/28/22). On Eliquis 5 mg BID. Given recent malignancy, will likely require life-long AC. Following with Heme/Onc. Dyspnea on exertion: likely multifactorial; suspect primarily due to COPD with severe obstructive defect and decreased DLCO. Most recent lab work without anemia. SpO2 96% on RA today. H/o Adenocarcinoma of the RLL (2014): s/p RT x2 and repeat imaging with stable posttreatment changes. Urothelial/bladder carcinoma: s/p bladder resection with plans to start immunotherapy. Following with oncologist - Dr. Benson Real. H/o tobacco abuse: 21.5 pack year; quit 20 yrs ago  Comorbid conditions: CKD, osteoporosis, hypothyroidism      RECOMMENDATIONS:   Continue Eliquis per Heme/Onc reccs  Continue Anoro Ellipta 1 puff daily   Albuterol rescue inhaler 1 to 2 puffs every 4-6 hours as needed for shortness of breath  Pulmonary rehab - plan to start after completing OT/PT.   Vaccination: recommend all age-appropriate immunizations  Continue follow-up with all of your other providers  Follow up - 6 months & prn.     Education:  Inhaler techniques, MDI/spacers, nebulizers use, goal setting, monitoring  Reducing anxiety, energy conservation tips, exercise, medications and Nutrition    Health maintenance screens deferred to Primary care provider.      Darren Calderon,   9/22/2022  Pulmonary, Critical Care Medicine  [address] [place] [address] [facility]

## 2022-09-22 NOTE — PATIENT INSTRUCTIONS
What is the plan?  -Continue Anoro ellipta as prescribed  -Use Albuterol rescue inhaler as needed for shortness of breath, cough or wheezing    -Increase activity as able   -I agree with Occupational therapy, physical therapy  -Once that is complete, start on Pulmonary rehabilitation    -Please consider being seen by a Psychologist or Therapist.

## 2022-09-22 NOTE — LETTER
10/2/2022    Patient: Agustin Granda   YOB: 1945   Date of Visit: 9/22/2022     Ema Encinas MD  [address]  [address]  Via Fax: [phone]    Dear Ema Encinas MD,      Thank you for referring Ms. Agustin Granda to [address] [place] [address] for evaluation. My notes for this consultation are attached. If you have questions, please do not hesitate to call me. I look forward to following your patient along with you.       Sincerely,    Joe Chowdhury, DO
